# Patient Record
Sex: FEMALE | Race: WHITE | NOT HISPANIC OR LATINO | URBAN - METROPOLITAN AREA
[De-identification: names, ages, dates, MRNs, and addresses within clinical notes are randomized per-mention and may not be internally consistent; named-entity substitution may affect disease eponyms.]

---

## 2018-06-20 ENCOUNTER — ON CAMPUS - OUTPATIENT (OUTPATIENT)
Dept: URBAN - METROPOLITAN AREA HOSPITAL 85 | Facility: HOSPITAL | Age: 73
End: 2018-06-20
Payer: COMMERCIAL

## 2018-06-20 ENCOUNTER — HOSPITAL ENCOUNTER (OUTPATIENT)
Dept: PREOP | Facility: HOSPITAL | Age: 73
Setting detail: HOSPITAL OUTPATIENT SURGERY
Discharge: HOME | End: 2018-06-20
Attending: INTERNAL MEDICINE | Admitting: INTERNAL MEDICINE

## 2018-06-20 DIAGNOSIS — K64.8 OTHER HEMORRHOIDS: ICD-10-CM

## 2018-06-20 DIAGNOSIS — K22.2 ESOPHAGEAL OBSTRUCTION: ICD-10-CM

## 2018-06-20 DIAGNOSIS — R13.10 DYSPHAGIA, UNSPECIFIED: ICD-10-CM

## 2018-06-20 DIAGNOSIS — K21.9 GASTRO-ESOPHAGEAL REFLUX DISEASE WITHOUT ESOPHAGITIS: ICD-10-CM

## 2018-06-20 DIAGNOSIS — K57.30 DIVERTICULOSIS OF LARGE INTESTINE WITHOUT PERFORATION OR ABS: ICD-10-CM

## 2018-06-20 DIAGNOSIS — D50.9 IRON DEFICIENCY ANEMIA, UNSPECIFIED: ICD-10-CM

## 2018-06-20 LAB
GLUCOSE BLD-MCNC: 143 MG/DL (ref 70–105)
GLUCOSE BLD-MCNC: 165 MG/DL (ref 70–105)

## 2018-06-20 PROCEDURE — 45378 DIAGNOSTIC COLONOSCOPY: CPT | Performed by: INTERNAL MEDICINE

## 2018-06-20 PROCEDURE — 43235 EGD DIAGNOSTIC BRUSH WASH: CPT | Performed by: INTERNAL MEDICINE

## 2018-06-20 PROCEDURE — 43450 DILATE ESOPHAGUS 1/MULT PASS: CPT | Performed by: INTERNAL MEDICINE

## 2021-05-27 ENCOUNTER — APPOINTMENT (OUTPATIENT)
Dept: GENERAL RADIOLOGY | Facility: HOSPITAL | Age: 76
End: 2021-05-27

## 2021-05-27 ENCOUNTER — HOSPITAL ENCOUNTER (OUTPATIENT)
Facility: HOSPITAL | Age: 76
Setting detail: OBSERVATION
Discharge: HOME | End: 2021-05-28
Attending: EMERGENCY MEDICINE | Admitting: EMERGENCY MEDICINE

## 2021-05-27 DIAGNOSIS — R00.8 TRIGEMINY: ICD-10-CM
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
## 2021-05-27 DIAGNOSIS — N18.9 CHRONIC RENAL FAILURE, UNSPECIFIED CKD STAGE: ICD-10-CM

## 2021-05-27 DIAGNOSIS — S16.1XXA STRAIN OF NECK MUSCLE, INITIAL ENCOUNTER: ICD-10-CM

## 2021-05-27 DIAGNOSIS — R00.2 PALPITATIONS: ICD-10-CM

## 2021-05-27 LAB
ANION GAP SERPL CALCULATED.3IONS-SCNC: 14 MMOL/L (ref 5–15)
BUN SERPL-MCNC: 48 MG/DL (ref 8–23)
BUN/CREAT SERPL: 21.8 (ref 7–25)
CALCIUM SPEC-SCNC: 9.1 MG/DL (ref 8.6–10.5)
CHLORIDE SERPL-SCNC: 97 MMOL/L (ref 98–107)
CO2 SERPL-SCNC: 25 MMOL/L (ref 22–29)
CREAT SERPL-MCNC: 2.2 MG/DL (ref 0.57–1)
GFR SERPL CREATININE-BSD FRML MDRD: 22 ML/MIN/1.73
GLUCOSE SERPL-MCNC: 327 MG/DL (ref 65–99)
HOLD SPECIMEN: NORMAL
MAGNESIUM SERPL-MCNC: 1.8 MG/DL (ref 1.6–2.4)
POTASSIUM SERPL-SCNC: 4.1 MMOL/L (ref 3.5–5.2)
SODIUM SERPL-SCNC: 136 MMOL/L (ref 136–145)

## 2021-05-27 PROCEDURE — 80048 BASIC METABOLIC PNL TOTAL CA: CPT | Performed by: NURSE PRACTITIONER

## 2021-05-27 PROCEDURE — 93005 ELECTROCARDIOGRAM TRACING: CPT | Performed by: NURSE PRACTITIONER

## 2021-05-27 PROCEDURE — 83735 ASSAY OF MAGNESIUM: CPT | Performed by: NURSE PRACTITIONER

## 2021-05-27 PROCEDURE — 99283 EMERGENCY DEPT VISIT LOW MDM: CPT

## 2021-05-27 PROCEDURE — G0378 HOSPITAL OBSERVATION PER HR: HCPCS

## 2021-05-27 PROCEDURE — 72050 X-RAY EXAM NECK SPINE 4/5VWS: CPT

## 2021-05-27 PROCEDURE — 72072 X-RAY EXAM THORAC SPINE 3VWS: CPT

## 2021-05-27 RX ORDER — INSULIN LISPRO 100 [IU]/ML
10 INJECTION, SOLUTION INTRAVENOUS; SUBCUTANEOUS
COMMUNITY
End: 2021-05-27

## 2021-05-27 RX ORDER — FLUOXETINE HYDROCHLORIDE 20 MG/1
CAPSULE ORAL
COMMUNITY
End: 2021-05-28

## 2021-05-27 RX ORDER — MULTIVIT WITH MINERALS/LUTEIN
500 TABLET ORAL DAILY
COMMUNITY
End: 2022-07-13

## 2021-05-27 RX ORDER — MULTIVIT WITH MINERALS/LUTEIN
1000 TABLET ORAL DAILY
Status: ON HOLD | COMMUNITY
End: 2022-09-01

## 2021-05-27 RX ORDER — INSULIN DETEMIR 100 [IU]/ML
26-28 INJECTION, SOLUTION SUBCUTANEOUS NIGHTLY
COMMUNITY

## 2021-05-27 RX ORDER — GLIMEPIRIDE 4 MG/1
4 TABLET ORAL 2 TIMES DAILY
COMMUNITY
End: 2022-07-13

## 2021-05-27 RX ORDER — FLUOXETINE 10 MG/1
CAPSULE ORAL
COMMUNITY
End: 2021-05-28

## 2021-05-27 RX ORDER — TRAMADOL HYDROCHLORIDE 50 MG/1
TABLET ORAL
COMMUNITY
End: 2021-05-27

## 2021-05-27 RX ORDER — CIPROFLOXACIN 500 MG/1
TABLET, FILM COATED ORAL EVERY 12 HOURS
COMMUNITY
End: 2021-05-27

## 2021-05-27 RX ORDER — CARVEDILOL 6.25 MG/1
6.25 TABLET ORAL 2 TIMES DAILY WITH MEALS
COMMUNITY
End: 2021-06-21 | Stop reason: ALTCHOICE

## 2021-05-27 RX ORDER — OXYCODONE AND ACETAMINOPHEN 7.5; 325 MG/1; MG/1
TABLET ORAL
COMMUNITY
End: 2021-05-27

## 2021-05-27 RX ORDER — FLUOXETINE HYDROCHLORIDE 40 MG/1
CAPSULE ORAL
COMMUNITY
End: 2021-05-28

## 2021-05-27 RX ORDER — SITAGLIPTIN AND METFORMIN HYDROCHLORIDE 1000; 50 MG/1; MG/1
TABLET, FILM COATED ORAL EVERY 12 HOURS SCHEDULED
COMMUNITY
End: 2021-05-27

## 2021-05-27 RX ORDER — ATORVASTATIN CALCIUM 40 MG/1
TABLET, FILM COATED ORAL
COMMUNITY
End: 2021-05-27

## 2021-05-27 RX ORDER — OXYCODONE HYDROCHLORIDE 5 MG/1
5 TABLET ORAL ONCE
Status: COMPLETED | OUTPATIENT
Start: 2021-05-27 | End: 2021-05-27

## 2021-05-27 RX ORDER — SODIUM BICARBONATE 650 MG/1
650 TABLET ORAL 3 TIMES DAILY
COMMUNITY

## 2021-05-27 RX ORDER — DOXYCYCLINE HYCLATE 50 MG/1
CAPSULE, GELATIN COATED ORAL
COMMUNITY
End: 2021-05-27

## 2021-05-27 RX ORDER — INSULIN ASPART 100 [IU]/ML
10 INJECTION, SOLUTION INTRAVENOUS; SUBCUTANEOUS
Status: ON HOLD | COMMUNITY
End: 2022-09-02

## 2021-05-27 RX ORDER — ESOMEPRAZOLE MAGNESIUM 40 MG/1
CAPSULE, DELAYED RELEASE ORAL
COMMUNITY
End: 2021-05-27

## 2021-05-27 RX ORDER — CYCLOBENZAPRINE HCL 10 MG
TABLET ORAL EVERY 12 HOURS SCHEDULED
COMMUNITY
End: 2021-05-27

## 2021-05-27 RX ORDER — TRIAMTERENE AND HYDROCHLOROTHIAZIDE 37.5; 25 MG/1; MG/1
1 TABLET ORAL DAILY
COMMUNITY
End: 2022-07-13

## 2021-05-27 RX ORDER — CHLORAL HYDRATE 500 MG
1000 CAPSULE ORAL 2 TIMES DAILY WITH MEALS
COMMUNITY
Start: 2020-12-02

## 2021-05-27 RX ORDER — AMLODIPINE BESYLATE 10 MG/1
10 TABLET ORAL DAILY
COMMUNITY
End: 2022-09-09

## 2021-05-27 RX ORDER — LANCETS
1 EACH MISCELLANEOUS 3 TIMES DAILY
Status: ON HOLD | COMMUNITY
End: 2022-09-02

## 2021-05-27 RX ORDER — ASPIRIN 81 MG/1
81 TABLET ORAL EVERY OTHER DAY
COMMUNITY
End: 2022-07-14

## 2021-05-27 RX ORDER — CLOBETASOL PROPIONATE 0.5 MG/G
CREAM TOPICAL
COMMUNITY
End: 2021-05-27

## 2021-05-27 RX ORDER — CHLORHEXIDINE GLUCONATE 0.12 MG/ML
RINSE ORAL
COMMUNITY
End: 2021-05-27

## 2021-05-27 RX ORDER — PANTOPRAZOLE SODIUM 40 MG/1
40 TABLET, DELAYED RELEASE ORAL 2 TIMES DAILY
COMMUNITY

## 2021-05-27 RX ORDER — ATORVASTATIN CALCIUM 40 MG/1
40 TABLET, FILM COATED ORAL NIGHTLY
COMMUNITY
End: 2022-07-14

## 2021-05-27 RX ORDER — FLUOXETINE 20 MG/1
TABLET ORAL
COMMUNITY
End: 2021-05-28

## 2021-05-27 RX ORDER — HYDROCHLOROTHIAZIDE 12.5 MG/1
TABLET ORAL
COMMUNITY
End: 2021-05-27

## 2021-05-27 RX ADMIN — OXYCODONE 5 MG: 5 TABLET ORAL at 21:05

## 2021-05-28 ENCOUNTER — APPOINTMENT (OUTPATIENT)
Dept: CARDIOLOGY | Facility: HOSPITAL | Age: 76
End: 2021-05-28

## 2021-05-28 ENCOUNTER — APPOINTMENT (OUTPATIENT)
Dept: RESPIRATORY THERAPY | Facility: HOSPITAL | Age: 76
End: 2021-05-28

## 2021-05-28 VITALS
DIASTOLIC BLOOD PRESSURE: 75 MMHG | HEIGHT: 65 IN | SYSTOLIC BLOOD PRESSURE: 141 MMHG | WEIGHT: 202 LBS | OXYGEN SATURATION: 99 % | HEART RATE: 70 BPM | TEMPERATURE: 97.7 F | RESPIRATION RATE: 18 BRPM | BODY MASS INDEX: 33.66 KG/M2

## 2021-05-28 LAB
ANION GAP SERPL CALCULATED.3IONS-SCNC: 12 MMOL/L (ref 5–15)
BASOPHILS # BLD AUTO: 0 10*3/MM3 (ref 0–0.2)
BASOPHILS NFR BLD AUTO: 0.6 % (ref 0–1.5)
BH CV ECHO MEAS - ACS: 1.9 CM
BH CV ECHO MEAS - AO MAX PG (FULL): 3.9 MMHG
BH CV ECHO MEAS - AO MAX PG: 7.7 MMHG
BH CV ECHO MEAS - AO MEAN PG (FULL): 3.2 MMHG
BH CV ECHO MEAS - AO MEAN PG: 5.2 MMHG
BH CV ECHO MEAS - AO ROOT AREA (BSA CORRECTED): 1.3
BH CV ECHO MEAS - AO ROOT AREA: 4.9 CM^2
BH CV ECHO MEAS - AO ROOT DIAM: 2.5 CM
BH CV ECHO MEAS - AO V2 MAX: 139 CM/SEC
BH CV ECHO MEAS - AO V2 MEAN: 110.4 CM/SEC
BH CV ECHO MEAS - AO V2 VTI: 36.1 CM
BH CV ECHO MEAS - AORTIC HR: 64.6 BPM
BH CV ECHO MEAS - AORTIC R-R: 0.93 SEC
BH CV ECHO MEAS - ASC AORTA: 2.6 CM
BH CV ECHO MEAS - AVA(I,A): 2.1 CM^2
BH CV ECHO MEAS - AVA(I,D): 2.1 CM^2
BH CV ECHO MEAS - AVA(V,A): 2.3 CM^2
BH CV ECHO MEAS - AVA(V,D): 2.3 CM^2
BH CV ECHO MEAS - BSA(HAYCOCK): 2.1 M^2
BH CV ECHO MEAS - BSA: 2 M^2
BH CV ECHO MEAS - BZI_BMI: 33.6 KILOGRAMS/M^2
BH CV ECHO MEAS - BZI_METRIC_HEIGHT: 165.1 CM
BH CV ECHO MEAS - BZI_METRIC_WEIGHT: 91.6 KG
BH CV ECHO MEAS - CI(AO): 5.7 L/MIN/M^2
BH CV ECHO MEAS - CI(LVOT): 2.5 L/MIN/M^2
BH CV ECHO MEAS - CO(AO): 11.3 L/MIN
BH CV ECHO MEAS - CO(LVOT): 5 L/MIN
BH CV ECHO MEAS - EDV(CUBED): 68.1 ML
BH CV ECHO MEAS - EDV(MOD-SP2): 53.2 ML
BH CV ECHO MEAS - EDV(MOD-SP4): 81.5 ML
BH CV ECHO MEAS - EDV(TEICH): 73.5 ML
BH CV ECHO MEAS - EF(CUBED): 56.4 %
BH CV ECHO MEAS - EF(MOD-BP): 63 %
BH CV ECHO MEAS - EF(MOD-SP2): 66 %
BH CV ECHO MEAS - EF(MOD-SP4): 63.4 %
BH CV ECHO MEAS - EF(TEICH): 48.6 %
BH CV ECHO MEAS - ESV(CUBED): 29.7 ML
BH CV ECHO MEAS - ESV(MOD-SP2): 18.1 ML
BH CV ECHO MEAS - ESV(MOD-SP4): 29.9 ML
BH CV ECHO MEAS - ESV(TEICH): 37.8 ML
BH CV ECHO MEAS - FS: 24.2 %
BH CV ECHO MEAS - IVS/LVPW: 0.7
BH CV ECHO MEAS - IVSD: 1.2 CM
BH CV ECHO MEAS - LA DIMENSION(2D): 3.3 CM
BH CV ECHO MEAS - LV DIASTOLIC VOL/BSA (35-75): 41 ML/M^2
BH CV ECHO MEAS - LV MASS(C)D: 232.4 GRAMS
BH CV ECHO MEAS - LV MASS(C)DI: 117 GRAMS/M^2
BH CV ECHO MEAS - LV MAX PG: 3.8 MMHG
BH CV ECHO MEAS - LV MEAN PG: 2.1 MMHG
BH CV ECHO MEAS - LV SYSTOLIC VOL/BSA (12-30): 15 ML/M^2
BH CV ECHO MEAS - LV V1 MAX: 97.7 CM/SEC
BH CV ECHO MEAS - LV V1 MEAN: 67.4 CM/SEC
BH CV ECHO MEAS - LV V1 VTI: 23.9 CM
BH CV ECHO MEAS - LVIDD: 4.1 CM
BH CV ECHO MEAS - LVIDS: 3.1 CM
BH CV ECHO MEAS - LVOT AREA: 3.2 CM^2
BH CV ECHO MEAS - LVOT DIAM: 2 CM
BH CV ECHO MEAS - LVPWD: 1.7 CM
BH CV ECHO MEAS - MV A MAX VEL: 94.4 CM/SEC
BH CV ECHO MEAS - MV DEC SLOPE: 177.7 CM/SEC^2
BH CV ECHO MEAS - MV DEC TIME: 0.29 SEC
BH CV ECHO MEAS - MV E MAX VEL: 52.2 CM/SEC
BH CV ECHO MEAS - MV E/A: 0.55
BH CV ECHO MEAS - MV MAX PG: 3.7 MMHG
BH CV ECHO MEAS - MV MEAN PG: 1.4 MMHG
BH CV ECHO MEAS - MV V2 MAX: 96.4 CM/SEC
BH CV ECHO MEAS - MV V2 MEAN: 53.6 CM/SEC
BH CV ECHO MEAS - MV V2 VTI: 25.9 CM
BH CV ECHO MEAS - MVA(VTI): 3 CM^2
BH CV ECHO MEAS - PA ACC TIME: 0.1 SEC
BH CV ECHO MEAS - PA MAX PG (FULL): 1.7 MMHG
BH CV ECHO MEAS - PA MAX PG: 4.9 MMHG
BH CV ECHO MEAS - PA MEAN PG (FULL): 1.2 MMHG
BH CV ECHO MEAS - PA MEAN PG: 3.2 MMHG
BH CV ECHO MEAS - PA PR(ACCEL): 32.8 MMHG
BH CV ECHO MEAS - PA V2 MAX: 110.9 CM/SEC
BH CV ECHO MEAS - PA V2 MEAN: 84.5 CM/SEC
BH CV ECHO MEAS - PA V2 VTI: 25.1 CM
BH CV ECHO MEAS - PULM A REVS DUR: 0.25 SEC
BH CV ECHO MEAS - PULM A REVS VEL: 37.5 CM/SEC
BH CV ECHO MEAS - PULM DIAS VEL: 44.2 CM/SEC
BH CV ECHO MEAS - PULM S/D: 1.3
BH CV ECHO MEAS - PULM SYS VEL: 56 CM/SEC
BH CV ECHO MEAS - PVA(I,A): 3.8 CM^2
BH CV ECHO MEAS - PVA(I,D): 3.8 CM^2
BH CV ECHO MEAS - PVA(V,A): 3.8 CM^2
BH CV ECHO MEAS - PVA(V,D): 3.8 CM^2
BH CV ECHO MEAS - QP/QS: 1.2
BH CV ECHO MEAS - RAP SYSTOLE: 3 MMHG
BH CV ECHO MEAS - RV MAX PG: 3.3 MMHG
BH CV ECHO MEAS - RV MEAN PG: 2 MMHG
BH CV ECHO MEAS - RV V1 MAX: 90.3 CM/SEC
BH CV ECHO MEAS - RV V1 MEAN: 66.6 CM/SEC
BH CV ECHO MEAS - RV V1 VTI: 20.3 CM
BH CV ECHO MEAS - RVDD: 2.4 CM
BH CV ECHO MEAS - RVOT AREA: 4.6 CM^2
BH CV ECHO MEAS - RVOT DIAM: 2.4 CM
BH CV ECHO MEAS - RVSP: 14.8 MMHG
BH CV ECHO MEAS - SI(AO): 88.3 ML/M^2
BH CV ECHO MEAS - SI(CUBED): 19.4 ML/M^2
BH CV ECHO MEAS - SI(LVOT): 39 ML/M^2
BH CV ECHO MEAS - SI(MOD-SP2): 17.7 ML/M^2
BH CV ECHO MEAS - SI(MOD-SP4): 26 ML/M^2
BH CV ECHO MEAS - SI(TEICH): 18 ML/M^2
BH CV ECHO MEAS - SV(AO): 175.3 ML
BH CV ECHO MEAS - SV(CUBED): 38.4 ML
BH CV ECHO MEAS - SV(LVOT): 77.4 ML
BH CV ECHO MEAS - SV(MOD-SP2): 35.1 ML
BH CV ECHO MEAS - SV(MOD-SP4): 51.7 ML
BH CV ECHO MEAS - SV(RVOT): 94.1 ML
BH CV ECHO MEAS - SV(TEICH): 35.7 ML
BH CV ECHO MEAS - TR MAX VEL: 171.9 CM/SEC
BUN SERPL-MCNC: 44 MG/DL (ref 8–23)
BUN/CREAT SERPL: 24.3 (ref 7–25)
CALCIUM SPEC-SCNC: 9.2 MG/DL (ref 8.6–10.5)
CHLORIDE SERPL-SCNC: 101 MMOL/L (ref 98–107)
CO2 SERPL-SCNC: 25 MMOL/L (ref 22–29)
CREAT SERPL-MCNC: 1.81 MG/DL (ref 0.57–1)
DEPRECATED RDW RBC AUTO: 40.7 FL (ref 37–54)
EOSINOPHIL # BLD AUTO: 0.2 10*3/MM3 (ref 0–0.4)
EOSINOPHIL NFR BLD AUTO: 2.4 % (ref 0.3–6.2)
ERYTHROCYTE [DISTWIDTH] IN BLOOD BY AUTOMATED COUNT: 12.8 % (ref 12.3–15.4)
GFR SERPL CREATININE-BSD FRML MDRD: 27 ML/MIN/1.73
GLUCOSE BLDC GLUCOMTR-MCNC: 122 MG/DL (ref 70–105)
GLUCOSE BLDC GLUCOMTR-MCNC: 129 MG/DL (ref 70–105)
GLUCOSE BLDC GLUCOMTR-MCNC: 174 MG/DL (ref 70–105)
GLUCOSE BLDC GLUCOMTR-MCNC: 194 MG/DL (ref 70–105)
GLUCOSE BLDC GLUCOMTR-MCNC: 201 MG/DL (ref 70–105)
GLUCOSE BLDC GLUCOMTR-MCNC: 83 MG/DL (ref 70–105)
GLUCOSE SERPL-MCNC: 186 MG/DL (ref 65–99)
HCT VFR BLD AUTO: 35.4 % (ref 34–46.6)
HGB BLD-MCNC: 12 G/DL (ref 12–15.9)
LYMPHOCYTES # BLD AUTO: 1.9 10*3/MM3 (ref 0.7–3.1)
LYMPHOCYTES NFR BLD AUTO: 27.3 % (ref 19.6–45.3)
MCH RBC QN AUTO: 30.8 PG (ref 26.6–33)
MCHC RBC AUTO-ENTMCNC: 33.8 G/DL (ref 31.5–35.7)
MCV RBC AUTO: 91 FL (ref 79–97)
MONOCYTES # BLD AUTO: 0.8 10*3/MM3 (ref 0.1–0.9)
MONOCYTES NFR BLD AUTO: 10.8 % (ref 5–12)
NEUTROPHILS NFR BLD AUTO: 4.2 10*3/MM3 (ref 1.7–7)
NEUTROPHILS NFR BLD AUTO: 58.9 % (ref 42.7–76)
NRBC BLD AUTO-RTO: 0.2 /100 WBC (ref 0–0.2)
PLATELET # BLD AUTO: 248 10*3/MM3 (ref 140–450)
PMV BLD AUTO: 8.6 FL (ref 6–12)
POTASSIUM SERPL-SCNC: 4 MMOL/L (ref 3.5–5.2)
RBC # BLD AUTO: 3.89 10*6/MM3 (ref 3.77–5.28)
SARS-COV-2 RNA PNL SPEC NAA+PROBE: NOT DETECTED
SODIUM SERPL-SCNC: 138 MMOL/L (ref 136–145)
WBC # BLD AUTO: 7.1 10*3/MM3 (ref 3.4–10.8)

## 2021-05-28 PROCEDURE — 85025 COMPLETE CBC W/AUTO DIFF WBC: CPT | Performed by: NURSE PRACTITIONER

## 2021-05-28 PROCEDURE — U0003 INFECTIOUS AGENT DETECTION BY NUCLEIC ACID (DNA OR RNA); SEVERE ACUTE RESPIRATORY SYNDROME CORONAVIRUS 2 (SARS-COV-2) (CORONAVIRUS DISEASE [COVID-19]), AMPLIFIED PROBE TECHNIQUE, MAKING USE OF HIGH THROUGHPUT TECHNOLOGIES AS DESCRIBED BY CMS-2020-01-R: HCPCS | Performed by: EMERGENCY MEDICINE

## 2021-05-28 PROCEDURE — G0378 HOSPITAL OBSERVATION PER HR: HCPCS

## 2021-05-28 PROCEDURE — 63710000001 INSULIN LISPRO (HUMAN) PER 5 UNITS: Performed by: NURSE PRACTITIONER

## 2021-05-28 PROCEDURE — 93306 TTE W/DOPPLER COMPLETE: CPT | Performed by: INTERNAL MEDICINE

## 2021-05-28 PROCEDURE — 93306 TTE W/DOPPLER COMPLETE: CPT

## 2021-05-28 PROCEDURE — 93242 EXT ECG>48HR<7D RECORDING: CPT | Performed by: INTERNAL MEDICINE

## 2021-05-28 PROCEDURE — C9803 HOPD COVID-19 SPEC COLLECT: HCPCS | Performed by: EMERGENCY MEDICINE

## 2021-05-28 PROCEDURE — 63710000001 INSULIN LISPRO (HUMAN) PER 5 UNITS: Performed by: PHYSICIAN ASSISTANT

## 2021-05-28 PROCEDURE — 63710000001 INSULIN GLARGINE PER 5 UNITS: Performed by: NURSE PRACTITIONER

## 2021-05-28 PROCEDURE — 99244 OFF/OP CNSLTJ NEW/EST MOD 40: CPT | Performed by: INTERNAL MEDICINE

## 2021-05-28 PROCEDURE — 82962 GLUCOSE BLOOD TEST: CPT

## 2021-05-28 PROCEDURE — 93246 EXT ECG>7D<15D RECORDING: CPT

## 2021-05-28 PROCEDURE — 80048 BASIC METABOLIC PNL TOTAL CA: CPT | Performed by: NURSE PRACTITIONER

## 2021-05-28 RX ORDER — ATORVASTATIN CALCIUM 20 MG/1
20 TABLET, FILM COATED ORAL NIGHTLY
Status: DISCONTINUED | OUTPATIENT
Start: 2021-05-28 | End: 2021-05-28 | Stop reason: HOSPADM

## 2021-05-28 RX ORDER — SODIUM CHLORIDE 0.9 % (FLUSH) 0.9 %
10 SYRINGE (ML) INJECTION EVERY 12 HOURS SCHEDULED
Status: DISCONTINUED | OUTPATIENT
Start: 2021-05-28 | End: 2021-05-28

## 2021-05-28 RX ORDER — DEXTROSE MONOHYDRATE 25 G/50ML
25 INJECTION, SOLUTION INTRAVENOUS
Status: DISCONTINUED | OUTPATIENT
Start: 2021-05-28 | End: 2021-05-28 | Stop reason: HOSPADM

## 2021-05-28 RX ORDER — MELATONIN
2000 DAILY
Status: DISCONTINUED | OUTPATIENT
Start: 2021-05-28 | End: 2021-05-28 | Stop reason: HOSPADM

## 2021-05-28 RX ORDER — PANTOPRAZOLE SODIUM 40 MG/1
40 TABLET, DELAYED RELEASE ORAL
Status: DISCONTINUED | OUTPATIENT
Start: 2021-05-28 | End: 2021-05-28 | Stop reason: HOSPADM

## 2021-05-28 RX ORDER — SODIUM CHLORIDE 0.9 % (FLUSH) 0.9 %
10 SYRINGE (ML) INJECTION AS NEEDED
Status: DISCONTINUED | OUTPATIENT
Start: 2021-05-28 | End: 2021-05-28

## 2021-05-28 RX ORDER — INSULIN LISPRO 100 [IU]/ML
0-14 INJECTION, SOLUTION INTRAVENOUS; SUBCUTANEOUS
Status: DISCONTINUED | OUTPATIENT
Start: 2021-05-28 | End: 2021-05-28 | Stop reason: HOSPADM

## 2021-05-28 RX ORDER — NICOTINE POLACRILEX 4 MG
15 LOZENGE BUCCAL
Status: DISCONTINUED | OUTPATIENT
Start: 2021-05-28 | End: 2021-05-28 | Stop reason: HOSPADM

## 2021-05-28 RX ORDER — ONDANSETRON 2 MG/ML
4 INJECTION INTRAMUSCULAR; INTRAVENOUS EVERY 6 HOURS PRN
Status: DISCONTINUED | OUTPATIENT
Start: 2021-05-28 | End: 2021-05-28 | Stop reason: HOSPADM

## 2021-05-28 RX ORDER — INSULIN LISPRO 100 [IU]/ML
0-14 INJECTION, SOLUTION INTRAVENOUS; SUBCUTANEOUS AS NEEDED
Status: DISCONTINUED | OUTPATIENT
Start: 2021-05-28 | End: 2021-05-28 | Stop reason: HOSPADM

## 2021-05-28 RX ORDER — GLIPIZIDE 5 MG/1
5 TABLET ORAL
Status: DISCONTINUED | OUTPATIENT
Start: 2021-05-28 | End: 2021-05-28 | Stop reason: HOSPADM

## 2021-05-28 RX ORDER — ACETAMINOPHEN 325 MG/1
650 TABLET ORAL EVERY 4 HOURS PRN
Status: DISCONTINUED | OUTPATIENT
Start: 2021-05-28 | End: 2021-05-28 | Stop reason: HOSPADM

## 2021-05-28 RX ORDER — GLIPIZIDE 5 MG/1
5 TABLET ORAL
Status: DISCONTINUED | OUTPATIENT
Start: 2021-05-28 | End: 2021-05-28

## 2021-05-28 RX ORDER — DEXTROSE MONOHYDRATE 25 G/50ML
25 INJECTION, SOLUTION INTRAVENOUS
Status: DISCONTINUED | OUTPATIENT
Start: 2021-05-28 | End: 2021-05-28 | Stop reason: SDUPTHER

## 2021-05-28 RX ORDER — MELATONIN
2000 2 TIMES DAILY
COMMUNITY

## 2021-05-28 RX ORDER — INSULIN LISPRO 100 [IU]/ML
10 INJECTION, SOLUTION INTRAVENOUS; SUBCUTANEOUS
Status: DISCONTINUED | OUTPATIENT
Start: 2021-05-28 | End: 2021-05-28 | Stop reason: HOSPADM

## 2021-05-28 RX ORDER — SODIUM BICARBONATE 650 MG/1
650 TABLET ORAL 3 TIMES DAILY
Status: DISCONTINUED | OUTPATIENT
Start: 2021-05-28 | End: 2021-05-28 | Stop reason: HOSPADM

## 2021-05-28 RX ORDER — NICOTINE POLACRILEX 4 MG
15 LOZENGE BUCCAL
Status: DISCONTINUED | OUTPATIENT
Start: 2021-05-28 | End: 2021-05-28 | Stop reason: SDUPTHER

## 2021-05-28 RX ORDER — TRIAMTERENE AND HYDROCHLOROTHIAZIDE 37.5; 25 MG/1; MG/1
1 TABLET ORAL DAILY
Status: DISCONTINUED | OUTPATIENT
Start: 2021-05-28 | End: 2021-05-28 | Stop reason: HOSPADM

## 2021-05-28 RX ORDER — CHOLECALCIFEROL (VITAMIN D3) 125 MCG
5 CAPSULE ORAL NIGHTLY PRN
Status: DISCONTINUED | OUTPATIENT
Start: 2021-05-28 | End: 2021-05-28 | Stop reason: HOSPADM

## 2021-05-28 RX ORDER — AMLODIPINE BESYLATE 5 MG/1
10 TABLET ORAL DAILY
Status: DISCONTINUED | OUTPATIENT
Start: 2021-05-28 | End: 2021-05-28 | Stop reason: HOSPADM

## 2021-05-28 RX ORDER — CARVEDILOL 6.25 MG/1
6.25 TABLET ORAL 2 TIMES DAILY WITH MEALS
Status: DISCONTINUED | OUTPATIENT
Start: 2021-05-28 | End: 2021-05-28 | Stop reason: HOSPADM

## 2021-05-28 RX ORDER — ASPIRIN 81 MG/1
81 TABLET ORAL EVERY OTHER DAY
Status: DISCONTINUED | OUTPATIENT
Start: 2021-05-28 | End: 2021-05-28 | Stop reason: HOSPADM

## 2021-05-28 RX ORDER — INSULIN GLARGINE 100 [IU]/ML
18 INJECTION, SOLUTION SUBCUTANEOUS NIGHTLY
Status: DISCONTINUED | OUTPATIENT
Start: 2021-05-28 | End: 2021-05-28 | Stop reason: HOSPADM

## 2021-05-28 RX ORDER — FERROUS SULFATE TAB EC 324 MG (65 MG FE EQUIVALENT) 324 (65 FE) MG
324 TABLET DELAYED RESPONSE ORAL
Status: DISCONTINUED | OUTPATIENT
Start: 2021-05-28 | End: 2021-05-28 | Stop reason: HOSPADM

## 2021-05-28 RX ORDER — FERROUS SULFATE TAB EC 324 MG (65 MG FE EQUIVALENT) 324 (65 FE) MG
324 TABLET DELAYED RESPONSE ORAL
COMMUNITY

## 2021-05-28 RX ADMIN — ATORVASTATIN CALCIUM 20 MG: 20 TABLET, FILM COATED ORAL at 01:00

## 2021-05-28 RX ADMIN — PANTOPRAZOLE SODIUM 40 MG: 40 TABLET, DELAYED RELEASE ORAL at 06:01

## 2021-05-28 RX ADMIN — FERROUS SULFATE TAB EC 324 MG (65 MG FE EQUIVALENT) 324 MG: 324 (65 FE) TABLET DELAYED RESPONSE at 08:09

## 2021-05-28 RX ADMIN — TRIAMTERENE AND HYDROCHLOROTHIAZIDE 1 TABLET: 37.5; 25 TABLET ORAL at 10:47

## 2021-05-28 RX ADMIN — CARVEDILOL 6.25 MG: 6.25 TABLET, FILM COATED ORAL at 17:15

## 2021-05-28 RX ADMIN — ASPIRIN 81 MG: 81 TABLET, COATED ORAL at 08:09

## 2021-05-28 RX ADMIN — INSULIN LISPRO 10 UNITS: 100 INJECTION, SOLUTION INTRAVENOUS; SUBCUTANEOUS at 08:09

## 2021-05-28 RX ADMIN — INSULIN LISPRO 10 UNITS: 100 INJECTION, SOLUTION INTRAVENOUS; SUBCUTANEOUS at 17:16

## 2021-05-28 RX ADMIN — SODIUM BICARBONATE 650 MG TABLET 650 MG: at 17:15

## 2021-05-28 RX ADMIN — INSULIN LISPRO 3 UNITS: 100 INJECTION, SOLUTION INTRAVENOUS; SUBCUTANEOUS at 08:10

## 2021-05-28 RX ADMIN — AMLODIPINE BESYLATE 10 MG: 5 TABLET ORAL at 08:09

## 2021-05-28 RX ADMIN — CARVEDILOL 6.25 MG: 6.25 TABLET, FILM COATED ORAL at 08:37

## 2021-05-28 RX ADMIN — Medication 2000 UNITS: at 08:09

## 2021-05-28 RX ADMIN — LINAGLIPTIN 5 MG: 5 TABLET, FILM COATED ORAL at 08:09

## 2021-05-28 RX ADMIN — INSULIN GLARGINE 18 UNITS: 100 INJECTION, SOLUTION SUBCUTANEOUS at 01:00

## 2021-05-28 RX ADMIN — SODIUM BICARBONATE 650 MG TABLET 650 MG: at 08:09

## 2021-05-28 RX ADMIN — GLIPIZIDE 5 MG: 5 TABLET ORAL at 01:00

## 2021-05-30 LAB — QT INTERVAL: 410 MS

## 2021-06-01 NOTE — CASE MANAGEMENT/SOCIAL WORK
Case Management Discharge Note                Selected Continued Care - Discharged on 5/28/2021 Admission date: 5/27/2021 - Discharge disposition: Home or Self Care     Final Discharge Disposition Code: 01 - home or self-care

## 2021-06-16 ENCOUNTER — OFFICE VISIT (OUTPATIENT)
Dept: CARDIOLOGY | Facility: CLINIC | Age: 76
End: 2021-06-16

## 2021-06-16 VITALS
OXYGEN SATURATION: 99 % | HEIGHT: 65 IN | HEART RATE: 62 BPM | DIASTOLIC BLOOD PRESSURE: 73 MMHG | BODY MASS INDEX: 32.99 KG/M2 | WEIGHT: 198 LBS | SYSTOLIC BLOOD PRESSURE: 166 MMHG

## 2021-06-16 DIAGNOSIS — I10 ESSENTIAL HYPERTENSION: ICD-10-CM

## 2021-06-16 DIAGNOSIS — E78.00 PURE HYPERCHOLESTEROLEMIA: ICD-10-CM

## 2021-06-16 DIAGNOSIS — E11.9 TYPE 2 DIABETES MELLITUS WITHOUT COMPLICATION, WITHOUT LONG-TERM CURRENT USE OF INSULIN (HCC): ICD-10-CM

## 2021-06-16 DIAGNOSIS — R00.2 PALPITATIONS: Primary | ICD-10-CM

## 2021-06-16 PROCEDURE — 99213 OFFICE O/P EST LOW 20 MIN: CPT | Performed by: INTERNAL MEDICINE
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
## 2021-06-18 PROCEDURE — 93244 EXT ECG>48HR<7D REV&INTERPJ: CPT | Performed by: INTERNAL MEDICINE

## 2021-06-20 NOTE — TELEPHONE ENCOUNTER
Called pt. crease the dose of carvedilol to 12.5 mg twice daily.  If she tolerates, please call the prescription to her pharmacy.

## 2021-06-21 RX ORDER — CARVEDILOL 12.5 MG/1
12.5 TABLET ORAL 2 TIMES DAILY WITH MEALS
COMMUNITY
End: 2021-06-21 | Stop reason: SDUPTHER

## 2021-06-21 RX ORDER — CARVEDILOL 12.5 MG/1
12.5 TABLET ORAL 2 TIMES DAILY WITH MEALS
Qty: 180 TABLET | Refills: 2 | Status: SHIPPED | OUTPATIENT
Start: 2021-06-21 | End: 2021-12-22

## 2021-07-14 ENCOUNTER — OFFICE VISIT (OUTPATIENT)
Dept: CARDIOLOGY | Facility: CLINIC | Age: 76
End: 2021-07-14

## 2021-07-14 VITALS
HEIGHT: 65 IN | OXYGEN SATURATION: 98 % | DIASTOLIC BLOOD PRESSURE: 77 MMHG | BODY MASS INDEX: 33.15 KG/M2 | WEIGHT: 199 LBS | HEART RATE: 61 BPM | SYSTOLIC BLOOD PRESSURE: 153 MMHG

## 2021-07-14 DIAGNOSIS — I10 ESSENTIAL HYPERTENSION: ICD-10-CM

## 2021-07-14 DIAGNOSIS — R00.2 PALPITATIONS: Primary | ICD-10-CM

## 2021-07-14 DIAGNOSIS — E11.9 TYPE 2 DIABETES MELLITUS WITHOUT COMPLICATION, WITHOUT LONG-TERM CURRENT USE OF INSULIN (HCC): ICD-10-CM

## 2021-07-14 DIAGNOSIS — E78.00 PURE HYPERCHOLESTEROLEMIA: ICD-10-CM

## 2021-07-14 PROCEDURE — 99214 OFFICE O/P EST MOD 30 MIN: CPT | Performed by: INTERNAL MEDICINE

## 2021-07-14 NOTE — PROGRESS NOTES
"    Subjective:     Encounter Date:2021      Patient ID: Neeta Harrell is a 75 y.o. female.    Chief Complaint:  History of Present Illness 75-year-old white female with history of diabetes hypertension hyperlipidemia chronic insulin supposed to my office for follow-up.  Patient is currently stable without any signs of chest pain or shortness of breath at rest on exertion.  No complains of any PND orthopnea.  No palpitation dizziness syncope or swelling of the feet.  She is taking her medicines regularly.    The following portions of the patient's history were reviewed and updated as appropriate: allergies, current medications, past family history, past medical history, past social history, past surgical history and problem list.  Past Medical History:   Diagnosis Date   • Arthritis    • CKD (chronic kidney disease)    • Diabetes mellitus (CMS/HCC)    • Hyperlipidemia    • Hypertension      Past Surgical History:   Procedure Laterality Date   •  SECTION      x2   • CHOLECYSTECTOMY     • HYSTERECTOMY     • INCONTINENCE SURGERY     • TONSILLECTOMY       /77 (BP Location: Left arm, Patient Position: Sitting)   Pulse 61   Ht 165.1 cm (65\")   Wt 90.3 kg (199 lb)   SpO2 98%   BMI 33.12 kg/m²   Family History   Problem Relation Age of Onset   • Arrhythmia Sister         has pacemaker   • Heart disease Brother         bypass sx       Current Outpatient Medications:   •  Accu-Chek Softclix Lancets lancets, 1 each 3 (Three) Times a Day. Test BG TID, Disp: , Rfl:   •  amLODIPine (NORVASC) 10 MG tablet, Take 10 mg by mouth Daily., Disp: , Rfl:   •  aspirin (aspirin) 81 MG EC tablet, 81 mg Every Other Day., Disp: , Rfl:   •  atorvastatin (Lipitor) 40 MG tablet, Take 40 mg by mouth Every Night., Disp: , Rfl:   •  carvedilol (COREG) 12.5 MG tablet, Take 1 tablet by mouth 2 (Two) Times a Day With Meals., Disp: 180 tablet, Rfl: 2  •  cholecalciferol (VITAMIN D3) 25 MCG (1000 UT) tablet, Take 2,000 " Units by mouth Daily., Disp: , Rfl:   •  ferrous sulfate 324 (65 Fe) MG tablet delayed-release EC tablet, Take 324 mg by mouth Daily With Breakfast., Disp: , Rfl:   •  glimepiride (AMARYL) 4 MG tablet, Take 4 mg by mouth 2 (two) times a day., Disp: , Rfl:   •  glucagon (GLUCAGEN) 1 MG injection, Inject 1 mg into the appropriate muscle as directed by prescriber 1 (One) Time As Needed., Disp: , Rfl:   •  glucose blood test strip, 1 each by Other route 3 (Three) Times a Day As Needed., Disp: , Rfl:   •  insulin aspart (NovoLOG FlexPen) 100 UNIT/ML solution pen-injector sc pen, Inject 10 Units under the skin into the appropriate area as directed 3 (Three) Times a Day With Meals., Disp: , Rfl:   •  insulin detemir (Levemir FlexTouch) 100 UNIT/ML injection, Inject 18 Units under the skin into the appropriate area as directed Every Night., Disp: , Rfl:   •  Omega-3 Fatty Acids (fish oil) 1000 MG capsule capsule, Take 1,000 mg by mouth 2 (Two) Times a Day With Meals., Disp: , Rfl:   •  pantoprazole (PROTONIX) 40 MG EC tablet, Take 40 mg by mouth 2 (two) times a day., Disp: , Rfl:   •  SITagliptin (Januvia) 25 MG tablet, Take 25 mg by mouth Daily., Disp: , Rfl:   •  sodium bicarbonate 650 MG tablet, Take 650 mg by mouth 3 (Three) Times a Day., Disp: , Rfl:   •  triamterene-hydrochlorothiazide (MAXZIDE-25) 37.5-25 MG per tablet, Take 1 tablet by mouth Daily., Disp: , Rfl:   •  vitamin C (ASCORBIC ACID) 250 MG tablet, Take 500 mg by mouth Daily., Disp: , Rfl:   •  vitamin E 1000 UNIT capsule, Take 1,000 Units by mouth Daily., Disp: , Rfl:   •  Zinc 50 MG capsule, Take  by mouth., Disp: , Rfl:   Allergies   Allergen Reactions   • Ibuprofen Other (See Comments) and Nausea And Vomiting     Kidney and liver problems     Social History     Socioeconomic History   • Marital status:      Spouse name: Not on file   • Number of children: Not on file   • Years of education: Not on file   • Highest education level: Not on file    Tobacco Use   • Smoking status: Never Smoker   • Smokeless tobacco: Never Used   Vaping Use   • Vaping Use: Never used   Substance and Sexual Activity   • Alcohol use: Never   • Drug use: Never   • Sexual activity: Defer     Review of Systems   Constitutional: Negative for fever and malaise/fatigue.   Cardiovascular: Negative for chest pain, dyspnea on exertion, leg swelling and palpitations.   Respiratory: Negative for cough and shortness of breath.    Skin: Negative for rash.   Gastrointestinal: Negative for abdominal pain, nausea and vomiting.   Neurological: Negative for focal weakness, headaches, light-headedness and numbness.   All other systems reviewed and are negative.             Objective:     Constitutional:       Appearance: Well-developed.   Eyes:      General: No scleral icterus.     Conjunctiva/sclera: Conjunctivae normal.   HENT:      Head: Normocephalic and atraumatic.   Neck:      Vascular: No carotid bruit or JVD.   Pulmonary:      Effort: Pulmonary effort is normal.      Breath sounds: Normal breath sounds. No wheezing. No rales.   Cardiovascular:      Normal rate. Regular rhythm.   Pulses:     Intact distal pulses.   Abdominal:      General: Bowel sounds are normal.      Palpations: Abdomen is soft.   Musculoskeletal:      Cervical back: Normal range of motion and neck supple. Skin:     General: Skin is warm and dry.      Findings: No rash.   Neurological:      Mental Status: Alert.       Procedures    Lab Review:         MDM  1.  Palpitations  Patient has frequent PACs and occasional atrial arrhythmias but is currently stable on beta-blockers  Patient has normal LV systolic function by echocardiogram  2.  Diabetes  Patient is currently on insulin and other oral medications and followed by the primary care doctor next #3 hypertension  Patient blood pressure currently stable on carvedilol and diuretics  4.  Hyperlipidemia  Patient currently on a statin.

## 2021-12-22 RX ORDER — CARVEDILOL 12.5 MG/1
TABLET ORAL
Qty: 180 TABLET | Refills: 1 | Status: SHIPPED | OUTPATIENT
Start: 2021-12-22 | End: 2022-09-04 | Stop reason: HOSPADM

## 2021-12-22 NOTE — TELEPHONE ENCOUNTER
Rx Refill Note  Requested Prescriptions     Pending Prescriptions Disp Refills   • carvedilol (COREG) 12.5 MG tablet [Pharmacy Med Name: CARVEDILOL 12.5MG TABLETS] 180 tablet 2     Sig: TAKE 1 TABLET BY MOUTH TWICE DAILY WITH MEALS      Last office visit with prescribing clinician: 7/14/2021      Next office visit with prescribing clinician: 7/14/2022            Vale Stanley MA  12/22/21, 09:23 EST

## 2022-07-14 ENCOUNTER — OFFICE VISIT (OUTPATIENT)
Dept: CARDIOLOGY | Facility: CLINIC | Age: 77
End: 2022-07-14

## 2022-07-14 VITALS
BODY MASS INDEX: 32.65 KG/M2 | HEART RATE: 54 BPM | OXYGEN SATURATION: 98 % | DIASTOLIC BLOOD PRESSURE: 53 MMHG | HEIGHT: 65 IN | WEIGHT: 196 LBS | SYSTOLIC BLOOD PRESSURE: 152 MMHG

## 2022-07-14 DIAGNOSIS — R00.2 PALPITATIONS: Primary | ICD-10-CM

## 2022-07-14 DIAGNOSIS — E11.9 TYPE 2 DIABETES MELLITUS WITHOUT COMPLICATION, WITHOUT LONG-TERM CURRENT USE OF INSULIN: ICD-10-CM

## 2022-07-14 DIAGNOSIS — E78.00 PURE HYPERCHOLESTEROLEMIA: ICD-10-CM

## 2022-07-14 DIAGNOSIS — I10 ESSENTIAL HYPERTENSION: ICD-10-CM

## 2022-07-14 PROCEDURE — 99214 OFFICE O/P EST MOD 30 MIN: CPT | Performed by: INTERNAL MEDICINE

## 2022-07-14 RX ORDER — ATORVASTATIN CALCIUM 40 MG/1
1 TABLET, FILM COATED ORAL DAILY
COMMUNITY

## 2022-07-14 RX ORDER — AMLODIPINE BESYLATE 10 MG/1
1 TABLET ORAL DAILY
COMMUNITY
End: 2022-07-14 | Stop reason: SDUPTHER

## 2022-07-14 RX ORDER — ASPIRIN 81 MG/1
1 TABLET ORAL EVERY OTHER DAY
COMMUNITY

## 2022-07-14 RX ORDER — SODIUM BICARBONATE 650 MG/1
1 TABLET ORAL 3 TIMES DAILY
Status: ON HOLD | COMMUNITY
End: 2022-09-02 | Stop reason: SDUPTHER

## 2022-07-14 RX ORDER — ALENDRONATE SODIUM 70 MG/1
1 TABLET ORAL
COMMUNITY

## 2022-07-14 RX ORDER — LOSARTAN POTASSIUM 100 MG/1
1 TABLET ORAL DAILY
COMMUNITY
Start: 2021-11-18

## 2022-07-14 NOTE — PROGRESS NOTES
"    Subjective:     Encounter Date:2022      Patient ID: Neeta Harrell is a 76 y.o. female.    Chief Complaint:  History of Present Illness 76-year-old white female with history of palpitations hypertension diabetes hyperlipidemia presents to my office for follow-up.  Patient is currently stable without any symptoms of chest pain or shortness of breath at rest or exertion.  No complains any PND orthopnea.  No palpitation dizziness syncope or swelling of the feet.  Patient has been taking all medicines regularly.  Patient does not smoke.  She is trying  Regularly she follows a good diet    The following portions of the patient's history were reviewed and updated as appropriate: allergies, current medications, past family history, past medical history, past social history, past surgical history and problem list.  Past Medical History:   Diagnosis Date   • Arthritis    • CKD (chronic kidney disease)    • Diabetes mellitus (HCC)    • Hyperlipidemia    • Hypertension      Past Surgical History:   Procedure Laterality Date   •  SECTION      x2   • CHOLECYSTECTOMY     • HYSTERECTOMY     • INCONTINENCE SURGERY     • TONSILLECTOMY       /53 (BP Location: Right arm, Patient Position: Sitting, Cuff Size: Adult)   Pulse 54   Ht 165.1 cm (65\")   Wt 88.9 kg (196 lb)   SpO2 98%   BMI 32.62 kg/m²   Family History   Problem Relation Age of Onset   • Arrhythmia Sister         has pacemaker   • Heart disease Brother         bypass sx       Current Outpatient Medications:   •  Accu-Chek Softclix Lancets lancets, 1 each 3 (Three) Times a Day. Test BG TID, Disp: , Rfl:   •  alendronate (FOSAMAX) 70 MG tablet, Take 1 tablet by mouth Daily., Disp: , Rfl:   •  amLODIPine (NORVASC) 10 MG tablet, Take 10 mg by mouth Daily., Disp: , Rfl:   •  amLODIPine (NORVASC) 10 MG tablet, Take 1 tablet by mouth Daily., Disp: , Rfl:   •  aspirin 81 MG EC tablet, Take 1 tablet by mouth Every Other Day., Disp: , Rfl:   •  " atorvastatin (LIPITOR) 40 MG tablet, Take 1 tablet by mouth Daily., Disp: , Rfl:   •  carvedilol (COREG) 12.5 MG tablet, TAKE 1 TABLET BY MOUTH TWICE DAILY WITH MEALS, Disp: 180 tablet, Rfl: 1  •  cholecalciferol (VITAMIN D3) 25 MCG (1000 UT) tablet, Take 2,000 Units by mouth Daily., Disp: , Rfl:   •  Cholecalciferol 50 MCG (2000 UT) tablet, Take 1 tablet by mouth Daily., Disp: , Rfl:   •  ferrous sulfate 324 (65 Fe) MG tablet delayed-release EC tablet, Take 324 mg by mouth Daily With Breakfast., Disp: , Rfl:   •  glucagon (GLUCAGEN) 1 MG injection, Inject 1 mg into the appropriate muscle as directed by prescriber 1 (One) Time As Needed., Disp: , Rfl:   •  glucose blood test strip, 1 each by Other route 3 (Three) Times a Day As Needed., Disp: , Rfl:   •  insulin aspart (NovoLOG FlexPen) 100 UNIT/ML solution pen-injector sc pen, Inject 10 Units under the skin into the appropriate area as directed 3 (Three) Times a Day With Meals., Disp: , Rfl:   •  insulin detemir (Levemir FlexTouch) 100 UNIT/ML injection, Inject 18 Units under the skin into the appropriate area as directed Every Night., Disp: , Rfl:   •  losartan (COZAAR) 100 MG tablet, Take 1 tablet by mouth Daily., Disp: , Rfl:   •  Omega-3 Fatty Acids (fish oil) 1000 MG capsule capsule, Take 1,000 mg by mouth 2 (Two) Times a Day With Meals., Disp: , Rfl:   •  pantoprazole (PROTONIX) 40 MG EC tablet, Take 40 mg by mouth 2 (two) times a day., Disp: , Rfl:   •  SITagliptin (JANUVIA) 25 MG tablet, Take 25 mg by mouth Daily., Disp: , Rfl:   •  sodium bicarbonate 650 MG tablet, Take 650 mg by mouth 3 (Three) Times a Day., Disp: , Rfl:   •  sodium bicarbonate 650 MG tablet, Take 1 tablet by mouth 3 (Three) Times a Day., Disp: , Rfl:   •  vitamin E 1000 UNIT capsule, Take 1,000 Units by mouth Daily., Disp: , Rfl:   •  Zinc 50 MG capsule, Take 1 tablet by mouth Daily., Disp: , Rfl:   Allergies   Allergen Reactions   • Ibuprofen Other (See Comments) and Nausea And Vomiting      Kidney and liver problems     Social History     Socioeconomic History   • Marital status:    Tobacco Use   • Smoking status: Never Smoker   • Smokeless tobacco: Never Used   Vaping Use   • Vaping Use: Never used   Substance and Sexual Activity   • Alcohol use: Never   • Drug use: Never   • Sexual activity: Defer     Review of Systems   Constitutional: Negative for fever and malaise/fatigue.   Cardiovascular: Negative for chest pain, dyspnea on exertion and palpitations.   Respiratory: Negative for cough and shortness of breath.    Skin: Negative for rash.   Gastrointestinal: Negative for abdominal pain, nausea and vomiting.   Neurological: Negative for focal weakness and headaches.   All other systems reviewed and are negative.             Objective:     Constitutional:       Appearance: Well-developed.   Eyes:      General: No scleral icterus.     Conjunctiva/sclera: Conjunctivae normal.   HENT:      Head: Normocephalic and atraumatic.   Neck:      Vascular: No carotid bruit or JVD.   Pulmonary:      Effort: Pulmonary effort is normal.      Breath sounds: Normal breath sounds. No wheezing. No rales.   Cardiovascular:      Normal rate. Regular rhythm.   Pulses:     Intact distal pulses.   Abdominal:      General: Bowel sounds are normal.      Palpations: Abdomen is soft.   Musculoskeletal:      Cervical back: Normal range of motion and neck supple. Skin:     General: Skin is warm and dry.      Findings: No rash.   Neurological:      Mental Status: Alert.       Procedures    Lab Review:         MDM  1.  Palpitations  Patient has occasional episodes of tachycardia but is currently stable on medical therapy including carvedilol  2.  Hypertension  Patient blood pressure currently stable on medications  3.  Diabetes  Patient is on insulin and oral medicines and followed by the primary care doctor  #4 hyperlipidemia  Patient is on statins and the lipid levels are well within normal limit    Patient's previous  medical records, labs, and EKG were reviewed and discussed with the patient at today's visit.

## 2022-08-26 ENCOUNTER — TRANSCRIBE ORDERS (OUTPATIENT)
Dept: ADMINISTRATIVE | Facility: HOSPITAL | Age: 77
End: 2022-08-26

## 2022-08-26 DIAGNOSIS — N18.4 CHRONIC KIDNEY DISEASE, STAGE IV (SEVERE): Primary | ICD-10-CM

## 2022-08-31 ENCOUNTER — APPOINTMENT (OUTPATIENT)
Dept: CARDIOLOGY | Facility: HOSPITAL | Age: 77
End: 2022-08-31

## 2022-09-01 ENCOUNTER — HOSPITAL ENCOUNTER (INPATIENT)
Facility: HOSPITAL | Age: 77
LOS: 3 days | Discharge: HOME HEALTH | End: 2022-09-04
Attending: EMERGENCY MEDICINE | Admitting: HOSPITALIST

## 2022-09-01 ENCOUNTER — APPOINTMENT (OUTPATIENT)
Dept: GENERAL RADIOLOGY | Facility: HOSPITAL | Age: 77
End: 2022-09-01

## 2022-09-01 ENCOUNTER — APPOINTMENT (OUTPATIENT)
Dept: CT IMAGING | Facility: HOSPITAL | Age: 77
End: 2022-09-01

## 2022-09-01 DIAGNOSIS — E16.2 HYPOGLYCEMIA: ICD-10-CM

## 2022-09-01 DIAGNOSIS — S82.852A CLOSED TRIMALLEOLAR FRACTURE OF LEFT ANKLE, INITIAL ENCOUNTER: Primary | ICD-10-CM

## 2022-09-01 DIAGNOSIS — R00.1 BRADYCARDIA: ICD-10-CM

## 2022-09-01 PROBLEM — M81.0 OSTEOPOROSIS: Status: ACTIVE | Noted: 2022-03-02

## 2022-09-01 PROBLEM — E11.21 TYPE 2 DIABETES MELLITUS WITH DIABETIC NEPHROPATHY: Status: ACTIVE | Noted: 2021-12-15

## 2022-09-01 PROBLEM — R55 SYNCOPE: Status: ACTIVE | Noted: 2022-09-01

## 2022-09-01 LAB
ALBUMIN SERPL-MCNC: 4.2 G/DL (ref 3.5–5.2)
ALBUMIN/GLOB SERPL: 1.4 G/DL
ALP SERPL-CCNC: 30 U/L (ref 39–117)
ALT SERPL W P-5'-P-CCNC: 15 U/L (ref 1–33)
ANION GAP SERPL CALCULATED.3IONS-SCNC: 11 MMOL/L (ref 5–15)
APTT PPP: 23.4 SECONDS (ref 61–76.5)
AST SERPL-CCNC: 17 U/L (ref 1–32)
BACTERIA UR QL AUTO: ABNORMAL /HPF
BILIRUB SERPL-MCNC: 0.6 MG/DL (ref 0–1.2)
BILIRUB UR QL STRIP: NEGATIVE
BUN SERPL-MCNC: 39 MG/DL (ref 8–23)
BUN/CREAT SERPL: 19.4 (ref 7–25)
CALCIUM SPEC-SCNC: 9.5 MG/DL (ref 8.6–10.5)
CHLORIDE SERPL-SCNC: 101 MMOL/L (ref 98–107)
CLARITY UR: ABNORMAL
CO2 SERPL-SCNC: 26 MMOL/L (ref 22–29)
COLOR UR: YELLOW
CREAT SERPL-MCNC: 2.01 MG/DL (ref 0.57–1)
DEPRECATED RDW RBC AUTO: 42.9 FL (ref 37–54)
EGFRCR SERPLBLD CKD-EPI 2021: 25.6 ML/MIN/1.73
EOSINOPHIL # BLD MANUAL: 0.14 10*3/MM3 (ref 0–0.4)
EOSINOPHIL NFR BLD MANUAL: 1 % (ref 0.3–6.2)
ERYTHROCYTE [DISTWIDTH] IN BLOOD BY AUTOMATED COUNT: 13.5 % (ref 12.3–15.4)
GLOBULIN UR ELPH-MCNC: 3.1 GM/DL
GLUCOSE BLDC GLUCOMTR-MCNC: 127 MG/DL (ref 70–105)
GLUCOSE BLDC GLUCOMTR-MCNC: 197 MG/DL (ref 70–105)
GLUCOSE SERPL-MCNC: 128 MG/DL (ref 65–99)
GLUCOSE UR STRIP-MCNC: NEGATIVE MG/DL
HCT VFR BLD AUTO: 36.7 % (ref 34–46.6)
HGB BLD-MCNC: 11.9 G/DL (ref 12–15.9)
HGB UR QL STRIP.AUTO: ABNORMAL
HYALINE CASTS UR QL AUTO: ABNORMAL /LPF
INR PPP: 1.03 (ref 0.93–1.1)
KETONES UR QL STRIP: ABNORMAL
LEUKOCYTE ESTERASE UR QL STRIP.AUTO: ABNORMAL
LYMPHOCYTES # BLD MANUAL: 1.76 10*3/MM3 (ref 0.7–3.1)
LYMPHOCYTES NFR BLD MANUAL: 5 % (ref 5–12)
MAGNESIUM SERPL-MCNC: 1.7 MG/DL (ref 1.6–2.4)
MCH RBC QN AUTO: 29.7 PG (ref 26.6–33)
MCHC RBC AUTO-ENTMCNC: 32.4 G/DL (ref 31.5–35.7)
MCV RBC AUTO: 91.8 FL (ref 79–97)
METAMYELOCYTES NFR BLD MANUAL: 1 % (ref 0–0)
MONOCYTES # BLD: 0.68 10*3/MM3 (ref 0.1–0.9)
NEUTROPHILS # BLD AUTO: 10.8 10*3/MM3 (ref 1.7–7)
NEUTROPHILS NFR BLD MANUAL: 77 % (ref 42.7–76)
NEUTS BAND NFR BLD MANUAL: 3 % (ref 0–5)
NITRITE UR QL STRIP: NEGATIVE
PH UR STRIP.AUTO: 6 [PH] (ref 5–8)
PLAT MORPH BLD: NORMAL
PLATELET # BLD AUTO: 294 10*3/MM3 (ref 140–450)
PMV BLD AUTO: 7.1 FL (ref 6–12)
POTASSIUM SERPL-SCNC: 4.6 MMOL/L (ref 3.5–5.2)
PROT SERPL-MCNC: 7.3 G/DL (ref 6–8.5)
PROT UR QL STRIP: ABNORMAL
PROTHROMBIN TIME: 10.6 SECONDS (ref 9.6–11.7)
RBC # BLD AUTO: 3.99 10*6/MM3 (ref 3.77–5.28)
RBC # UR STRIP: ABNORMAL /HPF
RBC MORPH BLD: NORMAL
REF LAB TEST METHOD: ABNORMAL
SCAN SLIDE: NORMAL
SODIUM SERPL-SCNC: 138 MMOL/L (ref 136–145)
SP GR UR STRIP: 1.01 (ref 1–1.03)
SQUAMOUS #/AREA URNS HPF: ABNORMAL /HPF
TOXIC GRANULATION: ABNORMAL
TROPONIN T SERPL-MCNC: <0.01 NG/ML (ref 0–0.03)
UROBILINOGEN UR QL STRIP: ABNORMAL
VARIANT LYMPHS NFR BLD MANUAL: 13 % (ref 19.6–45.3)
WBC # UR STRIP: ABNORMAL /HPF
WBC NRBC COR # BLD: 13.5 10*3/MM3 (ref 3.4–10.8)

## 2022-09-01 PROCEDURE — 82962 GLUCOSE BLOOD TEST: CPT

## 2022-09-01 PROCEDURE — 81001 URINALYSIS AUTO W/SCOPE: CPT | Performed by: EMERGENCY MEDICINE

## 2022-09-01 PROCEDURE — 83735 ASSAY OF MAGNESIUM: CPT | Performed by: EMERGENCY MEDICINE

## 2022-09-01 PROCEDURE — 85007 BL SMEAR W/DIFF WBC COUNT: CPT | Performed by: EMERGENCY MEDICINE

## 2022-09-01 PROCEDURE — 84484 ASSAY OF TROPONIN QUANT: CPT | Performed by: EMERGENCY MEDICINE

## 2022-09-01 PROCEDURE — 85610 PROTHROMBIN TIME: CPT | Performed by: EMERGENCY MEDICINE

## 2022-09-01 PROCEDURE — 70450 CT HEAD/BRAIN W/O DYE: CPT

## 2022-09-01 PROCEDURE — 25010000002 HYDROMORPHONE 1 MG/ML SOLUTION: Performed by: EMERGENCY MEDICINE

## 2022-09-01 PROCEDURE — 25010000002 CEFTRIAXONE PER 250 MG: Performed by: EMERGENCY MEDICINE

## 2022-09-01 PROCEDURE — 80053 COMPREHEN METABOLIC PANEL: CPT | Performed by: EMERGENCY MEDICINE

## 2022-09-01 PROCEDURE — 93005 ELECTROCARDIOGRAM TRACING: CPT

## 2022-09-01 PROCEDURE — 25010000002 ONDANSETRON PER 1 MG: Performed by: EMERGENCY MEDICINE

## 2022-09-01 PROCEDURE — G0378 HOSPITAL OBSERVATION PER HR: HCPCS

## 2022-09-01 PROCEDURE — 85730 THROMBOPLASTIN TIME PARTIAL: CPT | Performed by: EMERGENCY MEDICINE

## 2022-09-01 PROCEDURE — 87040 BLOOD CULTURE FOR BACTERIA: CPT | Performed by: EMERGENCY MEDICINE

## 2022-09-01 PROCEDURE — 63710000001 INSULIN GLARGINE PER 5 UNITS: Performed by: NURSE PRACTITIONER

## 2022-09-01 PROCEDURE — 99222 1ST HOSP IP/OBS MODERATE 55: CPT | Performed by: NURSE PRACTITIONER

## 2022-09-01 PROCEDURE — 72125 CT NECK SPINE W/O DYE: CPT

## 2022-09-01 PROCEDURE — 85025 COMPLETE CBC W/AUTO DIFF WBC: CPT | Performed by: EMERGENCY MEDICINE

## 2022-09-01 PROCEDURE — 36415 COLL VENOUS BLD VENIPUNCTURE: CPT

## 2022-09-01 PROCEDURE — 71045 X-RAY EXAM CHEST 1 VIEW: CPT

## 2022-09-01 PROCEDURE — 73610 X-RAY EXAM OF ANKLE: CPT

## 2022-09-01 PROCEDURE — 99285 EMERGENCY DEPT VISIT HI MDM: CPT

## 2022-09-01 RX ORDER — SODIUM CHLORIDE 0.9 % (FLUSH) 0.9 %
10 SYRINGE (ML) INJECTION AS NEEDED
Status: DISCONTINUED | OUTPATIENT
Start: 2022-09-01 | End: 2022-09-04 | Stop reason: HOSPADM

## 2022-09-01 RX ORDER — ACETAMINOPHEN 325 MG/1
650 TABLET ORAL EVERY 4 HOURS PRN
Status: DISCONTINUED | OUTPATIENT
Start: 2022-09-01 | End: 2022-09-04 | Stop reason: HOSPADM

## 2022-09-01 RX ORDER — ONDANSETRON 2 MG/ML
4 INJECTION INTRAMUSCULAR; INTRAVENOUS EVERY 6 HOURS PRN
Status: DISCONTINUED | OUTPATIENT
Start: 2022-09-01 | End: 2022-09-03

## 2022-09-01 RX ORDER — SODIUM CHLORIDE 9 MG/ML
100 INJECTION, SOLUTION INTRAVENOUS CONTINUOUS
Status: DISCONTINUED | OUTPATIENT
Start: 2022-09-01 | End: 2022-09-04 | Stop reason: HOSPADM

## 2022-09-01 RX ORDER — ENOXAPARIN SODIUM 100 MG/ML
30 INJECTION SUBCUTANEOUS DAILY
Status: DISCONTINUED | OUTPATIENT
Start: 2022-09-02 | End: 2022-09-03

## 2022-09-01 RX ORDER — ATORVASTATIN CALCIUM 40 MG/1
40 TABLET, FILM COATED ORAL DAILY
Status: DISCONTINUED | OUTPATIENT
Start: 2022-09-02 | End: 2022-09-04 | Stop reason: HOSPADM

## 2022-09-01 RX ORDER — SODIUM CHLORIDE 0.9 % (FLUSH) 0.9 %
10 SYRINGE (ML) INJECTION EVERY 12 HOURS SCHEDULED
Status: DISCONTINUED | OUTPATIENT
Start: 2022-09-01 | End: 2022-09-04 | Stop reason: HOSPADM

## 2022-09-01 RX ORDER — HYDRALAZINE HYDROCHLORIDE 20 MG/ML
20 INJECTION INTRAMUSCULAR; INTRAVENOUS EVERY 6 HOURS PRN
Status: DISCONTINUED | OUTPATIENT
Start: 2022-09-01 | End: 2022-09-04 | Stop reason: HOSPADM

## 2022-09-01 RX ORDER — ONDANSETRON 2 MG/ML
4 INJECTION INTRAMUSCULAR; INTRAVENOUS ONCE
Status: COMPLETED | OUTPATIENT
Start: 2022-09-01 | End: 2022-09-01

## 2022-09-01 RX ORDER — HYDROCODONE BITARTRATE AND ACETAMINOPHEN 7.5; 325 MG/1; MG/1
1 TABLET ORAL EVERY 6 HOURS PRN
Status: DISCONTINUED | OUTPATIENT
Start: 2022-09-01 | End: 2022-09-02

## 2022-09-01 RX ORDER — ACETAMINOPHEN 160 MG/5ML
650 SOLUTION ORAL EVERY 4 HOURS PRN
Status: DISCONTINUED | OUTPATIENT
Start: 2022-09-01 | End: 2022-09-04 | Stop reason: HOSPADM

## 2022-09-01 RX ORDER — PANTOPRAZOLE SODIUM 40 MG/1
40 TABLET, DELAYED RELEASE ORAL
Status: DISCONTINUED | OUTPATIENT
Start: 2022-09-02 | End: 2022-09-04 | Stop reason: HOSPADM

## 2022-09-01 RX ORDER — OLANZAPINE 10 MG/2ML
1 INJECTION, POWDER, LYOPHILIZED, FOR SOLUTION INTRAMUSCULAR
Status: DISCONTINUED | OUTPATIENT
Start: 2022-09-01 | End: 2022-09-04 | Stop reason: HOSPADM

## 2022-09-01 RX ORDER — ONDANSETRON 4 MG/1
4 TABLET, FILM COATED ORAL EVERY 6 HOURS PRN
Status: DISCONTINUED | OUTPATIENT
Start: 2022-09-01 | End: 2022-09-03

## 2022-09-01 RX ORDER — FERROUS SULFATE TAB EC 324 MG (65 MG FE EQUIVALENT) 324 (65 FE) MG
324 TABLET DELAYED RESPONSE ORAL
Status: DISCONTINUED | OUTPATIENT
Start: 2022-09-02 | End: 2022-09-04 | Stop reason: HOSPADM

## 2022-09-01 RX ORDER — DEXTROSE MONOHYDRATE 25 G/50ML
25 INJECTION, SOLUTION INTRAVENOUS
Status: DISCONTINUED | OUTPATIENT
Start: 2022-09-01 | End: 2022-09-04 | Stop reason: HOSPADM

## 2022-09-01 RX ORDER — SODIUM BICARBONATE 650 MG/1
650 TABLET ORAL 3 TIMES DAILY
Status: DISCONTINUED | OUTPATIENT
Start: 2022-09-01 | End: 2022-09-04 | Stop reason: HOSPADM

## 2022-09-01 RX ORDER — ALPRAZOLAM 0.5 MG/1
0.5 TABLET ORAL ONCE
Status: COMPLETED | OUTPATIENT
Start: 2022-09-01 | End: 2022-09-01

## 2022-09-01 RX ORDER — NICOTINE POLACRILEX 4 MG
15 LOZENGE BUCCAL
Status: DISCONTINUED | OUTPATIENT
Start: 2022-09-01 | End: 2022-09-04 | Stop reason: HOSPADM

## 2022-09-01 RX ORDER — LOSARTAN POTASSIUM 50 MG/1
100 TABLET ORAL DAILY
Status: DISCONTINUED | OUTPATIENT
Start: 2022-09-02 | End: 2022-09-04 | Stop reason: HOSPADM

## 2022-09-01 RX ORDER — AMLODIPINE BESYLATE 5 MG/1
10 TABLET ORAL DAILY
Status: DISCONTINUED | OUTPATIENT
Start: 2022-09-02 | End: 2022-09-04 | Stop reason: HOSPADM

## 2022-09-01 RX ORDER — ACETAMINOPHEN 650 MG/1
650 SUPPOSITORY RECTAL EVERY 4 HOURS PRN
Status: DISCONTINUED | OUTPATIENT
Start: 2022-09-01 | End: 2022-09-04 | Stop reason: HOSPADM

## 2022-09-01 RX ORDER — INSULIN LISPRO 100 [IU]/ML
0-7 INJECTION, SOLUTION INTRAVENOUS; SUBCUTANEOUS
Status: DISCONTINUED | OUTPATIENT
Start: 2022-09-02 | End: 2022-09-04 | Stop reason: HOSPADM

## 2022-09-01 RX ADMIN — ALPRAZOLAM 0.5 MG: 0.5 TABLET ORAL at 22:31

## 2022-09-01 RX ADMIN — SODIUM BICARBONATE 650 MG TABLET 650 MG: at 22:31

## 2022-09-01 RX ADMIN — Medication 10 ML: at 21:30

## 2022-09-01 RX ADMIN — INSULIN GLARGINE 18 UNITS: 100 INJECTION, SOLUTION SUBCUTANEOUS at 22:31

## 2022-09-01 RX ADMIN — SODIUM CHLORIDE 100 ML/HR: 9 INJECTION, SOLUTION INTRAVENOUS at 21:30

## 2022-09-01 RX ADMIN — HYDROMORPHONE HYDROCHLORIDE 0.5 MG: 1 INJECTION, SOLUTION INTRAMUSCULAR; INTRAVENOUS; SUBCUTANEOUS at 17:38

## 2022-09-01 RX ADMIN — HYDROCODONE BITARTRATE AND ACETAMINOPHEN 1 TABLET: 7.5; 325 TABLET ORAL at 21:07

## 2022-09-01 RX ADMIN — ONDANSETRON 4 MG: 2 INJECTION INTRAMUSCULAR; INTRAVENOUS at 17:38

## 2022-09-01 RX ADMIN — Medication 10 ML: at 21:09

## 2022-09-01 RX ADMIN — CEFTRIAXONE 2 G: 2 INJECTION, POWDER, FOR SOLUTION INTRAMUSCULAR; INTRAVENOUS at 18:52

## 2022-09-01 NOTE — ED PROVIDER NOTES
Subjective   Chief complaint: Patient is a pleasant 76-year-old.  She was alerted by her blood glucose monitoring device that her glucose was low at 57.  She was going to take some glucose tablets but then decided on a Snickers bar.  In between this she was sweaty and passed out.  She fell to the ground.  [place].  Has pain in her left ankle.  Hit the back of her head.  No severe headache.  Has some neck pain is present as well.  EMS arrived and gave her 2 glucose tablets and transferred her in route.  She does present bradycardic.  No history of bradycardia that she knows of.  She is not having any chest pain or shortness of breath or palpitations I did not have any prior to passing out.  She has some generalized fatigue and weakness.    Context: At home when this happened    Duration: Sudden onset.  She was unconscious for a minute.    Timing: Sudden onset    Severity: Pain in her ankle is moderately severe    Associated Symptoms:         PCP:  LMP:          Review of Systems   Constitutional: Positive for fatigue.   Respiratory: Negative for shortness of breath.    Cardiovascular: Negative for chest pain.   Gastrointestinal: Negative for abdominal pain.   Musculoskeletal: Positive for arthralgias.   Skin: Negative.    Neurological: Positive for syncope.   All other systems reviewed and are negative.      Past Medical History:   Diagnosis Date   • Arthritis    • CKD (chronic kidney disease)    • Diabetes mellitus (HCC)    • Hyperlipidemia    • Hypertension        Allergies   Allergen Reactions   • Ibuprofen Other (See Comments) and Nausea And Vomiting     Kidney and liver problems       Past Surgical History:   Procedure Laterality Date   •  SECTION      x2   • CHOLECYSTECTOMY     • HYSTERECTOMY     • INCONTINENCE SURGERY     • TONSILLECTOMY         Family History   Problem Relation Age of Onset   • Arrhythmia Sister         has pacemaker   • Heart disease Brother         bypass sx       Social History      Socioeconomic History   • Marital status:    Tobacco Use   • Smoking status: Never Smoker   • Smokeless tobacco: Never Used   Vaping Use   • Vaping Use: Never used   Substance and Sexual Activity   • Alcohol use: Never   • Drug use: Never   • Sexual activity: Defer           Objective   Physical Exam  Vitals and nursing note reviewed.   Constitutional:       Appearance: Normal appearance.   HENT:      Head: Normocephalic and atraumatic.   Eyes:      Extraocular Movements: Extraocular movements intact.   Cardiovascular:      Rate and Rhythm: Regular rhythm. Bradycardia present.   Pulmonary:      Effort: Pulmonary effort is normal.      Breath sounds: Normal breath sounds.   Abdominal:      Tenderness: There is no abdominal tenderness.   Musculoskeletal:      Cervical back: Neck supple.      Left ankle: Swelling present. Tenderness present. Decreased range of motion. Normal pulse.        Legs:    Skin:     General: Skin is warm and dry.   Neurological:      Mental Status: She is alert.   Psychiatric:         Mood and Affect: Mood normal.         Thought Content: Thought content normal.         Splint - Cast - Strapping    Date/Time: 9/1/2022 6:17 PM  Performed by: Alejandro Vargas DO  Authorized by: Alejandro Vargas DO     Consent:     Consent obtained:  Verbal    Consent given by:  Patient    Risks discussed:  Discoloration, numbness, pain and swelling    Alternatives discussed:  No treatment  Universal protocol:     Procedure explained and questions answered to patient or proxy's satisfaction: yes      Patient identity confirmed:  Verbally with patient  Pre-procedure details:     Distal neurologic exam:  Normal    Distal perfusion: distal pulses strong and brisk capillary refill    Procedure details:     Location:  Ankle    Ankle location:  L ankle    Strapping: yes      Cast type:  Short leg    Splint type:  Short leg    Supplies:  Fiberglass    Attestation: Splint applied and adjusted  personally by me    Post-procedure details:     Distal neurologic exam:  Normal    Distal perfusion: distal pulses strong      Procedure completion:  Tolerated well, no immediate complications               ED Course      EKG sinus rhythm ventricular trigeminy.  Rate of 72           Results for orders placed or performed during the hospital encounter of 09/01/22   Comprehensive Metabolic Panel    Specimen: Blood   Result Value Ref Range    Glucose 128 (H) 65 - 99 mg/dL    BUN 39 (H) 8 - 23 mg/dL    Creatinine 2.01 (H) 0.57 - 1.00 mg/dL    Sodium 138 136 - 145 mmol/L    Potassium 4.6 3.5 - 5.2 mmol/L    Chloride 101 98 - 107 mmol/L    CO2 26.0 22.0 - 29.0 mmol/L    Calcium 9.5 8.6 - 10.5 mg/dL    Total Protein 7.3 6.0 - 8.5 g/dL    Albumin 4.20 3.50 - 5.20 g/dL    ALT (SGPT) 15 1 - 33 U/L    AST (SGOT) 17 1 - 32 U/L    Alkaline Phosphatase 30 (L) 39 - 117 U/L    Total Bilirubin 0.6 0.0 - 1.2 mg/dL    Globulin 3.1 gm/dL    A/G Ratio 1.4 g/dL    BUN/Creatinine Ratio 19.4 7.0 - 25.0    Anion Gap 11.0 5.0 - 15.0 mmol/L    eGFR 25.6 (L) >60.0 mL/min/1.73   Protime-INR    Specimen: Blood   Result Value Ref Range    Protime 10.6 9.6 - 11.7 Seconds    INR 1.03 0.93 - 1.10   aPTT    Specimen: Blood   Result Value Ref Range    PTT 23.4 (L) 61.0 - 76.5 seconds   Urinalysis With Microscopic If Indicated (No Culture) - Urine, Clean Catch    Specimen: Urine, Clean Catch   Result Value Ref Range    Color, UA Yellow Yellow, Straw    Appearance, UA Turbid (A) Clear    pH, UA 6.0 5.0 - 8.0    Specific Gravity, UA 1.015 1.005 - 1.030    Glucose, UA Negative Negative    Ketones, UA Trace (A) Negative    Bilirubin, UA Negative Negative    Blood, UA Trace (A) Negative    Protein, UA 30 mg/dL (1+) (A) Negative    Leuk Esterase, UA Large (3+) (A) Negative    Nitrite, UA Negative Negative    Urobilinogen, UA 0.2 E.U./dL 0.2 - 1.0 E.U./dL   Troponin    Specimen: Blood   Result Value Ref Range    Troponin T <0.010 0.000 - 0.030 ng/mL    Magnesium    Specimen: Blood   Result Value Ref Range    Magnesium 1.7 1.6 - 2.4 mg/dL   CBC Auto Differential    Specimen: Blood   Result Value Ref Range    WBC 13.50 (H) 3.40 - 10.80 10*3/mm3    RBC 3.99 3.77 - 5.28 10*6/mm3    Hemoglobin 11.9 (L) 12.0 - 15.9 g/dL    Hematocrit 36.7 34.0 - 46.6 %    MCV 91.8 79.0 - 97.0 fL    MCH 29.7 26.6 - 33.0 pg    MCHC 32.4 31.5 - 35.7 g/dL    RDW 13.5 12.3 - 15.4 %    RDW-SD 42.9 37.0 - 54.0 fl    MPV 7.1 6.0 - 12.0 fL    Platelets 294 140 - 450 10*3/mm3   Scan Slide    Specimen: Blood   Result Value Ref Range    Scan Slide     Manual Differential    Specimen: Blood   Result Value Ref Range    Neutrophil % 77.0 (H) 42.7 - 76.0 %    Lymphocyte % 13.0 (L) 19.6 - 45.3 %    Monocyte % 5.0 5.0 - 12.0 %    Eosinophil % 1.0 0.3 - 6.2 %    Bands %  3.0 0.0 - 5.0 %    Metamyelocyte % 1.0 (H) 0.0 - 0.0 %    Neutrophils Absolute 10.80 (H) 1.70 - 7.00 10*3/mm3    Lymphocytes Absolute 1.76 0.70 - 3.10 10*3/mm3    Monocytes Absolute 0.68 0.10 - 0.90 10*3/mm3    Eosinophils Absolute 0.14 0.00 - 0.40 10*3/mm3    RBC Morphology Normal Normal    Toxic Granulation Slight/1+ None Seen    Platelet Morphology Normal Normal   Urinalysis, Microscopic Only - Urine, Clean Catch    Specimen: Urine, Clean Catch   Result Value Ref Range    RBC, UA None Seen None Seen /HPF    WBC, UA Too Numerous to Count (A) None Seen /HPF    Bacteria, UA 4+ (A) None Seen /HPF    Squamous Epithelial Cells, UA None Seen None Seen, 0-2 /HPF    Hyaline Casts, UA None Seen None Seen /LPF    Methodology Manual Light Microscopy    POC Glucose Once    Specimen: Blood   Result Value Ref Range    Glucose 127 (H) 70 - 105 mg/dL   ECG 12 Lead   Result Value Ref Range    QT Interval 495 ms                            XR Ankle 3+ View Left    Result Date: 9/1/2022  Trimalleolar fracture. Mild displacement medial malleolus and distal fibula. Posterior malleoli or fracture fragment is not significantly displaced. Ankle mortise  appears disrupted.  Electronically Signed By-Randi Stanford MD On:9/1/2022 3:54 PM This report was finalized on [id number] by  Randi Stanford MD.    CT Head Without Contrast    Result Date: 9/1/2022  1. No intracranial hemorrhage or acute intracranial abnormality. 2. Mild white matter findings suggesting chronic microvascular disease.  Electronically Signed By-Tong Lozoya MD On:9/1/2022 4:11 PM This report was finalized on [id number] by  Tong Lozoya MD.    CT Cervical Spine Without Contrast    Result Date: 9/1/2022  1. Negative for cervical spine fracture. 2. Multilevel cervical degenerative findings above.  Electronically Signed By-Tong Lozoya MD On:9/1/2022 4:26 PM This report was finalized on [id number] by  Tong Lozoya MD.    XR Chest 1 View    Result Date: 9/1/2022  No acute cardiopulmonary abnormality.  Electronically Signed By-Randi Stanford MD On:9/1/2022 3:53 PM This report was finalized on [id number] by  Randi Stanford MD.           MDM  Number of Diagnoses or Management Options  Bradycardia  Closed trimalleolar fracture of left ankle, initial encounter  Hypoglycemia  Diagnosis management comments: Patient remained bradycardic for quite some time here in the emergency department.  It was sinus bradycardia and she was not hypotensive.  Potentially from her hypoglycemic spell but with a prolonged episode will bring into the hospital for further cardiac monitoring.  She did have UTI as well.  She was given antibiotics.  Her blood glucose continued to not drop.  Currently 200.  I did place a splint here in the emergency department for her trimalleolar fracture.  Dr. Caraballo has been consulted and is aware.  Discussed the case with Skye on-call for the hospitalist who agrees to admit.  Patient verbalized understanding of admission.       Amount and/or Complexity of Data Reviewed  Clinical lab tests: reviewed  Tests in the radiology section of CPT®: reviewed  Tests in the medicine section of CPT®:  reviewed  Discussion of test results with the performing providers: yes  Decide to obtain previous medical records or to obtain history from someone other than the patient: yes  Discuss the patient with other providers: yes  Independent visualization of images, tracings, or specimens: yes    Patient Progress  Patient progress: stable      Final diagnoses:   None   Bradycardia  Syncope  Hypoglycemia  UTI  Left trimalleolar fracture    ED Disposition  ED Disposition     None          No follow-up provider specified.       Medication List      No changes were made to your prescriptions during this visit.          Alejandro Vargas,   09/08/22 [id number]

## 2022-09-01 NOTE — ED NOTES
Pt here c/o having a syncopal episode while at the [facility].  Pt sts she took her humalog insulin 10u this morning and hadn't ate much.  Pt c/o left ankle pain.

## 2022-09-02 ENCOUNTER — APPOINTMENT (OUTPATIENT)
Dept: CARDIOLOGY | Facility: HOSPITAL | Age: 77
End: 2022-09-02

## 2022-09-02 PROBLEM — S82.852A CLOSED TRIMALLEOLAR FRACTURE OF LEFT ANKLE, INITIAL ENCOUNTER: Status: ACTIVE | Noted: 2022-09-02

## 2022-09-02 LAB
ANION GAP SERPL CALCULATED.3IONS-SCNC: 10 MMOL/L (ref 5–15)
BASOPHILS # BLD AUTO: 0 10*3/MM3 (ref 0–0.2)
BASOPHILS NFR BLD AUTO: 0.3 % (ref 0–1.5)
BH CV ECHO MEAS - ACS: 1.98 CM
BH CV ECHO MEAS - AO MAX PG: 6 MMHG
BH CV ECHO MEAS - AO MEAN PG: 3.1 MMHG
BH CV ECHO MEAS - AO ROOT DIAM: 3.1 CM
BH CV ECHO MEAS - AO V2 MAX: 122.4 CM/SEC
BH CV ECHO MEAS - AO V2 VTI: 31 CM
BH CV ECHO MEAS - AVA(I,D): 2.47 CM2
BH CV ECHO MEAS - EDV(CUBED): 86.1 ML
BH CV ECHO MEAS - EDV(MOD-SP4): 57.2 ML
BH CV ECHO MEAS - EF(MOD-BP): 58 %
BH CV ECHO MEAS - EF(MOD-SP4): 57.6 %
BH CV ECHO MEAS - ESV(CUBED): 29.2 ML
BH CV ECHO MEAS - ESV(MOD-SP4): 24.2 ML
BH CV ECHO MEAS - FS: 30.3 %
BH CV ECHO MEAS - IVS/LVPW: 0.84 CM
BH CV ECHO MEAS - IVSD: 1.03 CM
BH CV ECHO MEAS - LA A2CS (ATRIAL LENGTH): 3.9 CM
BH CV ECHO MEAS - LV DIASTOLIC VOL/BSA (35-75): 29 CM2
BH CV ECHO MEAS - LV MASS(C)D: 176.6 GRAMS
BH CV ECHO MEAS - LV MAX PG: 4.3 MMHG
BH CV ECHO MEAS - LV MEAN PG: 2.28 MMHG
BH CV ECHO MEAS - LV SYSTOLIC VOL/BSA (12-30): 12.3 CM2
BH CV ECHO MEAS - LV V1 MAX: 103.9 CM/SEC
BH CV ECHO MEAS - LV V1 VTI: 28.3 CM
BH CV ECHO MEAS - LVIDD: 4.4 CM
BH CV ECHO MEAS - LVIDS: 3.1 CM
BH CV ECHO MEAS - LVOT AREA: 2.7 CM2
BH CV ECHO MEAS - LVOT DIAM: 1.85 CM
BH CV ECHO MEAS - LVPWD: 1.23 CM
BH CV ECHO MEAS - MV A MAX VEL: 101.2 CM/SEC
BH CV ECHO MEAS - MV DEC SLOPE: 290 CM/SEC2
BH CV ECHO MEAS - MV DEC TIME: 0.27 MSEC
BH CV ECHO MEAS - MV E MAX VEL: 77.5 CM/SEC
BH CV ECHO MEAS - MV E/A: 0.77
BH CV ECHO MEAS - MV MAX PG: 5.1 MMHG
BH CV ECHO MEAS - MV MEAN PG: 1.98 MMHG
BH CV ECHO MEAS - MV V2 VTI: 34.2 CM
BH CV ECHO MEAS - MVA(VTI): 2.24 CM2
BH CV ECHO MEAS - PA ACC TIME: 0.06 SEC
BH CV ECHO MEAS - PA PR(ACCEL): 49.9 MMHG
BH CV ECHO MEAS - PA V2 MAX: 114.6 CM/SEC
BH CV ECHO MEAS - RV MAX PG: 2.8 MMHG
BH CV ECHO MEAS - RV V1 MAX: 84 CM/SEC
BH CV ECHO MEAS - RV V1 VTI: 23.8 CM
BH CV ECHO MEAS - RVDD: 2.8 CM
BH CV ECHO MEAS - SI(MOD-SP4): 16.7 ML/M2
BH CV ECHO MEAS - SV(LVOT): 76.4 ML
BH CV ECHO MEAS - SV(MOD-SP4): 32.9 ML
BH CV ECHO MEAS - TR MAX PG: 5.3 MMHG
BH CV ECHO MEAS - TR MAX VEL: 115.4 CM/SEC
BUN SERPL-MCNC: 36 MG/DL (ref 8–23)
BUN/CREAT SERPL: 20.7 (ref 7–25)
CALCIUM SPEC-SCNC: 8.9 MG/DL (ref 8.6–10.5)
CHLORIDE SERPL-SCNC: 102 MMOL/L (ref 98–107)
CO2 SERPL-SCNC: 25 MMOL/L (ref 22–29)
CREAT SERPL-MCNC: 1.74 MG/DL (ref 0.57–1)
D-LACTATE SERPL-SCNC: 1 MMOL/L (ref 0.5–2)
DEPRECATED RDW RBC AUTO: 42.4 FL (ref 37–54)
EGFRCR SERPLBLD CKD-EPI 2021: 30.5 ML/MIN/1.73
EOSINOPHIL # BLD AUTO: 0 10*3/MM3 (ref 0–0.4)
EOSINOPHIL NFR BLD AUTO: 0.5 % (ref 0.3–6.2)
ERYTHROCYTE [DISTWIDTH] IN BLOOD BY AUTOMATED COUNT: 13.3 % (ref 12.3–15.4)
GLUCOSE BLDC GLUCOMTR-MCNC: 119 MG/DL (ref 70–105)
GLUCOSE BLDC GLUCOMTR-MCNC: 173 MG/DL (ref 70–105)
GLUCOSE BLDC GLUCOMTR-MCNC: 183 MG/DL (ref 70–105)
GLUCOSE BLDC GLUCOMTR-MCNC: 200 MG/DL (ref 70–105)
GLUCOSE BLDC GLUCOMTR-MCNC: 273 MG/DL (ref 70–105)
GLUCOSE SERPL-MCNC: 157 MG/DL (ref 65–99)
HCT VFR BLD AUTO: 33.7 % (ref 34–46.6)
HGB BLD-MCNC: 11.2 G/DL (ref 12–15.9)
LYMPHOCYTES # BLD AUTO: 2.2 10*3/MM3 (ref 0.7–3.1)
LYMPHOCYTES NFR BLD AUTO: 22.9 % (ref 19.6–45.3)
MAXIMAL PREDICTED HEART RATE: 146 BPM
MCH RBC QN AUTO: 30.4 PG (ref 26.6–33)
MCHC RBC AUTO-ENTMCNC: 33.3 G/DL (ref 31.5–35.7)
MCV RBC AUTO: 91.3 FL (ref 79–97)
MONOCYTES # BLD AUTO: 1 10*3/MM3 (ref 0.1–0.9)
MONOCYTES NFR BLD AUTO: 10.5 % (ref 5–12)
NEUTROPHILS NFR BLD AUTO: 6.3 10*3/MM3 (ref 1.7–7)
NEUTROPHILS NFR BLD AUTO: 65.8 % (ref 42.7–76)
NRBC BLD AUTO-RTO: 0.1 /100 WBC (ref 0–0.2)
PLATELET # BLD AUTO: 261 10*3/MM3 (ref 140–450)
PMV BLD AUTO: 7.8 FL (ref 6–12)
POTASSIUM SERPL-SCNC: 4.7 MMOL/L (ref 3.5–5.2)
QT INTERVAL: 495 MS
RBC # BLD AUTO: 3.69 10*6/MM3 (ref 3.77–5.28)
SODIUM SERPL-SCNC: 137 MMOL/L (ref 136–145)
STRESS TARGET HR: 124 BPM
TROPONIN T SERPL-MCNC: <0.01 NG/ML (ref 0–0.03)
WBC NRBC COR # BLD: 9.5 10*3/MM3 (ref 3.4–10.8)

## 2022-09-02 PROCEDURE — 63710000001 INSULIN LISPRO (HUMAN) PER 5 UNITS: Performed by: NURSE PRACTITIONER

## 2022-09-02 PROCEDURE — 25010000002 ENOXAPARIN PER 10 MG: Performed by: NURSE PRACTITIONER

## 2022-09-02 PROCEDURE — 82962 GLUCOSE BLOOD TEST: CPT

## 2022-09-02 PROCEDURE — 80048 BASIC METABOLIC PNL TOTAL CA: CPT | Performed by: NURSE PRACTITIONER

## 2022-09-02 PROCEDURE — 93306 TTE W/DOPPLER COMPLETE: CPT

## 2022-09-02 PROCEDURE — 85025 COMPLETE CBC W/AUTO DIFF WBC: CPT | Performed by: NURSE PRACTITIONER

## 2022-09-02 PROCEDURE — 99222 1ST HOSP IP/OBS MODERATE 55: CPT | Performed by: INTERNAL MEDICINE

## 2022-09-02 PROCEDURE — 93306 TTE W/DOPPLER COMPLETE: CPT | Performed by: INTERNAL MEDICINE

## 2022-09-02 PROCEDURE — 84484 ASSAY OF TROPONIN QUANT: CPT | Performed by: NURSE PRACTITIONER

## 2022-09-02 PROCEDURE — 63710000001 INSULIN GLARGINE PER 5 UNITS: Performed by: NURSE PRACTITIONER

## 2022-09-02 PROCEDURE — 25010000002 CEFTRIAXONE PER 250 MG: Performed by: NURSE PRACTITIONER

## 2022-09-02 PROCEDURE — 83605 ASSAY OF LACTIC ACID: CPT | Performed by: NURSE PRACTITIONER

## 2022-09-02 PROCEDURE — 99232 SBSQ HOSP IP/OBS MODERATE 35: CPT | Performed by: HOSPITALIST

## 2022-09-02 PROCEDURE — 25010000002 HYDRALAZINE PER 20 MG: Performed by: NURSE PRACTITIONER

## 2022-09-02 RX ORDER — HYDROCODONE BITARTRATE AND ACETAMINOPHEN 7.5; 325 MG/1; MG/1
1 TABLET ORAL EVERY 4 HOURS PRN
Status: DISCONTINUED | OUTPATIENT
Start: 2022-09-02 | End: 2022-09-03

## 2022-09-02 RX ORDER — INSULIN LISPRO 100 [IU]/ML
10 INJECTION, SOLUTION INTRAVENOUS; SUBCUTANEOUS
COMMUNITY

## 2022-09-02 RX ORDER — GLUCAGON INJECTION, SOLUTION 1 MG/.2ML
1 INJECTION, SOLUTION SUBCUTANEOUS AS NEEDED
Qty: 0.2 ML | Refills: 1 | Status: SHIPPED | OUTPATIENT
Start: 2022-09-02

## 2022-09-02 RX ORDER — ALPRAZOLAM 0.5 MG/1
0.5 TABLET ORAL 2 TIMES DAILY PRN
Status: DISCONTINUED | OUTPATIENT
Start: 2022-09-02 | End: 2022-09-04 | Stop reason: HOSPADM

## 2022-09-02 RX ADMIN — ALPRAZOLAM 0.5 MG: 0.5 TABLET ORAL at 23:01

## 2022-09-02 RX ADMIN — PANTOPRAZOLE SODIUM 40 MG: 40 TABLET, DELAYED RELEASE ORAL at 08:13

## 2022-09-02 RX ADMIN — ENOXAPARIN SODIUM 30 MG: 30 INJECTION SUBCUTANEOUS at 17:49

## 2022-09-02 RX ADMIN — HYDRALAZINE HYDROCHLORIDE 20 MG: 20 INJECTION INTRAMUSCULAR; INTRAVENOUS at 12:36

## 2022-09-02 RX ADMIN — FERROUS SULFATE TAB EC 324 MG (65 MG FE EQUIVALENT) 324 MG: 324 (65 FE) TABLET DELAYED RESPONSE at 08:13

## 2022-09-02 RX ADMIN — ATORVASTATIN CALCIUM 40 MG: 40 TABLET, FILM COATED ORAL at 08:13

## 2022-09-02 RX ADMIN — AMLODIPINE BESYLATE 10 MG: 5 TABLET ORAL at 08:13

## 2022-09-02 RX ADMIN — ALPRAZOLAM 0.5 MG: 0.5 TABLET ORAL at 14:00

## 2022-09-02 RX ADMIN — HYDROCODONE BITARTRATE AND ACETAMINOPHEN 1 TABLET: 7.5; 325 TABLET ORAL at 23:01

## 2022-09-02 RX ADMIN — LOSARTAN POTASSIUM 100 MG: 50 TABLET, FILM COATED ORAL at 08:13

## 2022-09-02 RX ADMIN — SODIUM BICARBONATE 650 MG TABLET 650 MG: at 17:48

## 2022-09-02 RX ADMIN — SODIUM BICARBONATE 650 MG TABLET 650 MG: at 21:29

## 2022-09-02 RX ADMIN — CEFTRIAXONE 2 G: 2 INJECTION, POWDER, FOR SOLUTION INTRAMUSCULAR; INTRAVENOUS at 17:49

## 2022-09-02 RX ADMIN — INSULIN GLARGINE 18 UNITS: 100 INJECTION, SOLUTION SUBCUTANEOUS at 21:30

## 2022-09-02 RX ADMIN — INSULIN LISPRO 2 UNITS: 100 INJECTION, SOLUTION INTRAVENOUS; SUBCUTANEOUS at 12:36

## 2022-09-02 RX ADMIN — HYDROCODONE BITARTRATE AND ACETAMINOPHEN 1 TABLET: 7.5; 325 TABLET ORAL at 02:26

## 2022-09-02 RX ADMIN — HYDROCODONE BITARTRATE AND ACETAMINOPHEN 1 TABLET: 7.5; 325 TABLET ORAL at 18:27

## 2022-09-02 RX ADMIN — PANTOPRAZOLE SODIUM 40 MG: 40 TABLET, DELAYED RELEASE ORAL at 17:48

## 2022-09-02 RX ADMIN — HYDRALAZINE HYDROCHLORIDE 20 MG: 20 INJECTION INTRAMUSCULAR; INTRAVENOUS at 21:29

## 2022-09-02 RX ADMIN — Medication 10 ML: at 08:14

## 2022-09-02 RX ADMIN — INSULIN LISPRO 4 UNITS: 100 INJECTION, SOLUTION INTRAVENOUS; SUBCUTANEOUS at 17:49

## 2022-09-02 RX ADMIN — Medication 10 ML: at 21:29

## 2022-09-02 RX ADMIN — HYDROCODONE BITARTRATE AND ACETAMINOPHEN 1 TABLET: 7.5; 325 TABLET ORAL at 09:56

## 2022-09-02 RX ADMIN — SODIUM BICARBONATE 650 MG TABLET 650 MG: at 08:13

## 2022-09-02 RX ADMIN — HYDROCODONE BITARTRATE AND ACETAMINOPHEN 1 TABLET: 7.5; 325 TABLET ORAL at 05:48

## 2022-09-02 RX ADMIN — SODIUM CHLORIDE 100 ML/HR: 9 INJECTION, SOLUTION INTRAVENOUS at 18:27

## 2022-09-02 RX ADMIN — SODIUM CHLORIDE 100 ML/HR: 9 INJECTION, SOLUTION INTRAVENOUS at 08:10

## 2022-09-02 RX ADMIN — HYDROCODONE BITARTRATE AND ACETAMINOPHEN 1 TABLET: 7.5; 325 TABLET ORAL at 14:24

## 2022-09-02 NOTE — CONSULTS
Referring Provider: Hospitalist  Reason for Consultation: S/p fall and possible syncope    Patient Care Team:  Landon Smith MD as PCP - General  Landon Smith MD as PCP - Family Medicine  Cesar Ellis MD as Consulting Physician (Cardiology)    Chief complaint s/p fall and possible syncope    Subjective .     History of present illness:  Neeta Harrell is a 76 y.o. female with history of diabetes hypertension hyperlipidemia and history of tachycardia with palpitations presented to the hospital after syncopal episode.  Patient was in a flower shop when she suddenly fell down and broke her ankle in the past and was noted to have hypoglycemia with blood sugars in the 40s and also heart rates in the upper 40s to 50s.  Patient was then brought to the hospital.  She was then admitted and ruled out for MI but no she needs ankle surgery and needs preop evaluation also.  Review of Systems   Constitutional: Negative for fever and malaise/fatigue.   HENT: Negative for ear pain and nosebleeds.    Eyes: Negative for blurred vision and double vision.   Cardiovascular: Positive for syncope. Negative for chest pain, dyspnea on exertion and palpitations.   Respiratory: Negative for cough and shortness of breath.    Skin: Negative for rash.   Musculoskeletal: Negative for joint pain.   Gastrointestinal: Negative for abdominal pain, nausea and vomiting.   Neurological: Negative for focal weakness and headaches.   Psychiatric/Behavioral: Negative for depression. The patient is not nervous/anxious.    All other systems reviewed and are negative.      History  Past Medical History:   Diagnosis Date   • Arthritis    • CKD (chronic kidney disease)    • Diabetes mellitus (HCC)    • Hyperlipidemia    • Hypertension        Past Surgical History:   Procedure Laterality Date   •  SECTION      x2   • CHOLECYSTECTOMY     • HYSTERECTOMY     • INCONTINENCE SURGERY     • TONSILLECTOMY         Family History   Problem  Relation Age of Onset   • Arrhythmia Sister         has pacemaker   • Heart disease Brother         bypass sx       Social History     Tobacco Use   • Smoking status: Never Smoker   • Smokeless tobacco: Never Used   Vaping Use   • Vaping Use: Never used   Substance Use Topics   • Alcohol use: Never   • Drug use: Never        Medications Prior to Admission   Medication Sig Dispense Refill Last Dose   • Accu-Chek Softclix Lancets lancets 1 each 3 (Three) Times a Day. Test BG TID      • alendronate (FOSAMAX) 70 MG tablet Take 1 tablet by mouth Every 7 (Seven) Days. fridays      • amLODIPine (NORVASC) 10 MG tablet Take 10 mg by mouth Daily.      • aspirin 81 MG EC tablet Take 1 tablet by mouth Every Other Day. Took one today Thursday 9/01/2022      • atorvastatin (LIPITOR) 40 MG tablet Take 1 tablet by mouth Daily.      • carvedilol (COREG) 12.5 MG tablet TAKE 1 TABLET BY MOUTH TWICE DAILY WITH MEALS 180 tablet 1    • cholecalciferol (VITAMIN D3) 25 MCG (1000 UT) tablet Take 2,000 Units by mouth 2 (Two) Times a Day.      • ferrous sulfate 324 (65 Fe) MG tablet delayed-release EC tablet Take 324 mg by mouth Daily With Breakfast.      • glucagon (GLUCAGEN) 1 MG injection Inject 1 mg into the appropriate muscle as directed by prescriber 1 (One) Time As Needed.      • glucose blood test strip 1 each by Other route 3 (Three) Times a Day As Needed.      • insulin aspart (NovoLOG FlexPen) 100 UNIT/ML solution pen-injector sc pen Inject 10 Units under the skin into the appropriate area as directed 3 (Three) Times a Day With Meals.      • insulin detemir (Levemir FlexTouch) 100 UNIT/ML injection Inject 26 Units under the skin into the appropriate area as directed Every Night.      • losartan (COZAAR) 100 MG tablet Take 1 tablet by mouth Daily.      • Omega-3 Fatty Acids (fish oil) 1000 MG capsule capsule Take 1,000 mg by mouth 2 (Two) Times a Day With Meals.      • pantoprazole (PROTONIX) 40 MG EC tablet Take 40 mg by mouth 2  "(two) times a day.      • SITagliptin (JANUVIA) 25 MG tablet Take 25 mg by mouth Daily.      • sodium bicarbonate 650 MG tablet Take 650 mg by mouth 3 (Three) Times a Day.      • sodium bicarbonate 650 MG tablet Take 1 tablet by mouth 3 (Three) Times a Day.      • Zinc 50 MG capsule Take 1 tablet by mouth Daily.            Ibuprofen    Scheduled Meds:amLODIPine, 10 mg, Oral, Daily  atorvastatin, 40 mg, Oral, Daily  cefTRIAXone, 2 g, Intravenous, Q24H  enoxaparin, 30 mg, Subcutaneous, Daily  ferrous sulfate, 324 mg, Oral, Daily With Breakfast  insulin glargine, 18 Units, Subcutaneous, Nightly  insulin lispro, 0-7 Units, Subcutaneous, TID AC  losartan, 100 mg, Oral, Daily  pantoprazole, 40 mg, Oral, BID AC  sodium bicarbonate, 650 mg, Oral, TID  sodium chloride, 10 mL, Intravenous, Q12H      Continuous Infusions:sodium chloride, 100 mL/hr, Last Rate: 100 mL/hr (09/02/22 0810)      PRN Meds:.•  acetaminophen **OR** acetaminophen **OR** acetaminophen  •  ALPRAZolam  •  dextrose  •  dextrose  •  glucagon (human recombinant)  •  hydrALAZINE  •  HYDROcodone-acetaminophen  •  ondansetron **OR** ondansetron  •  [COMPLETED] Insert peripheral IV **AND** sodium chloride  •  sodium chloride    Objective     VITAL SIGNS  Vitals:    09/02/22 0203 09/02/22 0847 09/02/22 0957 09/02/22 1130   BP: 167/69  175/69 170/72   BP Location: Right arm  Left arm Left arm   Patient Position: Lying  Lying Lying   Pulse: 60  57 60   Resp: 18  16 16   Temp: 97.5 °F (36.4 °C)   97.8 °F (36.6 °C)   TempSrc: Oral   Oral   SpO2: 96%  98% 95%   Weight:  90.3 kg (199 lb)     Height:  165.1 cm (65\")         Flowsheet Rows    Flowsheet Row First Filed Value   Admission Height 165.1 cm (65\") Documented at 09/01/2022 1409   Admission Weight 88.9 kg (196 lb) Documented at 09/01/2022 1409           TELEMETRY: Sinus bradycardia    Physical Exam:  Constitutional:       Appearance: Well-developed.   Eyes:      General: No scleral icterus.     Conjunctiva/sclera: " Conjunctivae normal.   HENT:      Head: Normocephalic and atraumatic.   Neck:      Vascular: No carotid bruit or JVD.   Pulmonary:      Effort: Pulmonary effort is normal.      Breath sounds: Normal breath sounds. No wheezing. No rales.   Cardiovascular:      Normal rate. Regular rhythm.   Pulses:     Intact distal pulses.   Abdominal:      General: Bowel sounds are normal.      Palpations: Abdomen is soft.   Musculoskeletal:      Cervical back: Normal range of motion and neck supple. Skin:     General: Skin is warm and dry.      Findings: No rash.   Neurological:      Mental Status: Alert.          Results Review:   I reviewed the patient's new clinical results.  Lab Results (last 24 hours)     Procedure Component Value Units Date/Time    POC Glucose Once [[id number]]  (Abnormal) Collected: 09/02/22 1128    Specimen: Blood Updated: 09/02/22 1129     Glucose 183 mg/dL      Comment: Serial Number: [id number]:  [id number]       POC Glucose Once [[id number]]  (Abnormal) Collected: 09/02/22 0722    Specimen: Blood Updated: 09/02/22 0722     Glucose 119 mg/dL      Comment: Serial Number: [id number]:  [id number]       Basic Metabolic Panel [[id number]]  (Abnormal) Collected: 09/02/22 0327    Specimen: Blood Updated: 09/02/22 0423     Glucose 157 mg/dL      BUN 36 mg/dL      Creatinine 1.74 mg/dL      Sodium 137 mmol/L      Potassium 4.7 mmol/L      Chloride 102 mmol/L      CO2 25.0 mmol/L      Calcium 8.9 mg/dL      BUN/Creatinine Ratio 20.7     Anion Gap 10.0 mmol/L      eGFR 30.5 mL/min/1.73      Comment: National Kidney Foundation and American Society of Nephrology (ASN) Task Force recommended calculation based on the Chronic Kidney Disease Epidemiology Collaboration (CKD-EPI) equation refit without adjustment for race.       Narrative:      GFR Normal >60  Chronic Kidney Disease <60  Kidney Failure <15      Troponin [[id number]]  (Normal) Collected: 09/02/22 0327    Specimen: Blood Updated: 09/02/22 0423      Troponin T <0.010 ng/mL     Narrative:      Troponin T Reference Range:  <= 0.03 ng/mL-   Negative for AMI  >0.03 ng/mL-     Abnormal for myocardial necrosis.  Clinicians would have to utilize clinical acumen, EKG, Troponin and serial changes to determine if it is an Acute Myocardial Infarction or myocardial injury due to an underlying chronic condition.       Results may be falsely decreased if patient taking Biotin.      Lactic Acid, Plasma [[id number]]  (Normal) Collected: 09/02/22 0327    Specimen: Blood Updated: 09/02/22 0422     Lactate 1.0 mmol/L     CBC Auto Differential [[id number]]  (Abnormal) Collected: 09/02/22 0327    Specimen: Blood Updated: 09/02/22 0402     WBC 9.50 10*3/mm3      RBC 3.69 10*6/mm3      Hemoglobin 11.2 g/dL      Hematocrit 33.7 %      MCV 91.3 fL      MCH 30.4 pg      MCHC 33.3 g/dL      RDW 13.3 %      RDW-SD 42.4 fl      MPV 7.8 fL      Platelets 261 10*3/mm3      Neutrophil % 65.8 %      Lymphocyte % 22.9 %      Monocyte % 10.5 %      Eosinophil % 0.5 %      Basophil % 0.3 %      Neutrophils, Absolute 6.30 10*3/mm3      Lymphocytes, Absolute 2.20 10*3/mm3      Monocytes, Absolute 1.00 10*3/mm3      Eosinophils, Absolute 0.00 10*3/mm3      Basophils, Absolute 0.00 10*3/mm3      nRBC 0.1 /100 WBC     POC Glucose Once [[id number]]  (Abnormal) Collected: 09/02/22 0204    Specimen: Blood Updated: 09/02/22 0205     Glucose 173 mg/dL      Comment: Serial Number: [id number]:  [id number]       POC Glucose Once [[id number]]  (Abnormal) Collected: 09/01/22 2030    Specimen: Blood Updated: 09/01/22 2031     Glucose 197 mg/dL      Comment: Serial Number: [id number]:  [id number]       Blood Culture - Blood, Arm, Left [[id number]] Collected: 09/01/22 1848    Specimen: Blood from Arm, Left Updated: 09/01/22 1852    Blood Culture - Blood, Arm, Right [[id number]] Collected: 09/01/22 1848    Specimen: Blood from Arm, Right Updated: 09/01/22 1852    Troponin [[id number]]  (Normal) Collected:  09/01/22 1606    Specimen: Blood Updated: 09/01/22 1716     Troponin T <0.010 ng/mL     Narrative:      Troponin T Reference Range:  <= 0.03 ng/mL-   Negative for AMI  >0.03 ng/mL-     Abnormal for myocardial necrosis.  Clinicians would have to utilize clinical acumen, EKG, Troponin and serial changes to determine if it is an Acute Myocardial Infarction or myocardial injury due to an underlying chronic condition.       Results may be falsely decreased if patient taking Biotin.      Urinalysis, Microscopic Only - Urine, Clean Catch [[id number]]  (Abnormal) Collected: 09/01/22 1643    Specimen: Urine, Clean Catch Updated: 09/01/22 1713     RBC, UA None Seen /HPF      WBC, UA Too Numerous to Count /HPF      Bacteria, UA 4+ /HPF      Squamous Epithelial Cells, UA None Seen /HPF      Hyaline Casts, UA None Seen /LPF      Methodology Manual Light Microscopy    Urinalysis With Microscopic If Indicated (No Culture) - Urine, Clean Catch [[id number]]  (Abnormal) Collected: 09/01/22 1643    Specimen: Urine, Clean Catch Updated: 09/01/22 1702     Color, UA Yellow     Appearance, UA Turbid     pH, UA 6.0     Specific Gravity, UA 1.015     Glucose, UA Negative     Ketones, UA Trace     Bilirubin, UA Negative     Blood, UA Trace     Protein, UA 30 mg/dL (1+)     Leuk Esterase, UA Large (3+)     Nitrite, UA Negative     Urobilinogen, UA 0.2 E.U./dL    Manual Differential [[id number]]  (Abnormal) Collected: 09/01/22 1606    Specimen: Blood Updated: 09/01/22 1651     Neutrophil % 77.0 %      Lymphocyte % 13.0 %      Monocyte % 5.0 %      Eosinophil % 1.0 %      Bands %  3.0 %      Metamyelocyte % 1.0 %      Neutrophils Absolute 10.80 10*3/mm3      Lymphocytes Absolute 1.76 10*3/mm3      Monocytes Absolute 0.68 10*3/mm3      Eosinophils Absolute 0.14 10*3/mm3      RBC Morphology Normal     Toxic Granulation Slight/1+     Platelet Morphology Normal    CBC & Differential [[id number]]  (Abnormal) Collected: 09/01/22 1606    Specimen: Blood  Updated: 09/01/22 1651    Narrative:      The following orders were created for panel order CBC & Differential.  Procedure                               Abnormality         Status                     ---------                               -----------         ------                     CBC Auto Differential[[id number]]        Abnormal            Final result               Scan Slide[[id number]]                                       Final result                 Please view results for these tests on the individual orders.    CBC Auto Differential [[id number]]  (Abnormal) Collected: 09/01/22 1606    Specimen: Blood Updated: 09/01/22 1651     WBC 13.50 10*3/mm3      RBC 3.99 10*6/mm3      Hemoglobin 11.9 g/dL      Hematocrit 36.7 %      MCV 91.8 fL      MCH 29.7 pg      MCHC 32.4 g/dL      RDW 13.5 %      RDW-SD 42.9 fl      MPV 7.1 fL      Platelets 294 10*3/mm3     Scan Slide [[id number]] Collected: 09/01/22 1606    Specimen: Blood Updated: 09/01/22 1651     Scan Slide --     Comment: See Manual Differential Results       Comprehensive Metabolic Panel [[id number]]  (Abnormal) Collected: 09/01/22 1606    Specimen: Blood Updated: 09/01/22 1639     Glucose 128 mg/dL      BUN 39 mg/dL      Creatinine 2.01 mg/dL      Sodium 138 mmol/L      Potassium 4.6 mmol/L      Chloride 101 mmol/L      CO2 26.0 mmol/L      Calcium 9.5 mg/dL      Total Protein 7.3 g/dL      Albumin 4.20 g/dL      ALT (SGPT) 15 U/L      AST (SGOT) 17 U/L      Alkaline Phosphatase 30 U/L      Total Bilirubin 0.6 mg/dL      Globulin 3.1 gm/dL      A/G Ratio 1.4 g/dL      BUN/Creatinine Ratio 19.4     Anion Gap 11.0 mmol/L      eGFR 25.6 mL/min/1.73      Comment: National Kidney Foundation and American Society of Nephrology (ASN) Task Force recommended calculation based on the Chronic Kidney Disease Epidemiology Collaboration (CKD-EPI) equation refit without adjustment for race.       Narrative:      GFR Normal >60  Chronic Kidney Disease <60  Kidney Failure  <15      Magnesium [[id number]]  (Normal) Collected: 09/01/22 1606    Specimen: Blood Updated: 09/01/22 1639     Magnesium 1.7 mg/dL     Protime-INR [[id number]]  (Normal) Collected: 09/01/22 1606    Specimen: Blood Updated: 09/01/22 1624     Protime 10.6 Seconds      INR 1.03    aPTT [[id number]]  (Abnormal) Collected: 09/01/22 1606    Specimen: Blood Updated: 09/01/22 1624     PTT 23.4 seconds     POC Glucose Once [[id number]]  (Abnormal) Collected: 09/01/22 1613    Specimen: Blood Updated: 09/01/22 1614     Glucose 127 mg/dL      Comment: Serial Number: [id number]:  [id number]             Imaging Results (Last 24 Hours)     Procedure Component Value Units Date/Time    CT Cervical Spine Without Contrast [[id number]] Collected: 09/01/22 1611     Updated: 09/01/22 1628    Narrative:      Examination: CT CERVICAL SPINE WO CONTRAST-     Date of Exam: 9/1/2022 3:54 PM     Indication: Neck trauma (Age >= 65y).     Comparison: None available.     Technique: Axial volumetric noncontrast CT imaging of the cervical spine  was performed. Sagittal and coronal reconstructions were created for  interpretation. Automated exposure control and iterative reconstruction  methods were used.     Findings:  The cervical spine is maintained and alignment. There is minimal  anterolisthesis of C7 on T1 measuring 2 mm. Negative for cervical spine  fracture. The posterior spinous processes are intact. There is no locked  or perched facet. The dens is intact. Lateral masses of C1 are normally  aligned on C2. Lung apices without pneumothorax.     C2-3: There is no canal stenosis or foraminal stenosis.     C3-4: There is a small posterior central disc osteophyte complex. There  is bilateral uncovertebral spurring contributing to mild bilateral  foramina stenosis. Asymmetric right facet arthritis.     C4-5: There is a posterior disc osteophyte complex with bilateral  uncovertebral spurring right greater than left. Moderate to severe  right  foraminal stenosis. No left foraminal stenosis.     C5-6: There is a posterior disc osteophyte complex with bilateral  uncovertebral spurring. Moderate to severe right foraminal stenosis.  Mild to moderate left foraminal stenosis. Mild central canal stenosis.     C6-7: There is a posterior central disc osteophyte complex. There is  bilateral uncovertebral spurring contributing to moderate right  foraminal stenosis. Mild to moderate left foraminal stenosis.     C7-T1: There is asymmetric right-sided facet arthritis. Negative for  canal stenosis. Mild bilateral foraminal stenosis.       Impression:      1. Negative for cervical spine fracture.  2. Multilevel cervical degenerative findings above.     Electronically Signed By-Tong Lozoya MD On:9/1/2022 4:26 PM  This report was finalized on [id number] by  Tong Lozoya MD.    CT Head Without Contrast [[id number]] Collected: 09/01/22 1606     Updated: 09/01/22 1626    Narrative:         DATE OF EXAM:  9/1/2022 3:54 PM     PROCEDURE:   CT HEAD WO CONTRAST-     INDICATIONS:   trauma     COMPARISON:  CT head without contrast 04/19/2019     TECHNIQUE:   Routine transaxial cuts were obtained through the head without the  administration of contrast. Automated exposure control and iterative  reconstruction methods were used.      FINDINGS:  There is no intracranial hemorrhage. Negative for mass effect or midline  shift. Mild cerebral atrophy. Mild patchy areas of white matter  hypodensity suggesting changes of chronic microvascular disease.  Posterior fossa without acute abnormality. The globes are symmetric. No  retro-orbital abnormality. Thinning of the lens noted bilaterally.  Mastoid air cells well-aerated. Negative for calvarial fracture.        Impression:      1. No intracranial hemorrhage or acute intracranial abnormality.  2. Mild white matter findings suggesting chronic microvascular disease.     Electronically Signed By-Tong Lozoya MD On:9/1/2022 4:11  PM  This report was finalized on [id number] by  Tong Lozoya MD.    XR Ankle 3+ View Left [[id number]] Collected: 09/01/22 1553     Updated: 09/01/22 1556    Narrative:      DATE OF EXAM:  9/1/2022 3:43 PM     PROCEDURE:  XR ANKLE 3+ VW LEFT-     INDICATIONS:  trauma fall. Pain.     COMPARISON:  No Comparisons Available     FINDINGS:  Soft tissue swelling. Osteopenia. Displaced fracture of the medial  malleolus with distal fracture fragment inferiorly displaced by 6 mm.  Oblique minimally displaced fracture distal fibula. Distal fracture  fragment is displaced lateral to the proximal fracture fragment by 2 mm.  Ankle mortise appears disrupted with widening medially and narrowing  laterally. Posterior malleoli or fracture fragment is seen without  displacement. Mild subtalar joint space narrowing. Large plantar  calcaneal spur. Small enthesophyte Achilles tendon insertion.       Impression:      Trimalleolar fracture. Mild displacement medial malleolus and distal  fibula. Posterior malleoli or fracture fragment is not significantly  displaced. Ankle mortise appears disrupted.     Electronically Signed By-Randi Stanford MD On:9/1/2022 3:54 PM  This report was finalized on [id number] by  Randi Stanford MD.    XR Chest 1 View [[id number]] Collected: 09/01/22 1552     Updated: 09/01/22 1555    Narrative:      DATE OF EXAM:  9/1/2022 3:42 PM     PROCEDURE:  XR CHEST 1 VW-     INDICATIONS:  weakness  fall.     COMPARISON:  No Comparisons Available     TECHNIQUE:   Single radiographic view of the chest was obtained.     FINDINGS:  Lungs are normally expanded. Mild cardiomegaly. No pneumothorax or  pleural effusion or focal pulmonary parenchymal opacity. Pulmonary  vessels are distinct. Bones and soft tissues are normal.       Impression:      No acute cardiopulmonary abnormality.     Electronically Signed By-Randi Stanford MD On:9/1/2022 3:53 PM  This report was finalized on [id number] by  Randi Stanford MD.           EKG      I personally viewed and interpreted the patient's EKG/Telemetry data:    ECHOCARDIOGRAM:      STRESS MYOVIEW:    CARDIAC CATHETERIZATION:    OTHER:         Assessment & Plan     Principal Problem:    Syncope  Active Problems:    Hypoglycemia    Type 2 diabetes mellitus with diabetic nephropathy (HCC)    Bradycardia    Closed trimalleolar fracture of left ankle    Hyperlipidemia    GERD without esophagitis    Acute UTI (urinary tract infection)    Closed trimalleolar fracture of left ankle, initial encounter      Patient presented after syncopal episode and was probably secondary to hypoglycemia  Patient is also on the monitor and will rule out for any tacky or bradycardia arrhythmias  Patient is on carvedilol for history of atrial arrhythmias and I will decrease the dose to 6.25 from 12.5 twice daily because she has bradycardia at this time  Patient also had an echocardiogram today which showed normal LV function  Patient is cleared for ankle surgery and anesthesia with low risk  We will monitor her heart rate and rhythm perioperatively  Patient's sugar levels will also be monitored carefully and she is getting diabetic education to avoid hypoglycemia in future.    I discussed the patients findings and my recommendations with patient and nurse    Cesar Ellis MD  09/02/22  12:57 EDT

## 2022-09-02 NOTE — H&P
[facility] Medicine Services      Patient Name: Neeta Harrell  : 1945  MRN: [id number]  Primary Care Physician:  Landon Smith MD  Date of admission: 2022      Subjective      Chief Complaint: Syncope with left lower extremity injury    History of Present Illness: Neeta Harrell is a 76 y.o. female who presented to [facility] on 2022 complaining of syncope with left lower extremity injury during hypoglycemia crisis.  The patient has been under a lot of stress as her brother passed away yesterday.  She is tearful when discussing this at time of interview.  The patient and her sister were at the floral shop picking out flower arrangements for the  which is to be held on Monday when her continuous glucose monitor started reading that her blood sugar was rapidly dropping.  The patient had glucose tablets but opted for a Snickers instead and while she was eating it she suddenly lost consciousness and collapsed with brief loss of consciousness.  The patient fell on concrete floor and subsequently had significant left ankle pain.  The patient reports that her blood sugar is usually well controlled with few lows and believes the stress of losing her brother may have contributed to the low.  The patient denies recent subjective fever, chills, cough, chest pain, dyspnea with exertion, dysuria, or other constitutional symptoms.    In the emergency room the patient was noted to have trimalleolar fracture.  EKG was noted to show sinus bradycardia with rate 43.  Patient had also noted to be bradycardic per EMS.  The patient was also noted to have turbulent urine with WBCs too many to count and 4+ bacteria.        Review of Systems   Constitutional: Positive for decreased appetite. Negative for chills and fever.   Cardiovascular: Positive for syncope. Negative for chest pain, dyspnea on exertion and palpitations.   Respiratory: Negative for cough and  shortness of breath.    Gastrointestinal: Positive for nausea. Negative for abdominal pain and vomiting.   Genitourinary: Negative for dysuria.   All other systems reviewed and are negative.       Personal History     Past Medical History:   Diagnosis Date   • Arthritis    • CKD (chronic kidney disease)    • Diabetes mellitus (HCC)    • Hyperlipidemia    • Hypertension        Past Surgical History:   Procedure Laterality Date   •  SECTION      x2   • CHOLECYSTECTOMY     • HYSTERECTOMY     • INCONTINENCE SURGERY     • TONSILLECTOMY         Family History: family history includes Arrhythmia in her sister; Heart disease in her brother. Otherwise pertinent FHx was reviewed and not pertinent to current issue.    Social History:  reports that she has never smoked. She has never used smokeless tobacco. She reports that she does not drink alcohol and does not use drugs.    Home Medications:  Prior to Admission Medications     Prescriptions Last Dose Informant Patient Reported? Taking?    Accu-Chek Softclix Lancets lancets   Yes No    1 each 3 (Three) Times a Day. Test BG TID    alendronate (FOSAMAX) 70 MG tablet   Yes No    Take 1 tablet by mouth Daily.    amLODIPine (NORVASC) 10 MG tablet   Yes No    Take 10 mg by mouth Daily.    aspirin 81 MG EC tablet   Yes No    Take 1 tablet by mouth Every Other Day.    atorvastatin (LIPITOR) 40 MG tablet   Yes No    Take 1 tablet by mouth Daily.    carvedilol (COREG) 12.5 MG tablet   No No    TAKE 1 TABLET BY MOUTH TWICE DAILY WITH MEALS    cholecalciferol (VITAMIN D3) 25 MCG (1000 UT) tablet   Yes No    Take 2,000 Units by mouth Daily.    Cholecalciferol 50 MCG (2000 UT) tablet   Yes No    Take 1 tablet by mouth Daily.    ferrous sulfate 324 (65 Fe) MG tablet delayed-release EC tablet   Yes No    Take 324 mg by mouth Daily With Breakfast.    glucagon (GLUCAGEN) 1 MG injection   Yes No    Inject 1 mg into the appropriate muscle as directed by prescriber 1 (One) Time As Needed.     glucose blood test strip   Yes No    1 each by Other route 3 (Three) Times a Day As Needed.    insulin aspart (NovoLOG FlexPen) 100 UNIT/ML solution pen-injector sc pen   Yes No    Inject 10 Units under the skin into the appropriate area as directed 3 (Three) Times a Day With Meals.    insulin detemir (Levemir FlexTouch) 100 UNIT/ML injection   Yes No    Inject 18 Units under the skin into the appropriate area as directed Every Night.    losartan (COZAAR) 100 MG tablet   Yes No    Take 1 tablet by mouth Daily.    Omega-3 Fatty Acids (fish oil) 1000 MG capsule capsule   Yes No    Take 1,000 mg by mouth 2 (Two) Times a Day With Meals.    pantoprazole (PROTONIX) 40 MG EC tablet   Yes No    Take 40 mg by mouth 2 (two) times a day.    SITagliptin (JANUVIA) 25 MG tablet   Yes No    Take 25 mg by mouth Daily.    sodium bicarbonate 650 MG tablet   Yes No    Take 650 mg by mouth 3 (Three) Times a Day.    sodium bicarbonate 650 MG tablet   Yes No    Take 1 tablet by mouth 3 (Three) Times a Day.    vitamin E 1000 UNIT capsule   Yes No    Take 1,000 Units by mouth Daily.    Zinc 50 MG capsule   Yes No    Take 1 tablet by mouth Daily.            Allergies:  Allergies   Allergen Reactions   • Ibuprofen Other (See Comments) and Nausea And Vomiting     Kidney and liver problems       Objective      Vitals:   Temp:  [97.4 °F (36.3 °C)-97.6 °F (36.4 °C)] 97.6 °F (36.4 °C)  Heart Rate:  [44-62] 61  Resp:  [15-18] 18  BP: (121-163)/(52-76) 161/76    Physical Exam  Vitals and nursing note reviewed.   Constitutional:       Appearance: Normal appearance. She is not ill-appearing.   HENT:      Head: Normocephalic and atraumatic.      Right Ear: External ear normal.      Left Ear: External ear normal.      Nose: Nose normal.      Mouth/Throat:      Mouth: Mucous membranes are moist.   Eyes:      General: No scleral icterus.        Right eye: No discharge.         Left eye: No discharge.      Extraocular Movements: Extraocular movements  intact.      Conjunctiva/sclera: Conjunctivae normal.      Pupils: Pupils are equal, round, and reactive to light.   Cardiovascular:      Rate and Rhythm: Normal rate and regular rhythm.      Pulses: Normal pulses.      Heart sounds: Normal heart sounds. No murmur heard.  Pulmonary:      Breath sounds: Normal breath sounds.   Abdominal:      General: Bowel sounds are normal.      Palpations: Abdomen is soft.   Musculoskeletal:         General: Signs of injury present. Normal range of motion.      Cervical back: Normal range of motion and neck supple.      Right lower leg: No edema.      Left lower leg: Edema present.      Comments: Splint in place with good cap refill of the exposed toes   Skin:     General: Skin is warm and dry.      Capillary Refill: Capillary refill takes less than 2 seconds.   Neurological:      General: No focal deficit present.      Mental Status: She is alert and oriented to person, place, and time.   Psychiatric:         Mood and Affect: Mood normal.         Behavior: Behavior normal.         Thought Content: Thought content normal.         Judgment: Judgment normal.          Result Review    Result Review:  I have personally reviewed the results from the time of this admission to 9/1/2022 20:56 EDT and agree with these findings:  [x]  Laboratory  [x]  Microbiology  [x]  Radiology  [x]  EKG/Telemetry   [x]  Cardiology/Vascular   []  Pathology  [x]  Old records  []  Other:  Most notable findings include:     Assessment & Plan        Active Hospital Problems:  Active Hospital Problems    Diagnosis    • **Syncope    • Hypoglycemia    • Bradycardia    • Closed trimalleolar fracture of left ankle    • Type 2 diabetes mellitus with diabetic nephropathy (HCC)    • GERD without esophagitis    • Hyperlipidemia    • Acute UTI (urinary tract infection)      Plan:     Bradycardia with syncope and colapse--likely multifactorial with hypoglycemia, nausea, and chronic beta-blocker: Hold Coreg; cardiology  consult; echocardiogram; serial troponin  -No chest pain  -Hx of tachy arrhythmia; followed by Dr. Ellis  --Of note, the patient is adamant that she is not going to miss her brother's  and wants to be out of the hospital by  even if that will delay her ankle surgery    Acute hypoglycemia--likely secondary to loss of appetite due to emotional stress; consideration for UTI: Hold oral hypoglycemic medication; decrease Lantus dose; check blood glucose at 2 AM    UTI, asymptomatic bacteriuria and elevated WBC in a diabetic patient: IV Rocephin x4 days    Trimalleolar fracture status post fall: Orthopedic consult  -Of note, the patient is adamant that she is not going to miss her brother's  and wants to be out of the hospital by  even if that will delay her ankle surgery    Diabetes type 2, chronic hold mealtime insulin; continue Levemir with dose decreased to 70% of home dose; hold Januvia    Osteoporosis, chronic: Hold vitamin D; hold Fosamax    CKD stage IV, chronic, creatinine 2.01 which is at baseline: Continue sodium bicarb    Hypertension, chronic with mildly elevated blood pressure with cardiovascular prophylaxis: Continue Norvasc; hold Coreg; hold aspirin; continue Cozaar    HLD, chronic: Continue Lipitor    Anemia, chronic: Continue ferrous sulfate    GERD, chronic: Continue Protonix    Anemia, mild, chronic, hemoglobin 11.9--likely secondary to stage IV CKD: Repeat CBC in a.m.    DVT prophylaxis:  Medical DVT prophylaxis orders are present.    CODE STATUS:       Admission Status:  I believe this patient meets observation status.    I discussed the patient's findings and my recommendations with patient, family and nursing staff.    This patient has been examined wearing appropriate Personal Protective Equipment. 22      Signature: Electronically signed by DEREJE Phillips, 22, 10:27 PM EDT.

## 2022-09-02 NOTE — PLAN OF CARE
Goal Outcome Evaluation:  Plan of Care Reviewed With: patient, daughter        Progress: improving  Outcome Evaluation: Pt received pain meds per request every 4 hours, reported effective management of pain. Received prn for anxiety. Leg elevated on pillow. checked by Dr Galarza, will have surgery tomorrow.

## 2022-09-02 NOTE — CONSULTS
"Diabetes Education  Assessment/Teaching    Patient Name:  Neeta Harrell  YOB: 1945  MRN: [id number]  Admit Date:  2022      Assessment Date:  2022  Flowsheet Row Most Recent Value   General Information     Referral From: MD order  [Patient requested to see an educator]   Height 165.1 cm (65\")   Height Method Stated   Weight 90.3 kg (199 lb)   Weight Method Bed scale   Pregnancy Assessment    Diabetes History    Education Preferences    Nutrition Information    Assessment Topics    DM Goals           Flowsheet Row Most Recent Value   DM Education Needs    Meter Has own   Meter Type --  [Dejah]   Blood Glucose Target Range    Medication Insulin, Pen, Administration, Oral  [Patient states she takes her insulin and Januvia as MD orders.  Daughter Jaylene at bedside and wanted to learn the insulin pen. Educated her on the use of the Humalog pen. She was able to correctly prime pen, dial up dose and inject into a demo sponge.]   Problem Solving Hypoglycemia, Hyperglycemia, Signs, Symptoms, Treatment  [Patient had a low blood sugar yesterday. She wanted review on how to correctly treat. Discussed the 15:15 rule. She has Glucagon at home, but probably  and her  doesn't know how to use. Taught her and daughter how to use Gvoke pen.]   Motivation Engaged   Teaching Method Discussion, Demonstration, Handouts, Teach back   Patient Response Verbalized understanding, Demonstrates adequately            Other Comments:  Patient seen per MD order per patient's request. Patient agreed to education with her daughter Jaylene in the room.  Patient states she had a low blood sugar and wanted to review how to treat it.  She states her Dejah went off and she took a couple bites of a snickers bar. She states she ended up passing out. Discussed the rules of 15. Discussed hyper and hypoglycemia.  Patient has Glucagon at home but it is  and her  doesn't know how to use it.  " Discussed Gvoke and educated her and her daughter on how to use it.  They both felt like her  would be able to use this verses the vial/syringe glucagon.  Also educated daughter on how to use the insulin pen.         Electronically signed by:  Cassidy Ng RN  09/02/22 14:42 EDT

## 2022-09-02 NOTE — CASE MANAGEMENT/SOCIAL WORK
Discharge Planning Assessment   [facility]     Patient Name: Neeta Harrell  MRN: [id number]  Today's Date: 9/2/2022    Admit Date: 9/1/2022     Discharge Needs Assessment     Row Name 09/02/22 1101       Living Environment    People in Home spouse    Name(s) of People in Home Spouse- Tyrell    Current Living Arrangements home    Primary Care Provided by self    Provides Primary Care For no one    Family Caregiver if Needed spouse    Family Caregiver Names Tyrell    Quality of Family Relationships supportive;involved;helpful    Able to Return to Prior Arrangements yes       Resource/Environmental Concerns    Resource/Environmental Concerns none    Transportation Concerns none       Transition Planning    Patient/Family Anticipates Transition to home with family    Patient/Family Anticipated Services at Transition none    Transportation Anticipated family or friend will provide       Discharge Needs Assessment    Readmission Within the Last 30 Days no previous admission in last 30 days    Equipment Currently Used at Home none    Concerns to be Addressed discharge planning    Anticipated Changes Related to Illness none    Equipment Needed After Discharge none               Discharge Plan     Row Name 09/02/22 1102       Plan    Plan DC Plan: Pending clinical course. From Home with spouse    Plan Comments The patient reports that she lives with her spouse. PCP and Pharmacy verified. She denies any current DME/HH/PT. She reports that she is IADL's at home. She denies any discharge needs or transportation issues. DC Barriers: ORIF left ankle planned 9/3           Expected Discharge Date and Time     Expected Discharge Date Expected Discharge Time    Sep 5, 2022          Demographic Summary     Row Name 09/02/22 1100       General Information    Admission Type observation    Arrived From emergency department    Referral Source admission list    Reason for Consult discharge planning    Preferred Language English        Contact Information    Permission Granted to Share Info With                Functional Status     Row Name 09/02/22 1100       Functional Status    Usual Activity Tolerance good    Current Activity Tolerance moderate       Functional Status, IADL    Medications independent    Meal Preparation independent    Housekeeping independent    Laundry independent    Shopping independent       Mental Status    General Appearance WDL WDL       Mental Status Summary    Recent Changes in Mental Status/Cognitive Functioning no changes         Met with patient in room wearing PPE: mask/googles    Maintained distance greater than 6 feet and spent less than 15 minutes in the room.    Charlene Rosario RN     Office Phone: [phone]  Office Cell: [phone]

## 2022-09-02 NOTE — CASE MANAGEMENT/SOCIAL WORK
Social Work Assessment  [facility]     Patient Name: Neeta Harrell  MRN: [id number]  Today's Date: 9/2/2022    Admit Date: 9/1/2022     Discharge Needs Assessment     Row Name 09/02/22 1606       Discharge Needs Assessment    Concerns to be Addressed no discharge needs identified    Concerns Comments SW met w/ patient at bedside in room 229. Patient was sitting in bed w/ daughter present. SW asked to speak in front of visitor patient said yes. SW introduced self and reason for consult: Advanced Care Planning. Patient states that she is  and her spouse and there only daughter will be able to make decisions. Patient stated that she doesn’t feel like she needs a living will or healthcare representative at this time and thanked .           Met with patient in room wearing PPE: mask, face shield/goggles, gloves, gown.      Maintained distance greater than six feet and spent less than 15 minutes in the room.        Candelaria Fournier,  [facility] [place]        [phone] office  [phone] fax  [email or web address]

## 2022-09-02 NOTE — SIGNIFICANT NOTE
09/02/22 0914   OTHER   Discipline physical therapist   Rehab Time/Intention   Session Not Performed other (see comments)  (Pt scheduled for ORIF left ankle tomorrow and will eval for PT postoperatively)   Therapy Assessment/Plan (PT)   Criteria for Skilled Interventions Met (PT) yes   Recommendation   PT - Next Appointment 09/03/22

## 2022-09-02 NOTE — PROGRESS NOTES
[facility] Medicine Services Daily Progress Note    Patient Name: Neeta Harrell  : 1945  MRN: [id number]  Primary Care Physician:  Landon Smith MD  Date of admission: 2022      Subjective      Chief Complaint: Leg pain      Patient Reports     22: Left leg pain.  Will order Xanax for anxiety.    Review of Systems   All other systems reviewed and are negative.        Objective      Vitals:   Temp:  [97.4 °F (36.3 °C)-98.1 °F (36.7 °C)] 97.5 °F (36.4 °C)  Heart Rate:  [44-62] 57  Resp:  [15-18] 16  BP: (121-175)/(52-76) 175/69    Physical Exam  HENT:      Head: Normocephalic.      Nose: Nose normal.   Eyes:      Extraocular Movements: Extraocular movements intact.      Pupils: Pupils are equal, round, and reactive to light.   Cardiovascular:      Rate and Rhythm: Normal rate and regular rhythm.   Pulmonary:      Effort: Pulmonary effort is normal.   Abdominal:      General: Bowel sounds are normal.   Musculoskeletal:      Cervical back: Normal range of motion.      Comments: Decreased range of motion left leg   Skin:     General: Skin is warm.   Neurological:      Mental Status: She is alert. Mental status is at baseline.   Psychiatric:         Mood and Affect: Mood normal.             Result Review    Result Review:  I have personally reviewed the results from the time of this admission to 2022 11:17 EDT and agree with these findings:  [x]  Laboratory  []  Microbiology  [x]  Radiology  []  EKG/Telemetry   []  Cardiology/Vascular   []  Pathology  [x]  Old records  []  Other:            Assessment & Plan      Brief Patient Summary:      amLODIPine, 10 mg, Oral, Daily  atorvastatin, 40 mg, Oral, Daily  cefTRIAXone, 2 g, Intravenous, Q24H  enoxaparin, 30 mg, Subcutaneous, Daily  ferrous sulfate, 324 mg, Oral, Daily With Breakfast  insulin glargine, 18 Units, Subcutaneous, Nightly  insulin lispro, 0-7 Units, Subcutaneous, TID AC  losartan, 100 mg, Oral,  Daily  pantoprazole, 40 mg, Oral, BID AC  sodium bicarbonate, 650 mg, Oral, TID  sodium chloride, 10 mL, Intravenous, Q12H       sodium chloride, 100 mL/hr, Last Rate: 100 mL/hr (09/02/22 0810)         Active Hospital Problems:  Active Hospital Problems    Diagnosis    • **Syncope    • Closed trimalleolar fracture of left ankle, initial encounter    • Hypoglycemia    • Bradycardia    • Closed trimalleolar fracture of left ankle    • Hyperlipidemia    • GERD without esophagitis    • Acute UTI (urinary tract infection)    • Type 2 diabetes mellitus with diabetic nephropathy (HCC)      Syncope due to hypoglycemia complicated with trimalleolar fracture:  -s/p CT head /cervical spine in ED  -Orthopedic surgery consulted--> plan for repair 9/3/2022    UTI:  -Continue Rocephin    Anxiety:  -Xanax PRN    Type 2 diabetes mellitus:  -Continue Lantus at reduced dose and ISS  -Hold home Januvia    CKD stage IV:  -Nephrotoxin holds  -Continue sodium bicarb    Hypertension:  -Continue home Norvasc, Coreg and Cozaar when blood pressure appropriate    Hyperlipidemia:  -Continue Lipitor    GERD:  -Protonix    Normocytic anemia:  -Monitor H&H        DVT prophylaxis:  Medical DVT prophylaxis orders are present.    CODE STATUS:    Level Of Support Discussed With: Patient  Code Status (Patient has no pulse and is not breathing): CPR (Attempt to Resuscitate)  Medical Interventions (Patient has pulse or is breathing): Full Support      Disposition:  I expect patient to be discharged defer.    This patient has been examined wearing appropriate Personal Protective Equipment and discussed with nursing. 09/02/22      Electronically signed by Elder Matamoros DO, 09/02/22, 11:17 EDT.  [facility] Hospitalist Team

## 2022-09-02 NOTE — CONSULTS
Referring Provider: Dr. Pascual Caraballo  Reason for Consultation: Left trimalleolar ankle fracture    Patient Care Team:  Landon Smith MD as PCP - General  Landon Smith MD as PCP - Family Medicine  Cesar Ellis MD as Consulting Physician (Cardiology)      Subjective .     History of present illness:  Neeta Harrell is a 76 y.o. female who presents with left trimalleolar ankle fracture sustained yesterday as she became dizzy from low blood sugar and sustained a fall.  No problems ankle before.  8/10 pain now.  No numbness or tingling.  No problems with diabetic ulcers.  No other injuries..     Review of Systems:    No chest pain shortness of breath fevers or chills      History  Past Medical History:   Diagnosis Date   • Arthritis    • CKD (chronic kidney disease)    • Diabetes mellitus (HCC)    • Hyperlipidemia    • Hypertension      Past Surgical History:   Procedure Laterality Date   •  SECTION      x2   • CHOLECYSTECTOMY     • HYSTERECTOMY     • INCONTINENCE SURGERY     • TONSILLECTOMY       Family History   Problem Relation Age of Onset   • Arrhythmia Sister         has pacemaker   • Heart disease Brother         bypass sx      Social History     Tobacco Use   • Smoking status: Never Smoker   • Smokeless tobacco: Never Used   Vaping Use   • Vaping Use: Never used   Substance Use Topics   • Alcohol use: Never   • Drug use: Never     Medications Prior to Admission   Medication Sig Dispense Refill Last Dose   • Accu-Chek Softclix Lancets lancets 1 each 3 (Three) Times a Day. Test BG TID      • alendronate (FOSAMAX) 70 MG tablet Take 1 tablet by mouth Every 7 (Seven) Days.       • amLODIPine (NORVASC) 10 MG tablet Take 10 mg by mouth Daily.      • aspirin 81 MG EC tablet Take 1 tablet by mouth Every Other Day. Took one today 2022      • atorvastatin (LIPITOR) 40 MG tablet Take 1 tablet by mouth Daily.      • carvedilol (COREG) 12.5 MG tablet TAKE 1 TABLET BY MOUTH  TWICE DAILY WITH MEALS 180 tablet 1    • cholecalciferol (VITAMIN D3) 25 MCG (1000 UT) tablet Take 2,000 Units by mouth 2 (Two) Times a Day.      • ferrous sulfate 324 (65 Fe) MG tablet delayed-release EC tablet Take 324 mg by mouth Daily With Breakfast.      • glucagon (GLUCAGEN) 1 MG injection Inject 1 mg into the appropriate muscle as directed by prescriber 1 (One) Time As Needed.      • glucose blood test strip 1 each by Other route 3 (Three) Times a Day As Needed.      • insulin aspart (NovoLOG FlexPen) 100 UNIT/ML solution pen-injector sc pen Inject 10 Units under the skin into the appropriate area as directed 3 (Three) Times a Day With Meals.      • insulin detemir (Levemir FlexTouch) 100 UNIT/ML injection Inject 26 Units under the skin into the appropriate area as directed Every Night.      • losartan (COZAAR) 100 MG tablet Take 1 tablet by mouth Daily.      • Omega-3 Fatty Acids (fish oil) 1000 MG capsule capsule Take 1,000 mg by mouth 2 (Two) Times a Day With Meals.      • pantoprazole (PROTONIX) 40 MG EC tablet Take 40 mg by mouth 2 (two) times a day.      • SITagliptin (JANUVIA) 25 MG tablet Take 25 mg by mouth Daily.      • sodium bicarbonate 650 MG tablet Take 650 mg by mouth 3 (Three) Times a Day.      • sodium bicarbonate 650 MG tablet Take 1 tablet by mouth 3 (Three) Times a Day.      • Zinc 50 MG capsule Take 1 tablet by mouth Daily.         Ibuprofen    Scheduled Meds:amLODIPine, 10 mg, Oral, Daily  atorvastatin, 40 mg, Oral, Daily  cefTRIAXone, 2 g, Intravenous, Q24H  enoxaparin, 30 mg, Subcutaneous, Daily  ferrous sulfate, 324 mg, Oral, Daily With Breakfast  insulin glargine, 18 Units, Subcutaneous, Nightly  insulin lispro, 0-7 Units, Subcutaneous, TID AC  losartan, 100 mg, Oral, Daily  pantoprazole, 40 mg, Oral, BID AC  sodium bicarbonate, 650 mg, Oral, TID  sodium chloride, 10 mL, Intravenous, Q12H      Continuous Infusions:sodium chloride, 100 mL/hr, Last Rate: 100 mL/hr (09/01/22  "2130)      PRN Meds:.•  acetaminophen **OR** acetaminophen **OR** acetaminophen  •  dextrose  •  dextrose  •  glucagon (human recombinant)  •  hydrALAZINE  •  HYDROcodone-acetaminophen  •  ondansetron **OR** ondansetron  •  [COMPLETED] Insert peripheral IV **AND** sodium chloride  •  sodium chloride    Objective     Vital Signs   Vitals:    09/01/22 1853 09/01/22 2027 09/01/22 2350 09/02/22 0203   BP:  161/76 152/74 167/69   BP Location:  Right arm Right arm Right arm   Patient Position:  Lying Lying Lying   Pulse: 56 61 60 60   Resp:  18 18 18   Temp:  97.6 °F (36.4 °C) 98.1 °F (36.7 °C) 97.5 °F (36.4 °C)   TempSrc:  Oral Oral Oral   SpO2: 97% 98% 97% 96%   Weight:  90.7 kg (199 lb 15.3 oz)     Height:  165.1 cm (65\")           Physical Exam:   Alert and orient x3, mildly obese  Left ankle splinted.  Good sensation in toes.  5/5 dorsiflexion plantarflexion with good capillary refill  X-rays show a Lawson C lateral malleolar fracture with a displaced transverse medial malleolar fracture and a small posterior malleolar fracture with minimal displacement    Results Review:   I reviewed the patient's new clinical results.  I reviewed the patient's new imaging results    Lab Results (last 24 hours)     Procedure Component Value Units Date/Time    Basic Metabolic Panel [[id number]]  (Abnormal) Collected: 09/02/22 0327    Specimen: Blood Updated: 09/02/22 0423     Glucose 157 mg/dL      BUN 36 mg/dL      Creatinine 1.74 mg/dL      Sodium 137 mmol/L      Potassium 4.7 mmol/L      Chloride 102 mmol/L      CO2 25.0 mmol/L      Calcium 8.9 mg/dL      BUN/Creatinine Ratio 20.7     Anion Gap 10.0 mmol/L      eGFR 30.5 mL/min/1.73      Comment: National Kidney Foundation and American Society of Nephrology (ASN) Task Force recommended calculation based on the Chronic Kidney Disease Epidemiology Collaboration (CKD-EPI) equation refit without adjustment for race.       Narrative:      GFR Normal >60  Chronic Kidney Disease " <60  Kidney Failure <15      Troponin [[id number]]  (Normal) Collected: 09/02/22 0327    Specimen: Blood Updated: 09/02/22 0423     Troponin T <0.010 ng/mL     Narrative:      Troponin T Reference Range:  <= 0.03 ng/mL-   Negative for AMI  >0.03 ng/mL-     Abnormal for myocardial necrosis.  Clinicians would have to utilize clinical acumen, EKG, Troponin and serial changes to determine if it is an Acute Myocardial Infarction or myocardial injury due to an underlying chronic condition.       Results may be falsely decreased if patient taking Biotin.      Lactic Acid, Plasma [[id number]]  (Normal) Collected: 09/02/22 0327    Specimen: Blood Updated: 09/02/22 0422     Lactate 1.0 mmol/L     CBC Auto Differential [[id number]]  (Abnormal) Collected: 09/02/22 0327    Specimen: Blood Updated: 09/02/22 0402     WBC 9.50 10*3/mm3      RBC 3.69 10*6/mm3      Hemoglobin 11.2 g/dL      Hematocrit 33.7 %      MCV 91.3 fL      MCH 30.4 pg      MCHC 33.3 g/dL      RDW 13.3 %      RDW-SD 42.4 fl      MPV 7.8 fL      Platelets 261 10*3/mm3      Neutrophil % 65.8 %      Lymphocyte % 22.9 %      Monocyte % 10.5 %      Eosinophil % 0.5 %      Basophil % 0.3 %      Neutrophils, Absolute 6.30 10*3/mm3      Lymphocytes, Absolute 2.20 10*3/mm3      Monocytes, Absolute 1.00 10*3/mm3      Eosinophils, Absolute 0.00 10*3/mm3      Basophils, Absolute 0.00 10*3/mm3      nRBC 0.1 /100 WBC     POC Glucose Once [[id number]]  (Abnormal) Collected: 09/02/22 0204    Specimen: Blood Updated: 09/02/22 0205     Glucose 173 mg/dL      Comment: Serial Number: [id number]:  [id number]       POC Glucose Once [[id number]]  (Abnormal) Collected: 09/01/22 2030    Specimen: Blood Updated: 09/01/22 2031     Glucose 197 mg/dL      Comment: Serial Number: [id number]:  [id number]       Blood Culture - Blood, Arm, Left [[id number]] Collected: 09/01/22 1848    Specimen: Blood from Arm, Left Updated: 09/01/22 0972    Blood Culture - Blood, Arm, Right [[id number]]  Collected: 09/01/22 1848    Specimen: Blood from Arm, Right Updated: 09/01/22 1852    Troponin [[id number]]  (Normal) Collected: 09/01/22 1606    Specimen: Blood Updated: 09/01/22 1716     Troponin T <0.010 ng/mL     Narrative:      Troponin T Reference Range:  <= 0.03 ng/mL-   Negative for AMI  >0.03 ng/mL-     Abnormal for myocardial necrosis.  Clinicians would have to utilize clinical acumen, EKG, Troponin and serial changes to determine if it is an Acute Myocardial Infarction or myocardial injury due to an underlying chronic condition.       Results may be falsely decreased if patient taking Biotin.      Urinalysis, Microscopic Only - Urine, Clean Catch [[id number]]  (Abnormal) Collected: 09/01/22 1643    Specimen: Urine, Clean Catch Updated: 09/01/22 1713     RBC, UA None Seen /HPF      WBC, UA Too Numerous to Count /HPF      Bacteria, UA 4+ /HPF      Squamous Epithelial Cells, UA None Seen /HPF      Hyaline Casts, UA None Seen /LPF      Methodology Manual Light Microscopy    Urinalysis With Microscopic If Indicated (No Culture) - Urine, Clean Catch [[id number]]  (Abnormal) Collected: 09/01/22 1643    Specimen: Urine, Clean Catch Updated: 09/01/22 1702     Color, UA Yellow     Appearance, UA Turbid     pH, UA 6.0     Specific Gravity, UA 1.015     Glucose, UA Negative     Ketones, UA Trace     Bilirubin, UA Negative     Blood, UA Trace     Protein, UA 30 mg/dL (1+)     Leuk Esterase, UA Large (3+)     Nitrite, UA Negative     Urobilinogen, UA 0.2 E.U./dL    Manual Differential [[id number]]  (Abnormal) Collected: 09/01/22 1606    Specimen: Blood Updated: 09/01/22 1651     Neutrophil % 77.0 %      Lymphocyte % 13.0 %      Monocyte % 5.0 %      Eosinophil % 1.0 %      Bands %  3.0 %      Metamyelocyte % 1.0 %      Neutrophils Absolute 10.80 10*3/mm3      Lymphocytes Absolute 1.76 10*3/mm3      Monocytes Absolute 0.68 10*3/mm3      Eosinophils Absolute 0.14 10*3/mm3      RBC Morphology Normal     Toxic Granulation  Slight/1+     Platelet Morphology Normal    CBC & Differential [[id number]]  (Abnormal) Collected: 09/01/22 1606    Specimen: Blood Updated: 09/01/22 1651    Narrative:      The following orders were created for panel order CBC & Differential.  Procedure                               Abnormality         Status                     ---------                               -----------         ------                     CBC Auto Differential[[id number]]        Abnormal            Final result               Scan Slide[[id number]]                                       Final result                 Please view results for these tests on the individual orders.    CBC Auto Differential [[id number]]  (Abnormal) Collected: 09/01/22 1606    Specimen: Blood Updated: 09/01/22 1651     WBC 13.50 10*3/mm3      RBC 3.99 10*6/mm3      Hemoglobin 11.9 g/dL      Hematocrit 36.7 %      MCV 91.8 fL      MCH 29.7 pg      MCHC 32.4 g/dL      RDW 13.5 %      RDW-SD 42.9 fl      MPV 7.1 fL      Platelets 294 10*3/mm3     Scan Slide [[id number]] Collected: 09/01/22 1606    Specimen: Blood Updated: 09/01/22 1651     Scan Slide --     Comment: See Manual Differential Results       Comprehensive Metabolic Panel [[id number]]  (Abnormal) Collected: 09/01/22 1606    Specimen: Blood Updated: 09/01/22 1639     Glucose 128 mg/dL      BUN 39 mg/dL      Creatinine 2.01 mg/dL      Sodium 138 mmol/L      Potassium 4.6 mmol/L      Chloride 101 mmol/L      CO2 26.0 mmol/L      Calcium 9.5 mg/dL      Total Protein 7.3 g/dL      Albumin 4.20 g/dL      ALT (SGPT) 15 U/L      AST (SGOT) 17 U/L      Alkaline Phosphatase 30 U/L      Total Bilirubin 0.6 mg/dL      Globulin 3.1 gm/dL      A/G Ratio 1.4 g/dL      BUN/Creatinine Ratio 19.4     Anion Gap 11.0 mmol/L      eGFR 25.6 mL/min/1.73      Comment: National Kidney Foundation and American Society of Nephrology (ASN) Task Force recommended calculation based on the Chronic Kidney Disease Epidemiology Collaboration  (CKD-EPI) equation refit without adjustment for race.       Narrative:      GFR Normal >60  Chronic Kidney Disease <60  Kidney Failure <15      Magnesium [[id number]]  (Normal) Collected: 09/01/22 1606    Specimen: Blood Updated: 09/01/22 1639     Magnesium 1.7 mg/dL     Protime-INR [[id number]]  (Normal) Collected: 09/01/22 1606    Specimen: Blood Updated: 09/01/22 1624     Protime 10.6 Seconds      INR 1.03    aPTT [[id number]]  (Abnormal) Collected: 09/01/22 1606    Specimen: Blood Updated: 09/01/22 1624     PTT 23.4 seconds     POC Glucose Once [[id number]]  (Abnormal) Collected: 09/01/22 1613    Specimen: Blood Updated: 09/01/22 1614     Glucose 127 mg/dL      Comment: Serial Number: [id number]:  [id number]             Imaging Results (Last 24 Hours)     Procedure Component Value Units Date/Time    CT Cervical Spine Without Contrast [[id number]] Collected: 09/01/22 1611     Updated: 09/01/22 1628    Narrative:      Examination: CT CERVICAL SPINE WO CONTRAST-     Date of Exam: 9/1/2022 3:54 PM     Indication: Neck trauma (Age >= 65y).     Comparison: None available.     Technique: Axial volumetric noncontrast CT imaging of the cervical spine  was performed. Sagittal and coronal reconstructions were created for  interpretation. Automated exposure control and iterative reconstruction  methods were used.     Findings:  The cervical spine is maintained and alignment. There is minimal  anterolisthesis of C7 on T1 measuring 2 mm. Negative for cervical spine  fracture. The posterior spinous processes are intact. There is no locked  or perched facet. The dens is intact. Lateral masses of C1 are normally  aligned on C2. Lung apices without pneumothorax.     C2-3: There is no canal stenosis or foraminal stenosis.     C3-4: There is a small posterior central disc osteophyte complex. There  is bilateral uncovertebral spurring contributing to mild bilateral  foramina stenosis. Asymmetric right facet arthritis.     C4-5:  There is a posterior disc osteophyte complex with bilateral  uncovertebral spurring right greater than left. Moderate to severe right  foraminal stenosis. No left foraminal stenosis.     C5-6: There is a posterior disc osteophyte complex with bilateral  uncovertebral spurring. Moderate to severe right foraminal stenosis.  Mild to moderate left foraminal stenosis. Mild central canal stenosis.     C6-7: There is a posterior central disc osteophyte complex. There is  bilateral uncovertebral spurring contributing to moderate right  foraminal stenosis. Mild to moderate left foraminal stenosis.     C7-T1: There is asymmetric right-sided facet arthritis. Negative for  canal stenosis. Mild bilateral foraminal stenosis.       Impression:      1. Negative for cervical spine fracture.  2. Multilevel cervical degenerative findings above.     Electronically Signed By-Tong Lozoya MD On:9/1/2022 4:26 PM  This report was finalized on [id number] by  Tong Lozoya MD.    CT Head Without Contrast [[id number]] Collected: 09/01/22 1606     Updated: 09/01/22 1626    Narrative:         DATE OF EXAM:  9/1/2022 3:54 PM     PROCEDURE:   CT HEAD WO CONTRAST-     INDICATIONS:   trauma     COMPARISON:  CT head without contrast 04/19/2019     TECHNIQUE:   Routine transaxial cuts were obtained through the head without the  administration of contrast. Automated exposure control and iterative  reconstruction methods were used.      FINDINGS:  There is no intracranial hemorrhage. Negative for mass effect or midline  shift. Mild cerebral atrophy. Mild patchy areas of white matter  hypodensity suggesting changes of chronic microvascular disease.  Posterior fossa without acute abnormality. The globes are symmetric. No  retro-orbital abnormality. Thinning of the lens noted bilaterally.  Mastoid air cells well-aerated. Negative for calvarial fracture.        Impression:      1. No intracranial hemorrhage or acute intracranial abnormality.  2. Mild  white matter findings suggesting chronic microvascular disease.     Electronically Signed By-Tong Lozoya MD On:9/1/2022 4:11 PM  This report was finalized on [id number] by  Tong Lozoya MD.    XR Ankle 3+ View Left [[id number]] Collected: 09/01/22 1553     Updated: 09/01/22 1556    Narrative:      DATE OF EXAM:  9/1/2022 3:43 PM     PROCEDURE:  XR ANKLE 3+ VW LEFT-     INDICATIONS:  trauma fall. Pain.     COMPARISON:  No Comparisons Available     FINDINGS:  Soft tissue swelling. Osteopenia. Displaced fracture of the medial  malleolus with distal fracture fragment inferiorly displaced by 6 mm.  Oblique minimally displaced fracture distal fibula. Distal fracture  fragment is displaced lateral to the proximal fracture fragment by 2 mm.  Ankle mortise appears disrupted with widening medially and narrowing  laterally. Posterior malleoli or fracture fragment is seen without  displacement. Mild subtalar joint space narrowing. Large plantar  calcaneal spur. Small enthesophyte Achilles tendon insertion.       Impression:      Trimalleolar fracture. Mild displacement medial malleolus and distal  fibula. Posterior malleoli or fracture fragment is not significantly  displaced. Ankle mortise appears disrupted.     Electronically Signed By-Randi Stanford MD On:9/1/2022 3:54 PM  This report was finalized on [id number] by  Randi Stanford MD.    XR Chest 1 View [[id number]] Collected: 09/01/22 1552     Updated: 09/01/22 1555    Narrative:      DATE OF EXAM:  9/1/2022 3:42 PM     PROCEDURE:  XR CHEST 1 VW-     INDICATIONS:  weakness  fall.     COMPARISON:  No Comparisons Available     TECHNIQUE:   Single radiographic view of the chest was obtained.     FINDINGS:  Lungs are normally expanded. Mild cardiomegaly. No pneumothorax or  pleural effusion or focal pulmonary parenchymal opacity. Pulmonary  vessels are distinct. Bones and soft tissues are normal.       Impression:      No acute cardiopulmonary abnormality.     Electronically  Signed By-Randi Stanford MD On:2022 3:53 PM  This report was finalized on [id number] by  Randi Stanford MD.            Assessment & Plan     Left trimalleolar ankle fracture    Plan open reduction internal fixation Saturday morning.  Patient understands the risks.  These include bleeding, infection, damage to nerves or vessels, malunion, nonunion, possible need for further surgery, pain, and risk of medical or anesthetic complications including risk of death.  She will need to be toe-touch weight-bear for 6 weeks afterwards.  She may discharge after surgery to get to her relatives        Tyrell Galarza MD  22  06:14 EDT

## 2022-09-02 NOTE — PLAN OF CARE
Goal Outcome Evaluation:  Plan of Care Reviewed With: patient        Progress: no change  Outcome Evaluation: pt c/o pain gave prn meds, on IV fluids, leg elevated, NPO since midnight, cardio consulted

## 2022-09-03 ENCOUNTER — APPOINTMENT (OUTPATIENT)
Dept: GENERAL RADIOLOGY | Facility: HOSPITAL | Age: 77
End: 2022-09-03

## 2022-09-03 ENCOUNTER — ANESTHESIA (OUTPATIENT)
Dept: PERIOP | Facility: HOSPITAL | Age: 77
End: 2022-09-03

## 2022-09-03 ENCOUNTER — ANESTHESIA EVENT (OUTPATIENT)
Dept: PERIOP | Facility: HOSPITAL | Age: 77
End: 2022-09-03

## 2022-09-03 LAB
ANION GAP SERPL CALCULATED.3IONS-SCNC: 11 MMOL/L (ref 5–15)
ANION GAP SERPL CALCULATED.3IONS-SCNC: 11 MMOL/L (ref 5–15)
BACTERIA UR QL AUTO: ABNORMAL /HPF
BASOPHILS # BLD AUTO: 0 10*3/MM3 (ref 0–0.2)
BASOPHILS NFR BLD AUTO: 0.5 % (ref 0–1.5)
BILIRUB UR QL STRIP: NEGATIVE
BUN SERPL-MCNC: 30 MG/DL (ref 8–23)
BUN SERPL-MCNC: 32 MG/DL (ref 8–23)
BUN/CREAT SERPL: 17 (ref 7–25)
BUN/CREAT SERPL: 18.1 (ref 7–25)
CALCIUM SPEC-SCNC: 8.1 MG/DL (ref 8.6–10.5)
CALCIUM SPEC-SCNC: 8.5 MG/DL (ref 8.6–10.5)
CHLORIDE SERPL-SCNC: 105 MMOL/L (ref 98–107)
CHLORIDE SERPL-SCNC: 106 MMOL/L (ref 98–107)
CLARITY UR: CLEAR
CO2 SERPL-SCNC: 21 MMOL/L (ref 22–29)
CO2 SERPL-SCNC: 21 MMOL/L (ref 22–29)
COLOR UR: YELLOW
CREAT SERPL-MCNC: 1.76 MG/DL (ref 0.57–1)
CREAT SERPL-MCNC: 1.77 MG/DL (ref 0.57–1)
DEPRECATED RDW RBC AUTO: 44.2 FL (ref 37–54)
EGFRCR SERPLBLD CKD-EPI 2021: 29.9 ML/MIN/1.73
EGFRCR SERPLBLD CKD-EPI 2021: 30.1 ML/MIN/1.73
EOSINOPHIL # BLD AUTO: 0.1 10*3/MM3 (ref 0–0.4)
EOSINOPHIL NFR BLD AUTO: 1.6 % (ref 0.3–6.2)
ERYTHROCYTE [DISTWIDTH] IN BLOOD BY AUTOMATED COUNT: 13.6 % (ref 12.3–15.4)
GLUCOSE BLDC GLUCOMTR-MCNC: 146 MG/DL (ref 70–105)
GLUCOSE BLDC GLUCOMTR-MCNC: 150 MG/DL (ref 70–105)
GLUCOSE BLDC GLUCOMTR-MCNC: 190 MG/DL (ref 70–105)
GLUCOSE BLDC GLUCOMTR-MCNC: 203 MG/DL (ref 70–105)
GLUCOSE BLDC GLUCOMTR-MCNC: 379 MG/DL (ref 70–105)
GLUCOSE BLDC GLUCOMTR-MCNC: 439 MG/DL (ref 70–105)
GLUCOSE BLDC GLUCOMTR-MCNC: 461 MG/DL (ref 70–105)
GLUCOSE BLDC GLUCOMTR-MCNC: 506 MG/DL (ref 70–105)
GLUCOSE BLDC GLUCOMTR-MCNC: 527 MG/DL (ref 70–105)
GLUCOSE SERPL-MCNC: 165 MG/DL (ref 65–99)
GLUCOSE SERPL-MCNC: 191 MG/DL (ref 65–99)
GLUCOSE UR STRIP-MCNC: ABNORMAL MG/DL
HCT VFR BLD AUTO: 34.2 % (ref 34–46.6)
HGB BLD-MCNC: 11 G/DL (ref 12–15.9)
HGB UR QL STRIP.AUTO: NEGATIVE
HYALINE CASTS UR QL AUTO: ABNORMAL /LPF
KETONES UR QL STRIP: NEGATIVE
LEUKOCYTE ESTERASE UR QL STRIP.AUTO: ABNORMAL
LYMPHOCYTES # BLD AUTO: 1.6 10*3/MM3 (ref 0.7–3.1)
LYMPHOCYTES NFR BLD AUTO: 17.2 % (ref 19.6–45.3)
MCH RBC QN AUTO: 29.9 PG (ref 26.6–33)
MCHC RBC AUTO-ENTMCNC: 32 G/DL (ref 31.5–35.7)
MCV RBC AUTO: 93.4 FL (ref 79–97)
MONOCYTES # BLD AUTO: 1 10*3/MM3 (ref 0.1–0.9)
MONOCYTES NFR BLD AUTO: 10.6 % (ref 5–12)
NEUTROPHILS NFR BLD AUTO: 6.5 10*3/MM3 (ref 1.7–7)
NEUTROPHILS NFR BLD AUTO: 70.1 % (ref 42.7–76)
NITRITE UR QL STRIP: NEGATIVE
NRBC BLD AUTO-RTO: 0 /100 WBC (ref 0–0.2)
PH UR STRIP.AUTO: <=5 [PH] (ref 5–8)
PLATELET # BLD AUTO: 258 10*3/MM3 (ref 140–450)
PMV BLD AUTO: 8.3 FL (ref 6–12)
POTASSIUM SERPL-SCNC: 4.7 MMOL/L (ref 3.5–5.2)
POTASSIUM SERPL-SCNC: 4.9 MMOL/L (ref 3.5–5.2)
PROT UR QL STRIP: ABNORMAL
RBC # BLD AUTO: 3.67 10*6/MM3 (ref 3.77–5.28)
RBC # UR STRIP: ABNORMAL /HPF
REF LAB TEST METHOD: ABNORMAL
SODIUM SERPL-SCNC: 137 MMOL/L (ref 136–145)
SODIUM SERPL-SCNC: 138 MMOL/L (ref 136–145)
SP GR UR STRIP: 1.01 (ref 1–1.03)
SQUAMOUS #/AREA URNS HPF: ABNORMAL /HPF
UROBILINOGEN UR QL STRIP: ABNORMAL
WBC # UR STRIP: ABNORMAL /HPF
WBC NRBC COR # BLD: 9.2 10*3/MM3 (ref 3.4–10.8)

## 2022-09-03 PROCEDURE — C1713 ANCHOR/SCREW BN/BN,TIS/BN: HCPCS | Performed by: ORTHOPAEDIC SURGERY

## 2022-09-03 PROCEDURE — 76000 FLUOROSCOPY <1 HR PHYS/QHP: CPT

## 2022-09-03 PROCEDURE — 87040 BLOOD CULTURE FOR BACTERIA: CPT | Performed by: ORTHOPAEDIC SURGERY

## 2022-09-03 PROCEDURE — 99232 SBSQ HOSP IP/OBS MODERATE 35: CPT | Performed by: HOSPITALIST

## 2022-09-03 PROCEDURE — 25010000002 PROPOFOL 10 MG/ML EMULSION: Performed by: ANESTHESIOLOGY

## 2022-09-03 PROCEDURE — 25010000002 MIDAZOLAM PER 1 MG: Performed by: ANESTHESIOLOGY

## 2022-09-03 PROCEDURE — 99232 SBSQ HOSP IP/OBS MODERATE 35: CPT | Performed by: INTERNAL MEDICINE

## 2022-09-03 PROCEDURE — 97162 PT EVAL MOD COMPLEX 30 MIN: CPT

## 2022-09-03 PROCEDURE — 25010000002 CEFAZOLIN PER 500 MG: Performed by: ANESTHESIOLOGY

## 2022-09-03 PROCEDURE — 76942 ECHO GUIDE FOR BIOPSY: CPT | Performed by: ORTHOPAEDIC SURGERY

## 2022-09-03 PROCEDURE — 63710000001 INSULIN REGULAR HUMAN PER 5 UNITS: Performed by: INTERNAL MEDICINE

## 2022-09-03 PROCEDURE — 25010000002 ROPIVACAINE PER 1 MG: Performed by: ANESTHESIOLOGY

## 2022-09-03 PROCEDURE — 73600 X-RAY EXAM OF ANKLE: CPT

## 2022-09-03 PROCEDURE — 0QSH04Z REPOSITION LEFT TIBIA WITH INTERNAL FIXATION DEVICE, OPEN APPROACH: ICD-10-PCS | Performed by: ORTHOPAEDIC SURGERY

## 2022-09-03 PROCEDURE — 85025 COMPLETE CBC W/AUTO DIFF WBC: CPT | Performed by: HOSPITALIST

## 2022-09-03 PROCEDURE — 25010000002 ONDANSETRON PER 1 MG: Performed by: ANESTHESIOLOGY

## 2022-09-03 PROCEDURE — 80048 BASIC METABOLIC PNL TOTAL CA: CPT | Performed by: HOSPITALIST

## 2022-09-03 PROCEDURE — 63710000001 INSULIN LISPRO (HUMAN) PER 5 UNITS: Performed by: ORTHOPAEDIC SURGERY

## 2022-09-03 PROCEDURE — 63710000001 INSULIN GLARGINE PER 5 UNITS: Performed by: INTERNAL MEDICINE

## 2022-09-03 PROCEDURE — 97530 THERAPEUTIC ACTIVITIES: CPT

## 2022-09-03 PROCEDURE — 87086 URINE CULTURE/COLONY COUNT: CPT | Performed by: HOSPITALIST

## 2022-09-03 PROCEDURE — 81001 URINALYSIS AUTO W/SCOPE: CPT | Performed by: HOSPITALIST

## 2022-09-03 PROCEDURE — 25010000002 DEXAMETHASONE PER 1 MG: Performed by: ANESTHESIOLOGY

## 2022-09-03 PROCEDURE — 25010000002 CEFTRIAXONE PER 250 MG: Performed by: ORTHOPAEDIC SURGERY

## 2022-09-03 PROCEDURE — 82962 GLUCOSE BLOOD TEST: CPT

## 2022-09-03 PROCEDURE — 97535 SELF CARE MNGMENT TRAINING: CPT

## 2022-09-03 PROCEDURE — 80048 BASIC METABOLIC PNL TOTAL CA: CPT | Performed by: ORTHOPAEDIC SURGERY

## 2022-09-03 DEVICE — IMPLANTABLE DEVICE
Type: IMPLANTABLE DEVICE | Site: ANKLE | Status: FUNCTIONAL
Brand: ORTHOLOC

## 2022-09-03 DEVICE — IMPLANTABLE DEVICE
Type: IMPLANTABLE DEVICE | Site: ANKLE | Status: FUNCTIONAL
Brand: ORTHOLOC 3DI

## 2022-09-03 DEVICE — IMPLANTABLE DEVICE
Type: IMPLANTABLE DEVICE | Site: ANKLE | Status: FUNCTIONAL
Brand: DART-FIRE

## 2022-09-03 DEVICE — IMPLANTABLE DEVICE
Type: IMPLANTABLE DEVICE | Site: ANKLE | Status: FUNCTIONAL
Brand: ORTHOLOC 3DI PLATING SYSTEM

## 2022-09-03 RX ORDER — ROPIVACAINE HYDROCHLORIDE 5 MG/ML
INJECTION, SOLUTION EPIDURAL; INFILTRATION; PERINEURAL
Status: COMPLETED | OUTPATIENT
Start: 2022-09-03 | End: 2022-09-03

## 2022-09-03 RX ORDER — PHENYLEPHRINE HCL IN 0.9% NACL 1 MG/10 ML
SYRINGE (ML) INTRAVENOUS AS NEEDED
Status: DISCONTINUED | OUTPATIENT
Start: 2022-09-03 | End: 2022-09-03 | Stop reason: SURG

## 2022-09-03 RX ORDER — OXYCODONE HYDROCHLORIDE AND ACETAMINOPHEN 5; 325 MG/1; MG/1
1 TABLET ORAL EVERY 4 HOURS PRN
Qty: 30 TABLET | Refills: 0 | Status: SHIPPED | OUTPATIENT
Start: 2022-09-03

## 2022-09-03 RX ORDER — HYDRALAZINE HYDROCHLORIDE 20 MG/ML
5 INJECTION INTRAMUSCULAR; INTRAVENOUS
Status: DISCONTINUED | OUTPATIENT
Start: 2022-09-03 | End: 2022-09-03 | Stop reason: HOSPADM

## 2022-09-03 RX ORDER — LABETALOL HYDROCHLORIDE 5 MG/ML
5 INJECTION, SOLUTION INTRAVENOUS
Status: DISCONTINUED | OUTPATIENT
Start: 2022-09-03 | End: 2022-09-03 | Stop reason: HOSPADM

## 2022-09-03 RX ORDER — ONDANSETRON 2 MG/ML
INJECTION INTRAMUSCULAR; INTRAVENOUS AS NEEDED
Status: DISCONTINUED | OUTPATIENT
Start: 2022-09-03 | End: 2022-09-03 | Stop reason: SURG

## 2022-09-03 RX ORDER — PROMETHAZINE HYDROCHLORIDE 25 MG/1
25 TABLET ORAL ONCE AS NEEDED
Status: DISCONTINUED | OUTPATIENT
Start: 2022-09-03 | End: 2022-09-03 | Stop reason: HOSPADM

## 2022-09-03 RX ORDER — EPHEDRINE SULFATE 5 MG/ML
INJECTION INTRAVENOUS AS NEEDED
Status: DISCONTINUED | OUTPATIENT
Start: 2022-09-03 | End: 2022-09-03 | Stop reason: SURG

## 2022-09-03 RX ORDER — ACETAMINOPHEN 325 MG/1
325 TABLET ORAL EVERY 4 HOURS PRN
Status: DISCONTINUED | OUTPATIENT
Start: 2022-09-03 | End: 2022-09-03

## 2022-09-03 RX ORDER — ONDANSETRON 2 MG/ML
4 INJECTION INTRAMUSCULAR; INTRAVENOUS EVERY 6 HOURS PRN
Status: DISCONTINUED | OUTPATIENT
Start: 2022-09-03 | End: 2022-09-04 | Stop reason: HOSPADM

## 2022-09-03 RX ORDER — FENTANYL CITRATE 50 UG/ML
50 INJECTION, SOLUTION INTRAMUSCULAR; INTRAVENOUS
Status: DISCONTINUED | OUTPATIENT
Start: 2022-09-03 | End: 2022-09-03 | Stop reason: HOSPADM

## 2022-09-03 RX ORDER — SODIUM CHLORIDE 9 MG/ML
75 INJECTION, SOLUTION INTRAVENOUS CONTINUOUS
Status: DISCONTINUED | OUTPATIENT
Start: 2022-09-03 | End: 2022-09-04 | Stop reason: HOSPADM

## 2022-09-03 RX ORDER — OXYCODONE HYDROCHLORIDE 5 MG/1
10 TABLET ORAL EVERY 4 HOURS PRN
Status: DISCONTINUED | OUTPATIENT
Start: 2022-09-03 | End: 2022-09-03 | Stop reason: HOSPADM

## 2022-09-03 RX ORDER — OXYCODONE HYDROCHLORIDE 5 MG/1
10 TABLET ORAL EVERY 4 HOURS PRN
Status: DISCONTINUED | OUTPATIENT
Start: 2022-09-03 | End: 2022-09-04 | Stop reason: HOSPADM

## 2022-09-03 RX ORDER — MIDAZOLAM HYDROCHLORIDE 1 MG/ML
INJECTION INTRAMUSCULAR; INTRAVENOUS
Status: COMPLETED | OUTPATIENT
Start: 2022-09-03 | End: 2022-09-03

## 2022-09-03 RX ORDER — PROPOFOL 10 MG/ML
VIAL (ML) INTRAVENOUS AS NEEDED
Status: DISCONTINUED | OUTPATIENT
Start: 2022-09-03 | End: 2022-09-03 | Stop reason: SURG

## 2022-09-03 RX ORDER — HYDROCODONE BITARTRATE AND ACETAMINOPHEN 5; 325 MG/1; MG/1
1 TABLET ORAL ONCE AS NEEDED
Status: DISCONTINUED | OUTPATIENT
Start: 2022-09-03 | End: 2022-09-03 | Stop reason: HOSPADM

## 2022-09-03 RX ORDER — FLUMAZENIL 0.1 MG/ML
0.2 INJECTION INTRAVENOUS AS NEEDED
Status: DISCONTINUED | OUTPATIENT
Start: 2022-09-03 | End: 2022-09-03 | Stop reason: HOSPADM

## 2022-09-03 RX ORDER — MORPHINE SULFATE 4 MG/ML
4 INJECTION, SOLUTION INTRAMUSCULAR; INTRAVENOUS
Status: DISCONTINUED | OUTPATIENT
Start: 2022-09-03 | End: 2022-09-04 | Stop reason: HOSPADM

## 2022-09-03 RX ORDER — LIDOCAINE HYDROCHLORIDE AND EPINEPHRINE 5; 5 MG/ML; UG/ML
INJECTION, SOLUTION INFILTRATION; PERINEURAL AS NEEDED
Status: DISCONTINUED | OUTPATIENT
Start: 2022-09-03 | End: 2022-09-03 | Stop reason: HOSPADM

## 2022-09-03 RX ORDER — DEXAMETHASONE SODIUM PHOSPHATE 4 MG/ML
INJECTION, SOLUTION INTRA-ARTICULAR; INTRALESIONAL; INTRAMUSCULAR; INTRAVENOUS; SOFT TISSUE
Status: COMPLETED | OUTPATIENT
Start: 2022-09-03 | End: 2022-09-03

## 2022-09-03 RX ORDER — POLYETHYLENE GLYCOL 3350 17 G/17G
17 POWDER, FOR SOLUTION ORAL DAILY
Status: DISCONTINUED | OUTPATIENT
Start: 2022-09-03 | End: 2022-09-04 | Stop reason: HOSPADM

## 2022-09-03 RX ORDER — PROMETHAZINE HYDROCHLORIDE 25 MG/1
25 SUPPOSITORY RECTAL ONCE AS NEEDED
Status: DISCONTINUED | OUTPATIENT
Start: 2022-09-03 | End: 2022-09-03 | Stop reason: HOSPADM

## 2022-09-03 RX ORDER — MORPHINE SULFATE 4 MG/ML
2 INJECTION, SOLUTION INTRAMUSCULAR; INTRAVENOUS
Status: DISCONTINUED | OUTPATIENT
Start: 2022-09-03 | End: 2022-09-03 | Stop reason: HOSPADM

## 2022-09-03 RX ORDER — ONDANSETRON 4 MG/1
4 TABLET, FILM COATED ORAL EVERY 6 HOURS PRN
Status: DISCONTINUED | OUTPATIENT
Start: 2022-09-03 | End: 2022-09-04 | Stop reason: HOSPADM

## 2022-09-03 RX ORDER — NALOXONE HCL 0.4 MG/ML
0.4 VIAL (ML) INJECTION AS NEEDED
Status: DISCONTINUED | OUTPATIENT
Start: 2022-09-03 | End: 2022-09-03 | Stop reason: HOSPADM

## 2022-09-03 RX ORDER — ONDANSETRON 2 MG/ML
4 INJECTION INTRAMUSCULAR; INTRAVENOUS ONCE AS NEEDED
Status: DISCONTINUED | OUTPATIENT
Start: 2022-09-03 | End: 2022-09-03 | Stop reason: HOSPADM

## 2022-09-03 RX ORDER — CEFAZOLIN SODIUM 1 G/3ML
INJECTION, POWDER, FOR SOLUTION INTRAMUSCULAR; INTRAVENOUS AS NEEDED
Status: DISCONTINUED | OUTPATIENT
Start: 2022-09-03 | End: 2022-09-03 | Stop reason: SURG

## 2022-09-03 RX ORDER — DIPHENHYDRAMINE HYDROCHLORIDE 50 MG/ML
12.5 INJECTION INTRAMUSCULAR; INTRAVENOUS
Status: DISCONTINUED | OUTPATIENT
Start: 2022-09-03 | End: 2022-09-03 | Stop reason: HOSPADM

## 2022-09-03 RX ORDER — NALOXONE HCL 0.4 MG/ML
0.4 VIAL (ML) INJECTION
Status: DISCONTINUED | OUTPATIENT
Start: 2022-09-03 | End: 2022-09-04 | Stop reason: HOSPADM

## 2022-09-03 RX ORDER — SODIUM CHLORIDE 0.9 % (FLUSH) 0.9 %
1-10 SYRINGE (ML) INJECTION AS NEEDED
Status: DISCONTINUED | OUTPATIENT
Start: 2022-09-03 | End: 2022-09-04 | Stop reason: HOSPADM

## 2022-09-03 RX ORDER — HYDROCODONE BITARTRATE AND ACETAMINOPHEN 7.5; 325 MG/1; MG/1
1 TABLET ORAL EVERY 4 HOURS PRN
Status: DISCONTINUED | OUTPATIENT
Start: 2022-09-03 | End: 2022-09-04 | Stop reason: HOSPADM

## 2022-09-03 RX ORDER — ONDANSETRON HYDROCHLORIDE 8 MG/1
8 TABLET, FILM COATED ORAL EVERY 12 HOURS PRN
Qty: 15 TABLET | Refills: 0 | Status: SHIPPED | OUTPATIENT
Start: 2022-09-03

## 2022-09-03 RX ORDER — ASPIRIN 81 MG/1
81 TABLET ORAL EVERY OTHER DAY
Status: DISCONTINUED | OUTPATIENT
Start: 2022-09-03 | End: 2022-09-04 | Stop reason: HOSPADM

## 2022-09-03 RX ORDER — IPRATROPIUM BROMIDE AND ALBUTEROL SULFATE 2.5; .5 MG/3ML; MG/3ML
3 SOLUTION RESPIRATORY (INHALATION) ONCE AS NEEDED
Status: DISCONTINUED | OUTPATIENT
Start: 2022-09-03 | End: 2022-09-03 | Stop reason: HOSPADM

## 2022-09-03 RX ORDER — SODIUM CHLORIDE 9 MG/ML
125 INJECTION, SOLUTION INTRAVENOUS CONTINUOUS
Status: DISPENSED | OUTPATIENT
Start: 2022-09-03 | End: 2022-09-04

## 2022-09-03 RX ORDER — SODIUM CHLORIDE, SODIUM LACTATE, POTASSIUM CHLORIDE, CALCIUM CHLORIDE 600; 310; 30; 20 MG/100ML; MG/100ML; MG/100ML; MG/100ML
INJECTION, SOLUTION INTRAVENOUS CONTINUOUS PRN
Status: DISCONTINUED | OUTPATIENT
Start: 2022-09-03 | End: 2022-09-03 | Stop reason: SURG

## 2022-09-03 RX ADMIN — INSULIN GLARGINE 20 UNITS: 100 INJECTION, SOLUTION SUBCUTANEOUS at 21:03

## 2022-09-03 RX ADMIN — ROPIVACAINE HYDROCHLORIDE 60 ML: 5 INJECTION EPIDURAL; INFILTRATION; PERINEURAL at 07:55

## 2022-09-03 RX ADMIN — CEFTRIAXONE 2 G: 2 INJECTION, POWDER, FOR SOLUTION INTRAMUSCULAR; INTRAVENOUS at 17:41

## 2022-09-03 RX ADMIN — INSULIN LISPRO 7 UNITS: 100 INJECTION, SOLUTION INTRAVENOUS; SUBCUTANEOUS at 18:05

## 2022-09-03 RX ADMIN — PROPOFOL 150 MG: 10 INJECTION, EMULSION INTRAVENOUS at 08:05

## 2022-09-03 RX ADMIN — OXYCODONE 10 MG: 5 TABLET ORAL at 21:10

## 2022-09-03 RX ADMIN — POLYETHYLENE GLYCOL 3350 17 G: 17 POWDER, FOR SOLUTION ORAL at 11:31

## 2022-09-03 RX ADMIN — SODIUM CHLORIDE 100 ML/HR: 9 INJECTION, SOLUTION INTRAVENOUS at 05:09

## 2022-09-03 RX ADMIN — EPHEDRINE SULFATE 10 MG: 5 INJECTION INTRAVENOUS at 08:23

## 2022-09-03 RX ADMIN — AMLODIPINE BESYLATE 10 MG: 5 TABLET ORAL at 10:18

## 2022-09-03 RX ADMIN — SODIUM CHLORIDE 125 ML/HR: 9 INJECTION, SOLUTION INTRAVENOUS at 10:34

## 2022-09-03 RX ADMIN — EPHEDRINE SULFATE 10 MG: 5 INJECTION INTRAVENOUS at 08:59

## 2022-09-03 RX ADMIN — Medication 100 MCG: at 08:17

## 2022-09-03 RX ADMIN — ALPRAZOLAM 0.5 MG: 0.5 TABLET ORAL at 10:18

## 2022-09-03 RX ADMIN — ALPRAZOLAM 0.5 MG: 0.5 TABLET ORAL at 22:15

## 2022-09-03 RX ADMIN — INSULIN HUMAN 10 UNITS: 100 INJECTION, SOLUTION PARENTERAL at 19:36

## 2022-09-03 RX ADMIN — ASPIRIN 81 MG: 81 TABLET, COATED ORAL at 11:30

## 2022-09-03 RX ADMIN — SODIUM CHLORIDE, SODIUM LACTATE, POTASSIUM CHLORIDE, AND CALCIUM CHLORIDE: .6; .31; .03; .02 INJECTION, SOLUTION INTRAVENOUS at 07:59

## 2022-09-03 RX ADMIN — DEXAMETHASONE SODIUM PHOSPHATE 8 MG: 4 INJECTION, SOLUTION INTRAMUSCULAR; INTRAVENOUS at 07:55

## 2022-09-03 RX ADMIN — EPHEDRINE SULFATE 5 MG: 5 INJECTION INTRAVENOUS at 08:55

## 2022-09-03 RX ADMIN — Medication 10 ML: at 21:21

## 2022-09-03 RX ADMIN — MIDAZOLAM 2 MG: 1 INJECTION INTRAMUSCULAR; INTRAVENOUS at 07:55

## 2022-09-03 RX ADMIN — SODIUM BICARBONATE 650 MG TABLET 650 MG: at 21:02

## 2022-09-03 RX ADMIN — PANTOPRAZOLE SODIUM 40 MG: 40 TABLET, DELAYED RELEASE ORAL at 17:35

## 2022-09-03 RX ADMIN — CEFAZOLIN SODIUM 2 G: 1 INJECTION, POWDER, FOR SOLUTION INTRAMUSCULAR; INTRAVENOUS at 08:06

## 2022-09-03 RX ADMIN — SODIUM CHLORIDE 125 ML/HR: 9 INJECTION, SOLUTION INTRAVENOUS at 21:19

## 2022-09-03 RX ADMIN — ONDANSETRON 4 MG: 2 INJECTION INTRAMUSCULAR; INTRAVENOUS at 08:53

## 2022-09-03 RX ADMIN — INSULIN LISPRO 3 UNITS: 100 INJECTION, SOLUTION INTRAVENOUS; SUBCUTANEOUS at 12:01

## 2022-09-03 RX ADMIN — SODIUM BICARBONATE 650 MG TABLET 650 MG: at 17:35

## 2022-09-03 RX ADMIN — ATORVASTATIN CALCIUM 40 MG: 40 TABLET, FILM COATED ORAL at 11:30

## 2022-09-03 RX ADMIN — LOSARTAN POTASSIUM 100 MG: 50 TABLET, FILM COATED ORAL at 10:18

## 2022-09-03 RX ADMIN — Medication 100 MCG: at 08:59

## 2022-09-03 NOTE — ANESTHESIA POSTPROCEDURE EVALUATION
Patient: eNeta Harrell    Procedure Summary     Date: 09/03/22 Room / Location: [facility] OR 11 / [facility] MAIN OR    Anesthesia Start: 0759 Anesthesia Stop: 0913    Procedure: ANKLE OPEN REDUCTION INTERNAL FIXATION (Left Ankle) Diagnosis:     Surgeons: Tyrell Galarza MD Provider: Mark Saldana MD    Anesthesia Type: general with block ASA Status: 3          Anesthesia Type: general with block    Vitals  Vitals Value Taken Time   /57 09/03/22 0957   Temp 97.7 °F (36.5 °C) 09/03/22 0957   Pulse 86 09/03/22 0958   Resp 15 09/03/22 0957   SpO2 95 % 09/03/22 0958   Vitals shown include unvalidated device data.        Post Anesthesia Care and Evaluation    Patient location during evaluation: bedside  Patient participation: complete - patient participated  Level of consciousness: awake and alert  Pain score: 1  Pain management: adequate    Airway patency: patent  Anesthetic complications: No anesthetic complications  PONV Status: none  Cardiovascular status: acceptable  Respiratory status: acceptable  Hydration status: acceptable  Post Neuraxial Block status: Motor and sensory function returned to baseline

## 2022-09-03 NOTE — PROGRESS NOTES
Referring Provider: Hospitalist    Reason for follow-up: Syncope     Patient Care Team:  Landon Smith MD as PCP - General  Landon Smith MD as PCP - Family Medicine  Cesar Ellis MD as Consulting Physician (Cardiology)    Subjective .  Patient feeling better today without any symptoms    Objective  Lying in bed comfortably     Review of Systems   Constitutional: Negative for fever and malaise/fatigue.   Cardiovascular: Negative for chest pain, dyspnea on exertion and palpitations.   Respiratory: Negative for cough and shortness of breath.    Skin: Negative for rash.   Gastrointestinal: Negative for abdominal pain, nausea and vomiting.   Neurological: Negative for focal weakness and headaches.   All other systems reviewed and are negative.      Ibuprofen    Scheduled Meds:amLODIPine, 10 mg, Oral, Daily  aspirin, 81 mg, Oral, Every Other Day  atorvastatin, 40 mg, Oral, Daily  cefTRIAXone, 2 g, Intravenous, Q24H  ferrous sulfate, 324 mg, Oral, Daily With Breakfast  insulin glargine, 18 Units, Subcutaneous, Nightly  insulin lispro, 0-7 Units, Subcutaneous, TID AC  losartan, 100 mg, Oral, Daily  pantoprazole, 40 mg, Oral, BID AC  polyethylene glycol, 17 g, Oral, Daily  sodium bicarbonate, 650 mg, Oral, TID  sodium chloride, 10 mL, Intravenous, Q12H      Continuous Infusions:sodium chloride, 100 mL/hr, Last Rate: 100 mL/hr (09/03/22 0509)  sodium chloride, 75 mL/hr  sodium chloride, 125 mL/hr, Last Rate: 125 mL/hr (09/03/22 1034)      PRN Meds:.•  acetaminophen **OR** acetaminophen **OR** acetaminophen  •  ALPRAZolam  •  dextrose  •  dextrose  •  glucagon (human recombinant)  •  hydrALAZINE  •  HYDROcodone-acetaminophen  •  Morphine **AND** naloxone  •  ondansetron **OR** ondansetron  •  oxyCODONE  •  sodium chloride  •  [COMPLETED] Insert peripheral IV **AND** sodium chloride  •  sodium chloride        VITAL SIGNS  Vitals:    09/03/22 0928 09/03/22 0943 09/03/22 0957 09/03/22 1015   BP: 158/54 160/50 149/57  "153/71   BP Location:   Left arm Left arm   Patient Position:   Lying Lying   Pulse: 76 81 82 81   Resp: 14 16 15 16   Temp:   97.7 °F (36.5 °C) 97.6 °F (36.4 °C)   TempSrc:   Temporal Oral   SpO2: 98% 99% 96% 97%   Weight:       Height:           Flowsheet Rows    Flowsheet Row First Filed Value   Admission Height 165.1 cm (65\") Documented at 09/01/2022 1409   Admission Weight 88.9 kg (196 lb) Documented at 09/01/2022 1409           TELEMETRY: Sinus rhythm    Physical Exam:  Constitutional:       Appearance: Well-developed.   Eyes:      General: No scleral icterus.     Conjunctiva/sclera: Conjunctivae normal.   HENT:      Head: Normocephalic and atraumatic.   Neck:      Vascular: No carotid bruit or JVD.   Pulmonary:      Effort: Pulmonary effort is normal.      Breath sounds: Normal breath sounds. No wheezing. No rales.   Cardiovascular:      Normal rate. Regular rhythm.   Pulses:     Intact distal pulses.   Abdominal:      General: Bowel sounds are normal.      Palpations: Abdomen is soft.   Musculoskeletal:      Cervical back: Normal range of motion and neck supple. Skin:     General: Skin is warm and dry.      Findings: No rash.   Neurological:      Mental Status: Alert.          Results Review:   I reviewed the patient's new clinical results.  Lab Results (last 24 hours)     Procedure Component Value Units Date/Time    POC Glucose Once [[id number]]  (Abnormal) Collected: 09/03/22 1144    Specimen: Blood Updated: 09/03/22 1146     Glucose 203 mg/dL      Comment: Serial Number: [id number]:  [id number]       Basic Metabolic Panel [[id number]]  (Abnormal) Collected: 09/03/22 1023    Specimen: Blood Updated: 09/03/22 1057     Glucose 191 mg/dL      BUN 30 mg/dL      Creatinine 1.76 mg/dL      Sodium 138 mmol/L      Potassium 4.9 mmol/L      Chloride 106 mmol/L      CO2 21.0 mmol/L      Calcium 8.1 mg/dL      BUN/Creatinine Ratio 17.0     Anion Gap 11.0 mmol/L      eGFR 30.1 mL/min/1.73      Comment: National " Kidney Foundation and American Society of Nephrology (ASN) Task Force recommended calculation based on the Chronic Kidney Disease Epidemiology Collaboration (CKD-EPI) equation refit without adjustment for race.       Narrative:      GFR Normal >60  Chronic Kidney Disease <60  Kidney Failure <15      Blood Culture - Blood, Hand, Right [[id number]] Collected: 09/03/22 1023    Specimen: Blood from Hand, Right Updated: 09/03/22 1032    POC Glucose Once [[id number]]  (Abnormal) Collected: 09/03/22 1021    Specimen: Blood Updated: 09/03/22 1022     Glucose 190 mg/dL      Comment: Serial Number: [id number]:  [id number]       POC Glucose Once [[id number]]  (Abnormal) Collected: 09/03/22 0916    Specimen: Blood Updated: 09/03/22 0917     Glucose 150 mg/dL      Comment: Serial Number: [id number]:  [id number]       Basic Metabolic Panel [[id number]]  (Abnormal) Collected: 09/03/22 0714    Specimen: Blood Updated: 09/03/22 0830     Glucose 165 mg/dL      BUN 32 mg/dL      Creatinine 1.77 mg/dL      Sodium 137 mmol/L      Potassium 4.7 mmol/L      Comment: Slight hemolysis detected by analyzer. Results may be affected.        Chloride 105 mmol/L      CO2 21.0 mmol/L      Calcium 8.5 mg/dL      BUN/Creatinine Ratio 18.1     Anion Gap 11.0 mmol/L      eGFR 29.9 mL/min/1.73      Comment: National Kidney Foundation and American Society of Nephrology (ASN) Task Force recommended calculation based on the Chronic Kidney Disease Epidemiology Collaboration (CKD-EPI) equation refit without adjustment for race.       Narrative:      GFR Normal >60  Chronic Kidney Disease <60  Kidney Failure <15      Urinalysis, Microscopic Only - Urine, Clean Catch [[id number]]  (Abnormal) Collected: 09/03/22 0714    Specimen: Urine, Clean Catch Updated: 09/03/22 0824     RBC, UA 0-2 /HPF      WBC, UA Too Numerous to Count /HPF      Bacteria, UA None Seen /HPF      Squamous Epithelial Cells, UA 0-2 /HPF      Hyaline Casts, UA None Seen /LPF       Methodology Automated Microscopy    Urinalysis With Culture If Indicated - Urine, Clean Catch [[id number]]  (Abnormal) Collected: 09/03/22 0714    Specimen: Urine, Clean Catch Updated: 09/03/22 0824     Color, UA Yellow     Appearance, UA Clear     pH, UA <=5.0     Specific Gravity, UA 1.012     Glucose,  mg/dL (Trace)     Ketones, UA Negative     Bilirubin, UA Negative     Blood, UA Negative     Protein, UA Trace     Leuk Esterase, UA Moderate (2+)     Nitrite, UA Negative     Urobilinogen, UA 0.2 E.U./dL    Narrative:      In absence of clinical symptoms, the presence of pyuria, bacteria, and/or nitrites on the urinalysis result does not correlate with infection.    Urine Culture - Urine, Urine, Clean Catch [[id number]] Collected: 09/03/22 0714    Specimen: Urine, Clean Catch Updated: 09/03/22 0824    CBC & Differential [[id number]]  (Abnormal) Collected: 09/03/22 0714    Specimen: Blood Updated: 09/03/22 0812    Narrative:      The following orders were created for panel order CBC & Differential.  Procedure                               Abnormality         Status                     ---------                               -----------         ------                     CBC Auto Differential[[id number]]        Abnormal            Final result                 Please view results for these tests on the individual orders.    CBC Auto Differential [[id number]]  (Abnormal) Collected: 09/03/22 0714    Specimen: Blood Updated: 09/03/22 0812     WBC 9.20 10*3/mm3      RBC 3.67 10*6/mm3      Hemoglobin 11.0 g/dL      Hematocrit 34.2 %      MCV 93.4 fL      MCH 29.9 pg      MCHC 32.0 g/dL      RDW 13.6 %      RDW-SD 44.2 fl      MPV 8.3 fL      Platelets 258 10*3/mm3      Neutrophil % 70.1 %      Lymphocyte % 17.2 %      Monocyte % 10.6 %      Eosinophil % 1.6 %      Basophil % 0.5 %      Neutrophils, Absolute 6.50 10*3/mm3      Lymphocytes, Absolute 1.60 10*3/mm3      Monocytes, Absolute 1.00 10*3/mm3      Eosinophils,  Absolute 0.10 10*3/mm3      Basophils, Absolute 0.00 10*3/mm3      nRBC 0.0 /100 WBC     POC Glucose Once [[id number]]  (Abnormal) Collected: 09/03/22 0537    Specimen: Blood Updated: 09/03/22 0541     Glucose 146 mg/dL      Comment: Serial Number: [id number]:  [id number]       POC Glucose Once [[id number]]  (Abnormal) Collected: 09/02/22 2032    Specimen: Blood Updated: 09/02/22 2033     Glucose 200 mg/dL      Comment: Serial Number: [id number]:  [id number]       Blood Culture - Blood, Arm, Left [[id number]]  (Normal) Collected: 09/01/22 1848    Specimen: Blood from Arm, Left Updated: 09/02/22 1902     Blood Culture No growth at 24 hours    Blood Culture - Blood, Arm, Right [[id number]]  (Normal) Collected: 09/01/22 1848    Specimen: Blood from Arm, Right Updated: 09/02/22 1902     Blood Culture No growth at 24 hours    POC Glucose Once [[id number]]  (Abnormal) Collected: 09/02/22 1707    Specimen: Blood Updated: 09/02/22 1708     Glucose 273 mg/dL      Comment: Serial Number: [id number]:  [id number]             Imaging Results (Last 24 Hours)     Procedure Component Value Units Date/Time    FL C Arm During Surgery [[id number]] Resulted: 09/03/22 0900     Updated: 09/03/22 0916    XR Ankle 2 View Left [[id number]] Resulted: 09/03/22 0900     Updated: 09/03/22 0900          EKG      I personally viewed and interpreted the patient's EKG/Telemetry data:    ECHOCARDIOGRAM:    STRESS MYOVIEW:    CARDIAC CATHETERIZATION:    OTHER:         Assessment & Plan     Principal Problem:    Syncope  Active Problems:    Hypoglycemia    Type 2 diabetes mellitus with diabetic nephropathy (HCC)    Bradycardia    Closed trimalleolar fracture of left ankle    Hyperlipidemia    GERD without esophagitis    Acute UTI (urinary tract infection)    Closed trimalleolar fracture of left ankle, initial encounter       Patient presented with a syncopal episode but was probably schedule hypoglycemia  Patient is on a monitor and does  not have any arrhythmias  Patient had ankle fracture for which she had surgery today  Patient is currently stable and will be discharged to home and followed as an outpatient    I discussed the patients findings and my recommendations with patient and nurse    Cesar Ellis MD  09/03/22  12:17 EDT

## 2022-09-03 NOTE — ANESTHESIA PROCEDURE NOTES
Airway  Urgency: elective    Date/Time: 9/3/2022 8:06 AM  Airway not difficult    General Information and Staff    Patient location during procedure: OR  Anesthesiologist: Mark Saldana MD  CRNA/CAA: Earl Rao CAA    Indications and Patient Condition  Indications for airway management: airway protection    Preoxygenated: yes  MILS not maintained throughout  Mask difficulty assessment: 0 - not attempted    Final Airway Details  Final airway type: supraglottic airway      Successful airway: unique  Size 4    Number of attempts at approach: 1  Assessment: lips, teeth, and gum same as pre-op and atraumatic intubation

## 2022-09-03 NOTE — ANESTHESIA PREPROCEDURE EVALUATION
Anesthesia Evaluation     Patient summary reviewed and Nursing notes reviewed   NPO Solid Status: > 6 hours  NPO Liquid Status: > 6 hours           Airway   Mallampati: II  TM distance: >3 FB  Neck ROM: full  No difficulty expected  Dental - normal exam     Pulmonary - negative pulmonary ROS and normal exam    breath sounds clear to auscultation  Cardiovascular - normal exam    ECG reviewed  Rhythm: regular  Rate: normal    (+) hypertension, hyperlipidemia,       Neuro/Psych  (+) syncope,    GI/Hepatic/Renal/Endo    (+)  GERD,  renal disease, diabetes mellitus,     Musculoskeletal     Abdominal  - normal exam    Abdomen: soft.  Bowel sounds: normal.   Substance History - negative use     OB/GYN negative ob/gyn ROS         Other   arthritis,                      Anesthesia Plan    ASA 3     general with block     intravenous induction     Anesthetic plan, risks, benefits, and alternatives have been provided, discussed and informed consent has been obtained with: patient.    Use of blood products discussed with patient .   Plan discussed with CAA.        CODE STATUS:    Level Of Support Discussed With: Patient  Code Status (Patient has no pulse and is not breathing): CPR (Attempt to Resuscitate)  Medical Interventions (Patient has pulse or is breathing): Full Support

## 2022-09-03 NOTE — SIGNIFICANT NOTE
09/03/22 0950   OTHER   Discipline physical therapist   Rehab Time/Intention   Session Not Performed patient unavailable for evaluation;other (see comments)  (ANKLE OPEN REDUCTION INTERNAL FIXATION 9/3/22. Will attempt tomorrow per pt status.)   Recommendation   PT - Next Appointment 09/04/22

## 2022-09-03 NOTE — PLAN OF CARE
Problem: Fall Injury Risk  Goal: Absence of Fall and Fall-Related Injury  Outcome: Ongoing, Progressing  Intervention: Identify and Manage Contributors  Recent Flowsheet Documentation  Taken 9/2/2022 2000 by An Powell LPN  Medication Review/Management: medications reviewed  Taken 9/2/2022 1933 by An Powell LPN  Self-Care Promotion:   BADL personal objects within reach   independence encouraged  Intervention: Promote Injury-Free Environment  Recent Flowsheet Documentation  Taken 9/2/2022 2200 by An Powell LPN  Safety Promotion/Fall Prevention:   safety round/check completed   fall prevention program maintained  Taken 9/2/2022 2000 by An Powell LPN  Safety Promotion/Fall Prevention:   safety round/check completed   room organization consistent   nonskid shoes/slippers when out of bed   fall prevention program maintained   clutter free environment maintained   activity supervised   assistive device/personal items within reach     Problem: Adult Inpatient Plan of Care  Goal: Plan of Care Review  Outcome: Ongoing, Progressing  Flowsheets (Taken 9/3/2022 0638)  Progress: improving  Plan of Care Reviewed With:   patient   spouse  Goal: Patient-Specific Goal (Individualized)  Outcome: Ongoing, Progressing  Goal: Absence of Hospital-Acquired Illness or Injury  Outcome: Ongoing, Progressing  Intervention: Identify and Manage Fall Risk  Recent Flowsheet Documentation  Taken 9/2/2022 2200 by An Powell LPN  Safety Promotion/Fall Prevention:   safety round/check completed   fall prevention program maintained  Taken 9/2/2022 2000 by An Powell LPN  Safety Promotion/Fall Prevention:   safety round/check completed   room organization consistent   nonskid shoes/slippers when out of bed   fall prevention program maintained   clutter free environment maintained   activity supervised   assistive device/personal items within reach  Intervention: Prevent Skin Injury  Recent Flowsheet  Documentation  Taken 9/2/2022 2200 by An Powell LPN  Body Position: weight shifting  Taken 9/2/2022 2000 by An Powell LPN  Body Position: weight shifting  Taken 9/2/2022 1933 by An Powell LPN  Skin Protection:   adhesive use limited   tubing/devices free from skin contact   incontinence pads utilized  Intervention: Prevent and Manage VTE (Venous Thromboembolism) Risk  Recent Flowsheet Documentation  Taken 9/2/2022 2000 by An Powell LPN  Activity Management: activity adjusted per tolerance  Taken 9/2/2022 1933 by An Powell LPN  VTE Prevention/Management: (See MAR) other (see comments)  Intervention: Prevent Infection  Recent Flowsheet Documentation  Taken 9/2/2022 2200 by An Powell LPN  Infection Prevention: environmental surveillance performed  Taken 9/2/2022 2000 by An Powell LPN  Infection Prevention:   hand hygiene promoted   personal protective equipment utilized   rest/sleep promoted  Goal: Optimal Comfort and Wellbeing  Outcome: Ongoing, Progressing  Intervention: Provide Person-Centered Care  Recent Flowsheet Documentation  Taken 9/2/2022 1933 by An Powell LPN  Trust Relationship/Rapport:   care explained   thoughts/feelings acknowledged  Goal: Readiness for Transition of Care  Outcome: Ongoing, Progressing     Problem: Skin Injury Risk Increased  Goal: Skin Health and Integrity  Outcome: Ongoing, Progressing  Intervention: Optimize Skin Protection  Recent Flowsheet Documentation  Taken 9/2/2022 2200 by An Powell LPN  Head of Bed (HOB) Positioning: HOB elevated  Taken 9/2/2022 2000 by An Powell LPN  Head of Bed (HOB) Positioning: HOB elevated  Taken 9/2/2022 1933 by An Powell LPN  Pressure Reduction Techniques: frequent weight shift encouraged  Pressure Reduction Devices: pressure-redistributing mattress utilized  Skin Protection:   adhesive use limited   tubing/devices free from skin contact   incontinence pads utilized      Problem: Diabetes Comorbidity  Goal: Blood Glucose Level Within Targeted Range  Outcome: Ongoing, Progressing  Intervention: Monitor and Manage Glycemia  Recent Flowsheet Documentation  Taken 9/2/2022 1933 by An Powell LPN  Glycemic Management: blood glucose monitored     Problem: Hypertension Comorbidity  Goal: Blood Pressure in Desired Range  Outcome: Ongoing, Progressing  Intervention: Maintain Blood Pressure Management  Recent Flowsheet Documentation  Taken 9/2/2022 2000 by An Powell LPN  Medication Review/Management: medications reviewed  Taken 9/2/2022 1933 by An Powell LPN  Syncope Management:   legs elevated   position changed slowly     Problem: Bleeding (Orthopaedic Fracture)  Goal: Absence of Bleeding  Outcome: Ongoing, Progressing  Intervention: Monitor and Manage Fracture Bleeding  Recent Flowsheet Documentation  Taken 9/2/2022 1933 by An Powell LPN  Fracture Immobilization: supported with pillows     Problem: Embolism (Orthopaedic Fracture)  Goal: Absence of Embolism Signs and Symptoms  Outcome: Ongoing, Progressing  Intervention: Prevent or Manage Embolism Risk  Recent Flowsheet Documentation  Taken 9/2/2022 1933 by An Powell LPN  VTE Prevention/Management: (See MAR) other (see comments)     Problem: Fracture Stabilization and Management (Orthopaedic Fracture)  Goal: Fracture Stability  Outcome: Ongoing, Progressing  Intervention: Promote Fracture Stability and Healing  Recent Flowsheet Documentation  Taken 9/2/2022 1933 by An Powell LPN  Fracture Immobilization: supported with pillows     Problem: Functional Ability Impaired (Orthopaedic Fracture)  Goal: Optimal Functional Ability  Outcome: Ongoing, Progressing  Intervention: Optimize Functional Ability  Recent Flowsheet Documentation  Taken 9/2/2022 2200 by An Powell LPN  Positioning/Transfer Devices:   pillows   in use  Taken 9/2/2022 2000 by An Powell LPN  Activity Management:  activity adjusted per tolerance  Activity Assistance Provided: assistance, 1 person  Positioning/Transfer Devices:   pillows   in use  Taken 9/2/2022 1933 by An Powell LPN  Self-Care Promotion:   BADL personal objects within reach   independence encouraged     Problem: Infection (Orthopaedic Fracture)  Goal: Absence of Infection Signs and Symptoms  Outcome: Ongoing, Progressing  Intervention: Prevent or Manage Infection  Recent Flowsheet Documentation  Taken 9/2/2022 2200 by An Powell LPN  Infection Prevention: environmental surveillance performed  Taken 9/2/2022 2000 by An Powell LPN  Infection Prevention:   hand hygiene promoted   personal protective equipment utilized   rest/sleep promoted     Problem: Neurovascular Compromise (Orthopaedic Fracture)  Goal: Effective Tissue Perfusion  Outcome: Ongoing, Progressing     Problem: Pain (Orthopaedic Fracture)  Goal: Acceptable Pain Control  Outcome: Ongoing, Progressing     Problem: Respiratory Compromise (Orthopaedic Fracture)  Goal: Effective Oxygenation and Ventilation  Outcome: Ongoing, Progressing  Intervention: Promote Airway Secretion Clearance  Recent Flowsheet Documentation  Taken 9/2/2022 1933 by An Powell LPN  Cough And Deep Breathing: done independently per patient  Intervention: Optimize Oxygenation and Ventilation  Recent Flowsheet Documentation  Taken 9/2/2022 1933 by An Powell LPN  Fluid/Electrolyte Management: fluids provided   Goal Outcome Evaluation:  Plan of Care Reviewed With: patient, spouse        Progress: improving

## 2022-09-03 NOTE — OP NOTE
ANKLE OPEN REDUCTION INTERNAL FIXATION  Procedure Report    Patient Name:  Neeta Harrell  YOB: 1945    Date of Surgery:  9/3/2022         Pre-op Diagnosis: Left trimalleolar ankle fracture    Postop diagnosis: Same    Indications:   76-year-old white female who fell and sustained a displaced fracture.  Indicated to reduce and stabilize this to prevent malunion      Procedure/CPT® Codes:      Procedure(s):  Open reduction and fixation left trimalleolar ankle fracture without posterior fixation    Staff:  Surgeon(s):  Tyrell Galarza MD         Anesthesia: General with Block    Estimated Blood Loss: 0    Implants:    Implant Name Type Inv. Item Serial No.  Lot No. LRB No. Used Action   PLT FIB ORTHOLOC 3DI OFFST LAT 125MM LT - [id number] Implant PLT FIB ORTHOLOC 3DI OFFST LAT 125MM LT  [employer] . Left 1 Implanted   SCRW ANNE-MARIE NL ORTHOLOC LP 2.7X12MM - [id number] Implant SCRW ANNE-MARIE NL ORTHOLOC LP 2.7X12MM  [employer] . Left 1 Implanted   SCRW LK ORTHOLOC 3DI 3.5X12MM - [id number] Implant SCRW LK ORTHOLOC 3DI 3.5X12MM  [employer] . Left 1 Implanted   SCRW LK ORTHOLOC 3DI 3.5X16MM - [id number] Implant SCRW LK ORTHOLOC 3DI 3.5X16MM  [employer] . Left 1 Implanted   SCRW LK ORTHOLOC 3DI 3.5X18MM - [id number] Implant SCRW LK ORTHOLOC 3DI 3.5X18MM  [employer] . Left 1 Implanted   SCRW ANNE-MARIE NL ORTHOLOC LP  3.5X12MM - [id number] Implant SCRW ANNE-MARIE NL ORTHOLOC LP  3.5X12MM  [employer] . Left 1 Implanted   SCRW ANNE-MARIE ORTHOLOC 3DI 3.5X12MM - [id number] Implant SCRW ANNE-MARIE ORTHOLOC 3DI 3.5X12MM  [employer] . Left 1 Implanted   SCRW DARTFIRE HD 4X36MM - [id number] Implant SCRW DARTFIRE HD 4X36MM  [employer] . Left 1 Implanted       Specimen:          0        Findings: Displaced Lawson C fracture with a transverse medial malleolar fracture displaced and a small minimally displaced posterior malleolar fracture    Complications:  None    Description of Procedure: Patient was seen in holding room and consented by me.  At the operative extremity initialed by me.  Preoperative antibiotics 2 g Kefzol given.  Supine positioning with anesthesia a bump under the operative hip.  The operative extremity was prepped and draped sterilely.  Tourniquet was inflated in the upper thigh to 250 mmHg.  Total tourniquet time was approximately 40 minutes.  The lateral incision was made along the lateral malleolus from the tip proximally.  Dissection was done to the fracture site avoiding tendon and neurovascular structures.  The fracture site was cleaned out and reduced with clamps.  A lag screw was placedusing standard lag technique with a Saez 2.7 mm cortical screw.  The Saez left lateral offset titanium plate with multiple holes was bent to match the shape of the lateral malleolus.  This is anchored proximally with a 3.5 mm cortical screw then a 3.5 mm cortical screw placed distally and additional 3.5 mm cortical screw proximally and 2 locking screws distally and 1 more locking screw proximally.  The medial side was exposed through a 4 cm longitudinal incision.  Dissection was then down to the fracture avoiding saphenous nerve and vein.  Fracture site was cleaned out and reduced and held with a clamp.  The guidewire for the Saez 4.0 cannulated screw system was used placing a 4 mm short threaded partially-threaded screws 36 length.      Patient was tested for stability fluoroscopically final C-arm views were saved.  The wounds were irrigated.  Tourniquet released.  Subcu tissue was closed with 2-0 Vicryl and skin with staples.  Wound was infiltrated with 20 mL of 1% lidocaine with epinephrine patient was left stable in the OR.  Placed in sterile dressing and a posterior splint in neutral dorsiflexion.    Plan is to be toe-touch weightbearing for 6 weeks with staple removal in 10 to 12 days.      Tyrell Galarza MD     Date: 9/3/2022  Time: 09:17  EDT

## 2022-09-03 NOTE — THERAPY EVALUATION
Patient Name: Neeta Harrell  : 1945    MRN: [id number]                              Today's Date: 9/3/2022       Admit Date: 2022    Visit Dx:     ICD-10-CM ICD-9-CM   1. Closed trimalleolar fracture of left ankle, initial encounter  S82.852A 824.6   2. Hypoglycemia  E16.2 251.2   3. Bradycardia  R00.1 427.89     Patient Active Problem List   Diagnosis   • Trigeminy   • Hypoglycemia   • Osteoporosis   • Type 2 diabetes mellitus with diabetic nephropathy (HCC)   • Bradycardia   • Syncope   • Closed trimalleolar fracture of left ankle   • Hyperlipidemia   • GERD without esophagitis   • Acute UTI (urinary tract infection)   • Closed trimalleolar fracture of left ankle, initial encounter     Past Medical History:   Diagnosis Date   • Arthritis    • CKD (chronic kidney disease)    • Diabetes mellitus (HCC)    • Hyperlipidemia    • Hypertension      Past Surgical History:   Procedure Laterality Date   •  SECTION      x2   • CHOLECYSTECTOMY     • HYSTERECTOMY     • INCONTINENCE SURGERY     • TONSILLECTOMY        General Information     Row Name 22 1427          Physical Therapy Time and Intention    Document Type evaluation  -HS     Mode of Treatment individual therapy  -     Row Name 22 1427          General Information    Patient Profile Reviewed yes  -HS     Prior Level of Function independent:;ADL's;community mobility;all household mobility;gait  -HS     Existing Precautions/Restrictions fall;weight bearing  TTWB on the L LE  -HS     Barriers to Rehab physical barrier  -HS     Row Name 22 142          Living Environment    People in Home spouse  Daughter will be assisting; Grandson available to assist  -     Name(s) of People in Home Daughter Jaylene;  Tyrell  -     Row Name 22 1427          Home Main Entrance    Number of Stairs, Main Entrance two  -HS     Stair Railings, Main Entrance railing on right side (ascending)  -HS     Row Name 22 1427           Stairs Within Home, Primary    Number of Stairs, Within Home, Primary none  -HS     Row Name 09/03/22 1427          Cognition    Orientation Status (Cognition) oriented x 4  -HS     Row Name 09/03/22 1427          Safety Issues, Functional Mobility    Safety Issues Affecting Function (Mobility) positioning of assistive device;sequencing abilities  -HS     Impairments Affecting Function (Mobility) balance;endurance/activity tolerance;strength  -HS           User Key  (r) = Recorded By, (t) = Taken By, (c) = Cosigned By    Initials Name Provider Type    HS Shantell Vasquez PT Physical Therapist               Mobility     Row Name 09/03/22 1430          Bed Mobility    Bed Mobility supine-sit-supine;scooting/bridging  -HS     Scooting/Bridging [place] (Bed Mobility) maximum assist (25% patient effort);2 person assist;other (see comments)  drawsheet transfer  -HS     Supine-Sit-Supine [place] (Bed Mobility) minimum assist (75% patient effort);moderate assist (50% patient effort)  -HS     Assistive Device (Bed Mobility) bed rails  -HS     Comment, (Bed Mobility) Pt requires min A for management of L LE  -HS     Row Name 09/03/22 1430          Transfers    Comment, (Transfers) STS transfers practiced from EOB to bedside chair x3. Pt requires assist ranging from min-max A to stand; increased assist required when pt becomes fatigued. Significant education provided to family on safe liftng mechanics, transfer setup, problem solving and trouble shooting transfers, and safe use of adaptive equipment and assitive devices. Transfers practiced with patient and therapist, with therapist demonstrating transfers with patient and family memebers. Pt with difficulty maintaining TTWB on L LE when fatigued.  -     Row Name 09/03/22 1430          Bed-Chair Transfer    Bed-Chair [place] (Transfers) minimum assist (75% patient effort);moderate assist (50% patient effort);maximum assist (25% patient effort);1 person  assist;2 person assist  -     Row Name 09/03/22 1430          Sit-Stand Transfer    Sit-Stand [place] (Transfers) minimum assist (75% patient effort);moderate assist (50% patient effort);maximum assist (25% patient effort);1 person assist;2 person assist  Varied w patient fatigue  -     Comment, (Sit-Stand Transfer) Requires consistent cueing for safety mechanics, form, and control of descent into chair.  -     Row Name 09/03/22 1430          Gait/Stairs (Locomotion)    [place] Level (Gait) not tested;other (see comments)  Pt with difficulty maintaining WB precautions on L LE when fatigued; too tired to walk after transfersx 3  -HS     Comment, (Gait/Stairs) Increased education to family provided on management of stairs with family assist; pt unsafe for stair mobilityat this time after mobiliy assessment and education on WC transfers.  -           User Key  (r) = Recorded By, (t) = Taken By, (c) = Cosigned By    Initials Name Provider Type     Shantell Vasquez PT Physical Therapist               Obj/Interventions     Row Name 09/03/22 1505          Range of Motion Comprehensive    Comment, General Range of Motion B UE WFL; L foot immobilized in boot but remaining LE WFL per assessment of functional mobility  -     Row Name 09/03/22 1505          Strength Comprehensive (MMT)    Comment, General Manual Muscle Testing (MMT) Assessment B LE ~3/5 per assessment of functional mobility  -           User Key  (r) = Recorded By, (t) = Taken By, (c) = Cosigned By    Initials Name Provider Type     Shantell Vasquez PT Physical Therapist               Goals/Plan    No documentation.                Clinical Impression     Row Name 09/03/22 1506          Pain    Pretreatment Pain Rating --  no pain due to recent nn block  -     Row Name 09/03/22 1506          Plan of Care Review    Plan of Care Reviewed With patient;spouse;daughter  -     Outcome Evaluation Pt is a 77 yo F who presents to [facility] after a  hypoglycemic episode in which she passed out, fell, and sustained a L trimalleolar fracture. ORIF 9/3/22. Pt will be TTWB for 3 weeks. Pt has assist of , daughter, and family at home. Pt would likely benefit from dc to rehab, but refuses as she has to attend her brother's  on 22. Significant time spent on educating family and patient on safe transfers, proper lifting technique to avoid caregiver injury, safe use of AD and positioning of equipment, transfer problem solving, car transfers, use of WC (including pressure relief devices), mobility around the home, and WB precautions. Written and verbal instructions provided, and therapist demonstrating transfers on patient and family. Family able to practice transfers on patient and therapist. Pt able to transfer x3 from EOB to bedside chair, but requires increased assist with fatigue ranging from min/mod A to max A. Family educated on 2 person assist. Pt will need bedside commode, front wheeled walker upon dc home, and would benefit from  PT and OT.  -HS     Row Name 22 1506          Therapy Assessment/Plan (PT)    Criteria for Skilled Interventions Met (PT) other (see comments)  discharging today  -     Therapy Frequency (PT) evaluation only  -     Row Name 22 1506          Positioning and Restraints    Post Treatment Position bed  -HS     In Bed notified nsg;supine;exit alarm on;encouraged to call for assist;with family/caregiver  -           User Key  (r) = Recorded By, (t) = Taken By, (c) = Cosigned By    Initials Name Provider Type     Shantell Vasquez, PT Physical Therapist               Outcome Measures     Row Name 22 1015          How much help from another person do you currently need...    Turning from your back to your side while in flat bed without using bedrails? 4  -TA     Moving from lying on back to sitting on the side of a flat bed without bedrails? 3  -TA     Moving to and from a bed to a chair (including a  wheelchair)? 2  -TA     Standing up from a chair using your arms (e.g., wheelchair, bedside chair)? 2  -TA     Climbing 3-5 steps with a railing? 1  -TA     To walk in hospital room? 2  -TA     AM-PAC 6 Clicks Score (PT) 14  -TA           User Key  (r) = Recorded By, (t) = Taken By, (c) = Cosigned By    Initials Name Provider Type    TA Jyoti Chauhan, RN Registered Nurse                             Physical Therapy Education                 Title: PT OT SLP Therapies (Done)     Topic: Physical Therapy (Done)     Point: Mobility training (Done)     Learning Progress Summary           Patient Acceptance, E,TB,D, VU,NR,DU,Bed IU by  at 9/3/2022 1513                   Point: Home exercise program (Done)     Learning Progress Summary           Patient Acceptance, E,TB,D, VU,NR,DU,Bed IU by  at 9/3/2022 1513                   Point: Body mechanics (Done)     Learning Progress Summary           Patient Acceptance, E,TB,D, VU,NR,DU,Bed IU by  at 9/3/2022 1513                   Point: Precautions (Done)     Learning Progress Summary           Patient Acceptance, E,TB,D, VU,NR,DU,Bed IU by  at 9/3/2022 1513                               User Key     Initials Effective Dates Name Provider Type Discipline     21 -  Shantell Vasquez PT Physical Therapist PT              PT Recommendation and Plan     Plan of Care Reviewed With: patient, spouse, daughter  Outcome Evaluation: Pt is a 75 yo F who presents to [facility] after a hypoglycemic episode in which she passed out, fell, and sustained a L trimalleolar fracture. ORIF 9/3/22. Pt will be TTWB for 3 weeks. Pt has assist of , daughter, and family at home. Pt would likely benefit from dc to rehab, but refuses as she has to attend her brother's  on 22. Significant time spent on educating family and patient on safe transfers, proper lifting technique to avoid caregiver injury, safe use of AD and positioning of equipment, transfer problem solving, car  transfers, use of WC (including pressure relief devices), mobility around the home, and WB precautions. Written and verbal instructions provided, and therapist demonstrating transfers on patient and family. Family able to practice transfers on patient and therapist. Pt able to transfer x3 from EOB to bedside chair, but requires increased assist with fatigue ranging from min/mod A to max A. Family educated on 2 person assist. Pt will need bedside commode, front wheeled walker upon dc home, and would benefit from HH PT and OT.     Time Calculation:    PT Charges     Row Name 09/03/22 1514 09/03/22 0950          Time Calculation    Start Time 1224  -HS --     Stop Time 1400  -HS --     Time Calculation (min) 96 min  -HS --     PT Non-Billable Time (min) 26 min  -HS --     PT Received On 09/03/22  - --     PT - Next Appointment -- 09/04/22  -            Time Calculation- PT    Total Timed Code Minutes- PT 70 minute(s)  -HS --           User Key  (r) = Recorded By, (t) = Taken By, (c) = Cosigned By    Initials Name Provider Type     Shantell Vasquez, ARGELIA Physical Therapist              Therapy Charges for Today     Code Description Service Date Service Provider Modifiers Qty    99907609049 HC PT THERAPEUTIC ACT EA 15 MIN 9/3/2022 Shantell Vasquez PT GP 3    15864657319 HC PT SELF CARE/MGMT/TRAIN EA 15 MIN 9/3/2022 Shantell Vasquez PT GP 2    16100263650 HC PT EVAL MOD COMPLEXITY 2 9/3/2022 Shantell Vasquez PT GP 1          PT G-Codes  AM-PAC 6 Clicks Score (PT): 14    Shantell Vasquez PT  9/3/2022

## 2022-09-03 NOTE — PLAN OF CARE
Goal Outcome Evaluation:  Plan of Care Reviewed With: patient, spouse, daughter           Outcome Evaluation: Pt is a 75 yo F who presents to [facility] after a hypoglycemic episode in which she passed out, fell, and sustained a L trimalleolar fracture. ORIF 9/3/22. Pt will be TTWB for 3 weeks. Pt has assist of , daughter, and family at home. Pt would likely benefit from dc to rehab, but refuses as she has to attend her brother's  on 22. Significant time spent on educating family and patient on safe transfers, proper lifting technique to avoid caregiver injury, safe use of AD and positioning of equipment, transfer problem solving, car transfers, use of WC (including pressure relief devices), mobility around the home, and WB precautions. Written and verbal instructions provided, and therapist demonstrating transfers on patient and family. Family able to practice transfers on patient and therapist. Pt able to transfer x3 from EOB to bedside chair, but requires increased assist with fatigue ranging from min/mod A to max A. Family educated on 2 person assist. Pt will need bedside commode, front wheeled walker upon dc home, and would benefit from HH PT and OT.

## 2022-09-03 NOTE — ANESTHESIA PROCEDURE NOTES
Peripheral Block      Patient reassessed immediately prior to procedure    Patient location during procedure: pre-op  Reason for block: procedure for pain, at surgeon's request and post-op pain management  Performed by  Anesthesiologist: Mark Saldana MD  Assisted by: Reed Castro RN  Preanesthetic Checklist  Completed: patient identified, IV checked, site marked, risks and benefits discussed, surgical consent, monitors and equipment checked, pre-op evaluation and timeout performed  Prep:  Pt Position: supine  Sterile barriers:cap, gloves, sterile barriers, mask and gown  Prep: ChloraPrep  Patient monitoring: blood pressure monitoring, continuous pulse oximetry and EKG  Procedure    Sedation: yes  Performed under: local infiltration  Guidance:ultrasound guided    ULTRASOUND INTERPRETATION. Using ultrasound guidance a 20 G gauge needle was placed in close proximity to the nerve, at which point, under ultrasound guidance anesthetic was injected in the area of the nerve and spread of the anesthesia was seen on ultrasound in close proximity thereto.  There were no abnormalities seen on ultrasound; a digital image was taken; and the patient tolerated the procedure with no complications. Images:still images obtained, printed/placed on chart    Laterality:left  Block Type:popliteal and adductor canal block  Injection Technique:single-shot  Needle Type:echogenic  Needle Gauge:20 G  Resistance on Injection: less than 15 psi  Sedation medications used: midazolam (VERSED) injection, 2 mg  Medications Used: dexamethasone (DECADRON) injection, 8 mg  ropivacaine (NAROPIN) 0.5 % injection, 60 mL  Med administered at 9/3/2022 7:55 AM      Medications  Comment:30 cc used per block  US used to see nerve needle and spread of local anesthetic    Post Assessment  Injection Assessment: negative aspiration for heme, no paresthesia on injection and incremental injection  Patient Tolerance:comfortable throughout  block  Complications:no

## 2022-09-03 NOTE — PLAN OF CARE
Pt has been pleasant & cooperative. Pt had an ORIF of the left ankle today. Pt's daughter & spouse have remained at the bedside throughout the day. Pt is currently finishing up an IV antibiotic. Pt remains on room air. Plan is for pt to discharge home today with home health. Will continue to monitor.

## 2022-09-03 NOTE — PROGRESS NOTES
[facility] Medicine Services Daily Progress Note    Patient Name: Neeta Harrell  : 1945  MRN: [id number]  Primary Care Physician:  Landon Smith MD  Date of admission: 2022      Subjective      Chief Complaint: Leg pain      Patient Reports     22: Left leg pain.  Will order Xanax for anxiety.    9/3/22: Planned ankle repair today.    Review of Systems   All other systems reviewed and are negative.        Objective      Vitals:   Temp:  [97.7 °F (36.5 °C)-98.3 °F (36.8 °C)] 97.7 °F (36.5 °C)  Heart Rate:  [60-98] 82  Resp:  [11-21] 15  BP: (149-189)/(45-72) 149/57  Flow (L/min):  [2-4] 2    Physical Exam  HENT:      Head: Normocephalic.      Nose: Nose normal.   Eyes:      Extraocular Movements: Extraocular movements intact.      Pupils: Pupils are equal, round, and reactive to light.   Cardiovascular:      Rate and Rhythm: Normal rate and regular rhythm.   Pulmonary:      Effort: Pulmonary effort is normal.   Abdominal:      General: Bowel sounds are normal.   Musculoskeletal:      Cervical back: Normal range of motion.      Comments: Decreased range of motion left leg   Skin:     General: Skin is warm.   Neurological:      Mental Status: She is alert. Mental status is at baseline.   Psychiatric:         Mood and Affect: Mood normal.             Result Review    Result Review:  I have personally reviewed the results from the time of this admission to 9/3/2022 10:09 EDT and agree with these findings:  [x]  Laboratory  []  Microbiology  [x]  Radiology  []  EKG/Telemetry   []  Cardiology/Vascular   []  Pathology  [x]  Old records  []  Other:      Wounds (last 24 hours)     LDA Wound     Row Name 22 0957 22 0943 22 0928       Wound 22 0834 Left lateral ankle Incision    Wound - Properties Group Placement Date: 22  -JY Placement Time: 834  -J Present on Hospital Admission: N  -JY Side: Left  -JY Orientation: lateral  -JY Location: ankle   -JY Primary Wound Type: Incision  -JY    Dressing Appearance dry;intact;no drainage  -AV dry;intact;no drainage  -AV dry;intact;no drainage  -AV    Closure KATHERINE  -AV KATHERINE  -AV KATHERINE  -AV    Retired Wound - Properties Group Placement Date: 09/03/22 -JY Placement Time: 0834 -JY Present on Hospital Admission: N  -JY Side: Left  -JY Orientation: lateral  -JY Location: ankle  -JY Primary Wound Type: Incision  -JY    Retired Wound - Properties Group Date first assessed: 09/03/22 -JY Time first assessed: 0834 -JY Present on Hospital Admission: N  -JY Side: Left  -Y Location: ankle  -JY Primary Wound Type: Incision  -JY    Row Name 09/03/22 0913 09/03/22 0854          Wound 09/03/22 0834 Left lateral ankle Incision    Wound - Properties Group Placement Date: 09/03/22 -JY Placement Time: 0834 -JY Present on Hospital Admission: N  -JY Side: Left  -Y Orientation: lateral  -Y Location: ankle  -JY Primary Wound Type: Incision  -JY     Dressing Appearance dry;intact;no drainage  -AV --     Closure KATHERINE  -AV --     Dressing Care -- dressing applied  xeroform,4x4's, webril, orthoglass splint, ace  -JY     Retired Wound - Properties Group Placement Date: 09/03/22 -JY Placement Time: 0834 -JY Present on Hospital Admission: N  -JY Side: Left  -JY Orientation: lateral  -JY Location: ankle  -JY Primary Wound Type: Incision  -JY     Retired Wound - Properties Group Date first assessed: 09/03/22 -JY Time first assessed: 0834 -JY Present on Hospital Admission: N  -JY Side: Left  -JY Location: ankle  -JY Primary Wound Type: Incision  -JY           User Key  (r) = Recorded By, (t) = Taken By, (c) = Cosigned By    Initials Name Provider Type    Amina Jarvis RN Registered Nurse    Aisha Daugherty RN Registered Nurse                  Assessment & Plan      Brief Patient Summary:      amLODIPine, 10 mg, Oral, Daily  [MAR Hold] atorvastatin, 40 mg, Oral, Daily  cefTRIAXone, 2 g, Intravenous, Q24H  [MAR Hold] enoxaparin, 30  mg, Subcutaneous, Daily  [MAR Hold] ferrous sulfate, 324 mg, Oral, Daily With Breakfast  [MAR Hold] insulin glargine, 18 Units, Subcutaneous, Nightly  [MAR Hold] insulin lispro, 0-7 Units, Subcutaneous, TID AC  losartan, 100 mg, Oral, Daily  [MAR Hold] pantoprazole, 40 mg, Oral, BID AC  [MAR Hold] sodium bicarbonate, 650 mg, Oral, TID  [MAR Hold] sodium chloride, 10 mL, Intravenous, Q12H       sodium chloride, 100 mL/hr, Last Rate: 100 mL/hr (09/03/22 0509)  sodium chloride, 75 mL/hr         Active Hospital Problems:  Active Hospital Problems    Diagnosis    • **Syncope    • Closed trimalleolar fracture of left ankle, initial encounter    • Hypoglycemia    • Bradycardia    • Closed trimalleolar fracture of left ankle    • Hyperlipidemia    • GERD without esophagitis    • Acute UTI (urinary tract infection)    • Type 2 diabetes mellitus with diabetic nephropathy (HCC)      Syncope due to hypoglycemia complicated with trimalleolar fracture:  -s/p CT head /cervical spine in ED  -Orthopedic surgery consulted--> plan for repair 9/3/2022    UTI:  -Continue Rocephin    Anxiety:  -Xanax PRN    Type 2 diabetes mellitus:  -Continue Lantus at reduced dose and ISS  -Hold home Januvia    CKD stage IV:  -Nephrotoxin holds  -Continue sodium bicarb    Hypertension:  -Continue home Norvasc, Coreg and Cozaar when blood pressure appropriate    Hyperlipidemia:  -Continue Lipitor    GERD:  -Protonix    Normocytic anemia:  -Monitor H&H        DVT prophylaxis:  Medical DVT prophylaxis orders are present.    CODE STATUS:    Level Of Support Discussed With: Patient  Code Status (Patient has no pulse and is not breathing): CPR (Attempt to Resuscitate)  Medical Interventions (Patient has pulse or is breathing): Full Support      Disposition:  I expect patient to be discharged defer.    This patient has been examined wearing appropriate Personal Protective Equipment and discussed with nursing. 09/03/22      Electronically signed by Elder salazar  DO [place], 09/03/22, 10:09 EDT.  [facility] Hospitalist Team

## 2022-09-04 ENCOUNTER — READMISSION MANAGEMENT (OUTPATIENT)
Dept: CALL CENTER | Facility: HOSPITAL | Age: 77
End: 2022-09-04

## 2022-09-04 VITALS
HEIGHT: 65 IN | BODY MASS INDEX: 33.15 KG/M2 | HEART RATE: 65 BPM | SYSTOLIC BLOOD PRESSURE: 150 MMHG | TEMPERATURE: 97.8 F | RESPIRATION RATE: 18 BRPM | WEIGHT: 199 LBS | DIASTOLIC BLOOD PRESSURE: 70 MMHG | OXYGEN SATURATION: 98 %

## 2022-09-04 PROBLEM — R00.1 BRADYCARDIA: Status: RESOLVED | Noted: 2022-09-01 | Resolved: 2022-09-04

## 2022-09-04 PROBLEM — R55 SYNCOPE: Status: RESOLVED | Noted: 2022-09-01 | Resolved: 2022-09-04

## 2022-09-04 PROBLEM — E16.2 HYPOGLYCEMIA: Status: RESOLVED | Noted: 2022-09-01 | Resolved: 2022-09-04

## 2022-09-04 LAB
ANION GAP SERPL CALCULATED.3IONS-SCNC: 13 MMOL/L (ref 5–15)
BACTERIA SPEC AEROBE CULT: ABNORMAL
BUN SERPL-MCNC: 33 MG/DL (ref 8–23)
BUN/CREAT SERPL: 18.2 (ref 7–25)
CALCIUM SPEC-SCNC: 7.7 MG/DL (ref 8.6–10.5)
CHLORIDE SERPL-SCNC: 100 MMOL/L (ref 98–107)
CO2 SERPL-SCNC: 18 MMOL/L (ref 22–29)
CREAT SERPL-MCNC: 1.81 MG/DL (ref 0.57–1)
EGFRCR SERPLBLD CKD-EPI 2021: 29.1 ML/MIN/1.73
GLUCOSE BLDC GLUCOMTR-MCNC: 246 MG/DL (ref 70–105)
GLUCOSE BLDC GLUCOMTR-MCNC: 255 MG/DL (ref 70–105)
GLUCOSE SERPL-MCNC: 345 MG/DL (ref 65–99)
HCT VFR BLD AUTO: 30.2 % (ref 34–46.6)
HGB BLD-MCNC: 9.9 G/DL (ref 12–15.9)
POTASSIUM SERPL-SCNC: 5.3 MMOL/L (ref 3.5–5.2)
SODIUM SERPL-SCNC: 131 MMOL/L (ref 136–145)

## 2022-09-04 PROCEDURE — 80048 BASIC METABOLIC PNL TOTAL CA: CPT | Performed by: ORTHOPAEDIC SURGERY

## 2022-09-04 PROCEDURE — 99238 HOSP IP/OBS DSCHRG MGMT 30/<: CPT | Performed by: HOSPITALIST

## 2022-09-04 PROCEDURE — 85018 HEMOGLOBIN: CPT | Performed by: ORTHOPAEDIC SURGERY

## 2022-09-04 PROCEDURE — 63710000001 INSULIN LISPRO (HUMAN) PER 5 UNITS: Performed by: ORTHOPAEDIC SURGERY

## 2022-09-04 PROCEDURE — 82962 GLUCOSE BLOOD TEST: CPT

## 2022-09-04 PROCEDURE — 85014 HEMATOCRIT: CPT | Performed by: ORTHOPAEDIC SURGERY

## 2022-09-04 RX ORDER — CARVEDILOL 6.25 MG/1
6.25 TABLET ORAL 2 TIMES DAILY WITH MEALS
Qty: 60 TABLET | Refills: 0 | Status: SHIPPED | OUTPATIENT
Start: 2022-09-04

## 2022-09-04 RX ADMIN — PANTOPRAZOLE SODIUM 40 MG: 40 TABLET, DELAYED RELEASE ORAL at 08:01

## 2022-09-04 RX ADMIN — INSULIN LISPRO 4 UNITS: 100 INJECTION, SOLUTION INTRAVENOUS; SUBCUTANEOUS at 08:01

## 2022-09-04 RX ADMIN — OXYCODONE 10 MG: 5 TABLET ORAL at 03:06

## 2022-09-04 RX ADMIN — ACETAMINOPHEN 650 MG: 325 TABLET, FILM COATED ORAL at 08:03

## 2022-09-04 NOTE — DISCHARGE SUMMARY
[facility]  DISCHARGE SUMMARY    Patient Name: Neeta Harrell  : 1945  MRN: [id number]    Date of Admission: 2022  Date of Discharge:  2022  Primary Care Physician: Landon Smith MD      Presenting Problem:   Bradycardia [R00.1]  Hypoglycemia [E16.2]  Closed trimalleolar fracture of left ankle, initial encounter [S82.852A]    Active and Resolved Hospital Problems:  Active Hospital Problems    Diagnosis POA   • Closed trimalleolar fracture of left ankle [S82.852A] Yes     Priority: High   • Closed trimalleolar fracture of left ankle, initial encounter [S82.852A] Yes     Priority: Medium   • GERD without esophagitis [K21.9] Yes     Priority: Medium   • 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  The patient was brought to the emergency room on 2022 after she had a syncopal episode at home.  Apparently her blood glucose was low and admits to not eating despite being on insulin.  She was at a flower shop picking out flower arrangements for a  when this happened.    X-ray of the left ankle in the ED showed trimalleolar fracture.  She was then admitted to the medical floor on antibiotics to treat UTI and orthopedic surgeon was consulted.    Cardiology was consulted for work-up of syncope.  Apparently her heart rate was reported to have been in the 40s to 50s while the patient was hypoglycemic.  Cardiology recommended decreasing her beta-blocker.  The patient underwent open reduction and  fixation of left trimalleolar ankle fracture on 9/3/2022.  She was kept overnight due to hyperglycemia and then discharged home in the morning of 9/4/2022. She will need to follow-up with Dr. Galarza, orthopedic surgeon in 10 to 12 days for staple removal.  She will be toe-touch weightbearing status for 6 weeks.  Walker, home PT and commode were ordered on discharge.      DISCHARGE Follow Up Recommendations for labs and diagnostics:       Reasons For Change In Medications and Indications for New Medications:      Day of Discharge     Vital Signs:  Temp:  [97.4 °F (36.3 °C)-97.9 °F (36.6 °C)] 97.8 °F (36.6 °C)  Heart Rate:  [65-89] 65  Resp:  [16-18] 18  BP: (150-161)/(62-70) 150/70    Physical Exam:  Physical Exam  HENT:      Head: Normocephalic.      Mouth/Throat:      Mouth: Mucous membranes are moist.   Eyes:      Extraocular Movements: Extraocular movements intact.      Pupils: Pupils are equal, round, and reactive to light.   Cardiovascular:      Rate and Rhythm: Normal rate and regular rhythm.   Pulmonary:      Effort: Pulmonary effort is normal.   Abdominal:      General: Bowel sounds are normal.   Musculoskeletal:      Cervical back: Normal range of motion.   Skin:     General: Skin is warm.   Neurological:      Mental Status: She is alert. Mental status is at baseline.   Psychiatric:         Mood and Affect: Mood normal.            Pertinent  and/or Most Recent Results     LAB RESULTS:      Lab 09/04/22  0010 09/03/22  0714 09/02/22  0327 09/01/22  1606   WBC  --  9.20 9.50 13.50*   HEMOGLOBIN 9.9* 11.0* 11.2* 11.9*   HEMATOCRIT 30.2* 34.2 33.7* 36.7   PLATELETS  --  258 261 294   NEUTROS ABS  --  6.50 6.30 10.80*   LYMPHS ABS  --  1.60 2.20  --    MONOS ABS  --  1.00* 1.00*  --    EOS ABS  --  0.10 0.00 0.14   MCV  --  93.4 91.3 91.8   LACTATE  --   --  1.0  --    PROTIME  --   --   --  10.6   APTT  --   --   --  23.4*         Lab 09/04/22  0010 09/03/22  1023 09/03/22  0714 09/02/22  0327 09/01/22  1606    SODIUM 131* 138 137 137 138   POTASSIUM 5.3* 4.9 4.7 4.7 4.6   CHLORIDE 100 106 105 102 101   CO2 18.0* 21.0* 21.0* 25.0 26.0   ANION GAP 13.0 11.0 11.0 10.0 11.0   BUN 33* 30* 32* 36* 39*   CREATININE 1.81* 1.76* 1.77* 1.74* 2.01*   EGFR 29.1* 30.1* 29.9* 30.5* 25.6*   GLUCOSE 345* 191* 165* 157* 128*   CALCIUM 7.7* 8.1* 8.5* 8.9 9.5   MAGNESIUM  --   --   --   --  1.7         Lab 09/01/22  1606   TOTAL PROTEIN 7.3   ALBUMIN 4.20   GLOBULIN 3.1   ALT (SGPT) 15   AST (SGOT) 17   BILIRUBIN 0.6   ALK PHOS 30*         Lab 09/02/22  0327 09/01/22  1606   TROPONIN T <0.010 <0.010   PROTIME  --  10.6   INR  --  1.03                 Brief Urine Lab Results  (Last result in the past 365 days)      Color   Clarity   Blood   Leuk Est   Nitrite   Protein   CREAT   Urine HCG        09/03/22 0714 Yellow   Clear   Negative   Moderate (2+)   Negative   Trace               Microbiology Results (last 10 days)     Procedure Component Value - Date/Time    Blood Culture - Blood, Hand, Right [[id number]]  (Normal) Collected: 09/03/22 1023    Lab Status: Preliminary result Specimen: Blood from Hand, Right Updated: 09/04/22 1048     Blood Culture No growth at 24 hours    Blood Culture - Blood, Arm, Left [[id number]]  (Normal) Collected: 09/01/22 1848    Lab Status: Preliminary result Specimen: Blood from Arm, Left Updated: 09/03/22 1901     Blood Culture No growth at 2 days    Blood Culture - Blood, Arm, Right [[id number]]  (Normal) Collected: 09/01/22 1848    Lab Status: Preliminary result Specimen: Blood from Arm, Right Updated: 09/03/22 1901     Blood Culture No growth at 2 days          XR Ankle 3+ View Left    Result Date: 9/1/2022  Impression: Trimalleolar fracture. Mild displacement medial malleolus and distal fibula. Posterior malleoli or fracture fragment is not significantly displaced. Ankle mortise appears disrupted.  Electronically Signed By-Randi Stanford MD On:9/1/2022 3:54 PM This report was finalized on [id number] by  Randi  MD Hien.    CT Head Without Contrast    Result Date: 9/1/2022  Impression: 1. No intracranial hemorrhage or acute intracranial abnormality. 2. Mild white matter findings suggesting chronic microvascular disease.  Electronically Signed By-Tong Lozoya MD On:9/1/2022 4:11 PM This report was finalized on [id number] by  Tong Lozoya MD.    CT Cervical Spine Without Contrast    Result Date: 9/1/2022  Impression: 1. Negative for cervical spine fracture. 2. Multilevel cervical degenerative findings above.  Electronically Signed By-Tong Lozoya MD On:9/1/2022 4:26 PM This report was finalized on [id number] by  Tong Lozoya MD.    XR Chest 1 View    Result Date: 9/1/2022  Impression: No acute cardiopulmonary abnormality.  Electronically Signed By-Randi Stanford MD On:9/1/2022 3:53 PM This report was finalized on [id number] by  Randi Stanford MD.              Results for orders placed during the hospital encounter of 09/01/22    Adult Transthoracic Echo Complete W/ Cont if Necessary Per Protocol    Interpretation Summary  · Left ventricular ejection fraction appears to be 56 - 60%.  · The right ventricular cavity is mildly dilated.  · No pericardial effusion noted      Labs Pending at Discharge:  Pending Labs     Order Current Status    Urine Culture - Urine, Urine, Clean Catch In process    Blood Culture - Blood, Arm, Left Preliminary result    Blood Culture - Blood, Arm, Right Preliminary result    Blood Culture - Blood, Hand, Right Preliminary result          Procedures Performed  Procedure(s):  ANKLE OPEN REDUCTION INTERNAL FIXATION         Consults:   Consults     Date and Time Order Name Status Description    9/1/2022  9:52 PM Inpatient Cardiology Consult Completed     9/1/2022  5:32 PM Ortho (on-call MD unless specified) Completed             Discharge Details        Discharge Medications      New Medications      Instructions Start Date   Gvoke HypoPen 2-Pack 1 MG/0.2ML solution auto-injector  Generic drug:  Glucagon   1 mg, Subcutaneous, As Needed      ondansetron 8 MG tablet  Commonly known as: Zofran   8 mg, Oral, Every 12 Hours PRN      oxyCODONE-acetaminophen 5-325 MG per tablet  Commonly known as: PERCOCET   1 tablet, Oral, Every 4 Hours PRN         Changes to Medications      Instructions Start Date   carvedilol 6.25 MG tablet  Commonly known as: Coreg  What changed:   · medication strength  · how much to take   6.25 mg, Oral, 2 Times Daily With Meals         Continue These Medications      Instructions Start Date   alendronate 70 MG tablet  Commonly known as: FOSAMAX   1 tablet, Oral, Every 7 Days, fridays      amLODIPine 10 MG tablet  Commonly known as: NORVASC   10 mg, Oral, Daily      aspirin 81 MG EC tablet   1 tablet, Oral, Every Other Day      atorvastatin 40 MG tablet  Commonly known as: LIPITOR   1 tablet, Oral, Daily      cholecalciferol 25 MCG (1000 UT) tablet  Commonly known as: VITAMIN D3   2,000 Units, Oral, 2 Times Daily      ferrous sulfate 324 (65 Fe) MG tablet delayed-release EC tablet   324 mg, Oral, Daily With Breakfast      fish oil 1000 MG capsule capsule   1,000 mg, Oral, 2 Times Daily With Meals      glucagon 1 MG injection  Commonly known as: GLUCAGEN   1 mg, Intramuscular, Once As Needed      HumaLOG KwikPen 100 UNIT/ML solution pen-injector  Generic drug: Insulin Lispro (1 Unit Dial)   10 Units, Subcutaneous, 3 Times Daily With Meals      Levemir FlexTouch 100 UNIT/ML injection  Generic drug: insulin detemir   26-28 Units, Subcutaneous, Nightly      losartan 100 MG tablet  Commonly known as: COZAAR   1 tablet, Oral, Daily      pantoprazole 40 MG EC tablet  Commonly known as: PROTONIX   40 mg, Oral, 2 times daily      SITagliptin 25 MG tablet  Commonly known as: JANUVIA   25 mg, Oral, Daily      sodium bicarbonate 650 MG tablet   650 mg, Oral, 3 Times Daily      Zinc 50 MG capsule   1 tablet, Oral, Daily             Allergies   Allergen Reactions   • Ibuprofen Other (See Comments) and  Nausea And Vomiting     Kidney and liver problems         Discharge Disposition:   Home or Self Care    Diet:  Hospital:No active diet order        Discharge Activity:   Activity Instructions    Toe touch weight bearing           Discharge Condition: stable      CODE STATUS:  Code Status and Medical Interventions:   Ordered at: 09/03/22 1013     Level Of Support Discussed With:    Patient     Code Status (Patient has no pulse and is not breathing):    CPR (Attempt to Resuscitate)     Medical Interventions (Patient has pulse or is breathing):    Full         Future Appointments   Date Time Provider Department Center   9/5/2022  2:00 PM ROOM 1, [facility] None   1/17/2023  2:20 PM Cesar Ellis MD [facility]       Additional Instructions for the Follow-ups that You Need to Schedule     Ambulatory Referral to Home Health ([facility])   As directed      Face to Face Visit Date: 9/3/2022    Follow-up provider for Plan of Care?: I will be treating the patient on an ongoing basis.  Please send me the Plan of Care for signature.    Follow-up provider: HELENA HASTINGS [[id number]]    Reason/Clinical Findings: Deconditioning    Describe mobility limitations that make leaving home difficult: Deconditioning    Nursing/Therapeutic Services Requested: Skilled Nursing Physical Therapy Occupational Therapy    Skilled nursing orders: Medication education    Frequency: 1 Week 1         Discharge Follow-up with PCP   As directed       Currently Documented PCP:    Helena Hastings MD    PCP Phone Number:    [phone]     Follow Up Details: 2 weeks         Discharge Follow-up with Specified Provider: Dr. Galarza orthopedic surgeon; 2 Weeks   As directed      To: Dr. Galarza orthopedic surgeon    Follow Up: 2 Weeks    Follow Up Details: staple removal in 10 to 12 days.               Time spent on Discharge including face to face service:  20 minutes    This patient has been examined wearing appropriate Personal  Protective Equipment and discussed with nursing. 09/04/22      Signature: Electronically signed by Elder Matamoros DO, 09/04/22, 11:29 AM EDT.

## 2022-09-04 NOTE — DISCHARGE PLACEMENT REQUEST
"Aramis Yeager (76 y.o. Female)             Date of Birth   1945    Social Security Number       Address   [address] [place] [address] [place] [facility]    Home Phone   [phone]    MRN   [id number]       Synagogue   Judaism    Marital Status                               Admission Date   9/1/22    Admission Type   Emergency    Admitting Provider   Elder Matamoros DO    Attending Provider   Elder Matamoros DO    Department, Room/Bed   [facility] MEDICAL INPATIENT, 229/1       Discharge Date       Discharge Disposition   Home or Self Care    Discharge Destination                               Attending Provider: Elder Matamoros DO    Allergies: Ibuprofen    Isolation: None   Infection: None   Code Status: CPR   Advance Care Planning Activity    Ht: 165.1 cm (65\")   Wt: 90.3 kg (199 lb)    Admission Cmt: None   Principal Problem: Syncope [R55]                 Active Insurance as of 9/1/2022     Primary Coverage     Payor Plan Insurance Group Employer/Plan Group    MEDICARE MEDICARE A & B      Payor Plan Address Payor Plan Phone Number Payor Plan Fax Number Effective Dates    [address] [phone]  11/1/2012 - None Entered    [facility] [id number]       Subscriber Name Subscriber Birth Date Member ID       ARAMIS YEAGER 1945 [id number]           Secondary Coverage     Payor Plan Insurance Group Employer/Plan Group    AARP MC SUP AARP HEALTH CARE OPTIONS      Payor Plan Address Payor Plan Phone Number Payor Plan Fax Number Effective Dates    [facility] [phone]  1/1/2021 - None Entered    [address]       [facility] [address]       Subscriber Name Subscriber Birth Date Member ID       ARAMIS YEAGER 1945 [id number]                 Emergency Contacts      (Rel.) Home Phone Work Phone Mobile Phone    SADAF BROWNING (Daughter) [phone] -- [phone]    KIM MAYER (Spouse) [phone] -- " --               History & Physical      Skye Contreras APRN at 22     Attestation signed by Elder Matamoros DO at 22 1114    I have reviewed this documentation and agree.    Electronically signed by Elder Matamoros DO, 22, 11:14 AM EDT.                       [facility] Medicine Services      Patient Name: Neeta Harrell  : 1945  MRN: [id number]  Primary Care Physician:  Landon Smith MD  Date of admission: 2022      Subjective      Chief Complaint: Syncope with left lower extremity injury    History of Present Illness: Neeta Harrell is a 76 y.o. female who presented to [facility] on 2022 complaining of syncope with left lower extremity injury during hypoglycemia crisis.  The patient has been under a lot of stress as her brother passed away yesterday.  She is tearful when discussing this at time of interview.  The patient and her sister were at the floral shop picking out flower arrangements for the  which is to be held on Monday when her continuous glucose monitor started reading that her blood sugar was rapidly dropping.  The patient had glucose tablets but opted for a Snickers instead and while she was eating it she suddenly lost consciousness and collapsed with brief loss of consciousness.  The patient fell on concrete floor and subsequently had significant left ankle pain.  The patient reports that her blood sugar is usually well controlled with few lows and believes the stress of losing her brother may have contributed to the low.  The patient denies recent subjective fever, chills, cough, chest pain, dyspnea with exertion, dysuria, or other constitutional symptoms.    In the emergency room the patient was noted to have trimalleolar fracture.  EKG was noted to show sinus bradycardia with rate 43.  Patient had also noted to be bradycardic per EMS.  The patient was also noted to have turbulent urine  with WBCs too many to count and 4+ bacteria.        Review of Systems   Constitutional: Positive for decreased appetite. Negative for chills and fever.   Cardiovascular: Positive for syncope. Negative for chest pain, dyspnea on exertion and palpitations.   Respiratory: Negative for cough and shortness of breath.    Gastrointestinal: Positive for nausea. Negative for abdominal pain and vomiting.   Genitourinary: Negative for dysuria.   All other systems reviewed and are negative.       Personal History     Past Medical History:   Diagnosis Date   • Arthritis    • CKD (chronic kidney disease)    • Diabetes mellitus (HCC)    • Hyperlipidemia    • Hypertension        Past Surgical History:   Procedure Laterality Date   •  SECTION      x2   • CHOLECYSTECTOMY     • HYSTERECTOMY     • INCONTINENCE SURGERY     • TONSILLECTOMY         Family History: family history includes Arrhythmia in her sister; Heart disease in her brother. Otherwise pertinent FHx was reviewed and not pertinent to current issue.    Social History:  reports that she has never smoked. She has never used smokeless tobacco. She reports that she does not drink alcohol and does not use drugs.    Home Medications:  Prior to Admission Medications     Prescriptions Last Dose Informant Patient Reported? Taking?    Accu-Chek Softclix Lancets lancets   Yes No    1 each 3 (Three) Times a Day. Test BG TID    alendronate (FOSAMAX) 70 MG tablet   Yes No    Take 1 tablet by mouth Daily.    amLODIPine (NORVASC) 10 MG tablet   Yes No    Take 10 mg by mouth Daily.    aspirin 81 MG EC tablet   Yes No    Take 1 tablet by mouth Every Other Day.    atorvastatin (LIPITOR) 40 MG tablet   Yes No    Take 1 tablet by mouth Daily.    carvedilol (COREG) 12.5 MG tablet   No No    TAKE 1 TABLET BY MOUTH TWICE DAILY WITH MEALS    cholecalciferol (VITAMIN D3) 25 MCG (1000 UT) tablet   Yes No    Take 2,000 Units by mouth Daily.    Cholecalciferol 50 MCG (2000 UT) tablet   Yes No     Take 1 tablet by mouth Daily.    ferrous sulfate 324 (65 Fe) MG tablet delayed-release EC tablet   Yes No    Take 324 mg by mouth Daily With Breakfast.    glucagon (GLUCAGEN) 1 MG injection   Yes No    Inject 1 mg into the appropriate muscle as directed by prescriber 1 (One) Time As Needed.    glucose blood test strip   Yes No    1 each by Other route 3 (Three) Times a Day As Needed.    insulin aspart (NovoLOG FlexPen) 100 UNIT/ML solution pen-injector sc pen   Yes No    Inject 10 Units under the skin into the appropriate area as directed 3 (Three) Times a Day With Meals.    insulin detemir (Levemir FlexTouch) 100 UNIT/ML injection   Yes No    Inject 18 Units under the skin into the appropriate area as directed Every Night.    losartan (COZAAR) 100 MG tablet   Yes No    Take 1 tablet by mouth Daily.    Omega-3 Fatty Acids (fish oil) 1000 MG capsule capsule   Yes No    Take 1,000 mg by mouth 2 (Two) Times a Day With Meals.    pantoprazole (PROTONIX) 40 MG EC tablet   Yes No    Take 40 mg by mouth 2 (two) times a day.    SITagliptin (JANUVIA) 25 MG tablet   Yes No    Take 25 mg by mouth Daily.    sodium bicarbonate 650 MG tablet   Yes No    Take 650 mg by mouth 3 (Three) Times a Day.    sodium bicarbonate 650 MG tablet   Yes No    Take 1 tablet by mouth 3 (Three) Times a Day.    vitamin E 1000 UNIT capsule   Yes No    Take 1,000 Units by mouth Daily.    Zinc 50 MG capsule   Yes No    Take 1 tablet by mouth Daily.            Allergies:  Allergies   Allergen Reactions   • Ibuprofen Other (See Comments) and Nausea And Vomiting     Kidney and liver problems       Objective      Vitals:   Temp:  [97.4 °F (36.3 °C)-97.6 °F (36.4 °C)] 97.6 °F (36.4 °C)  Heart Rate:  [44-62] 61  Resp:  [15-18] 18  BP: (121-163)/(52-76) 161/76    Physical Exam  Vitals and nursing note reviewed.   Constitutional:       Appearance: Normal appearance. She is not ill-appearing.   HENT:      Head: Normocephalic and atraumatic.      Right Ear:  External ear normal.      Left Ear: External ear normal.      Nose: Nose normal.      Mouth/Throat:      Mouth: Mucous membranes are moist.   Eyes:      General: No scleral icterus.        Right eye: No discharge.         Left eye: No discharge.      Extraocular Movements: Extraocular movements intact.      Conjunctiva/sclera: Conjunctivae normal.      Pupils: Pupils are equal, round, and reactive to light.   Cardiovascular:      Rate and Rhythm: Normal rate and regular rhythm.      Pulses: Normal pulses.      Heart sounds: Normal heart sounds. No murmur heard.  Pulmonary:      Breath sounds: Normal breath sounds.   Abdominal:      General: Bowel sounds are normal.      Palpations: Abdomen is soft.   Musculoskeletal:         General: Signs of injury present. Normal range of motion.      Cervical back: Normal range of motion and neck supple.      Right lower leg: No edema.      Left lower leg: Edema present.      Comments: Splint in place with good cap refill of the exposed toes   Skin:     General: Skin is warm and dry.      Capillary Refill: Capillary refill takes less than 2 seconds.   Neurological:      General: No focal deficit present.      Mental Status: She is alert and oriented to person, place, and time.   Psychiatric:         Mood and Affect: Mood normal.         Behavior: Behavior normal.         Thought Content: Thought content normal.         Judgment: Judgment normal.          Result Review    Result Review:  I have personally reviewed the results from the time of this admission to 9/1/2022 20:56 EDT and agree with these findings:  [x]  Laboratory  [x]  Microbiology  [x]  Radiology  [x]  EKG/Telemetry   [x]  Cardiology/Vascular   []  Pathology  [x]  Old records  []  Other:  Most notable findings include:     Assessment & Plan        Active Hospital Problems:  Active Hospital Problems    Diagnosis    • **Syncope    • Hypoglycemia    • Bradycardia    • Closed trimalleolar fracture of left ankle    • Type  2 diabetes mellitus with diabetic nephropathy (HCC)    • GERD without esophagitis    • Hyperlipidemia    • Acute UTI (urinary tract infection)      Plan:     Bradycardia with syncope and colapse--likely multifactorial with hypoglycemia, nausea, and chronic beta-blocker: Hold Coreg; cardiology consult; echocardiogram; serial troponin  -No chest pain  -Hx of tachy arrhythmia; followed by Dr. Ellis  --Of note, the patient is adamant that she is not going to miss her brother's  and wants to be out of the hospital by  even if that will delay her ankle surgery    Acute hypoglycemia--likely secondary to loss of appetite due to emotional stress; consideration for UTI: Hold oral hypoglycemic medication; decrease Lantus dose; check blood glucose at 2 AM    UTI, asymptomatic bacteriuria and elevated WBC in a diabetic patient: IV Rocephin x4 days    Trimalleolar fracture status post fall: Orthopedic consult  -Of note, the patient is adamant that she is not going to miss her brother's  and wants to be out of the hospital by  even if that will delay her ankle surgery    Diabetes type 2, chronic hold mealtime insulin; continue Levemir with dose decreased to 70% of home dose; hold Januvia    Osteoporosis, chronic: Hold vitamin D; hold Fosamax    CKD stage IV, chronic, creatinine 2.01 which is at baseline: Continue sodium bicarb    Hypertension, chronic with mildly elevated blood pressure with cardiovascular prophylaxis: Continue Norvasc; hold Coreg; hold aspirin; continue Cozaar    HLD, chronic: Continue Lipitor    Anemia, chronic: Continue ferrous sulfate    GERD, chronic: Continue Protonix    Anemia, mild, chronic, hemoglobin 11.9--likely secondary to stage IV CKD: Repeat CBC in a.m.    DVT prophylaxis:  Medical DVT prophylaxis orders are present.    CODE STATUS:       Admission Status:  I believe this patient meets observation status.    I discussed the patient's findings and my recommendations with  patient, family and nursing staff.    This patient has been examined wearing appropriate Personal Protective Equipment. 09/01/22      Signature: Electronically signed by DEREJE Phillips, 09/01/22, 10:27 PM EDT.      Electronically signed by Elder Matamoros DO at 09/02/22 1114          Physician Progress Notes (last 24 hours)      Cesar Ellis MD at 09/03/22 1217          Referring Provider: Hospitalist    Reason for follow-up: Syncope     Patient Care Team:  Landon Smith MD as PCP - General  Landon Smith MD as PCP - Family Medicine  Cesar Ellis MD as Consulting Physician (Cardiology)    Subjective .  Patient feeling better today without any symptoms    Objective  Lying in bed comfortably     Review of Systems   Constitutional: Negative for fever and malaise/fatigue.   Cardiovascular: Negative for chest pain, dyspnea on exertion and palpitations.   Respiratory: Negative for cough and shortness of breath.    Skin: Negative for rash.   Gastrointestinal: Negative for abdominal pain, nausea and vomiting.   Neurological: Negative for focal weakness and headaches.   All other systems reviewed and are negative.      Ibuprofen    Scheduled Meds:amLODIPine, 10 mg, Oral, Daily  aspirin, 81 mg, Oral, Every Other Day  atorvastatin, 40 mg, Oral, Daily  cefTRIAXone, 2 g, Intravenous, Q24H  ferrous sulfate, 324 mg, Oral, Daily With Breakfast  insulin glargine, 18 Units, Subcutaneous, Nightly  insulin lispro, 0-7 Units, Subcutaneous, TID AC  losartan, 100 mg, Oral, Daily  pantoprazole, 40 mg, Oral, BID AC  polyethylene glycol, 17 g, Oral, Daily  sodium bicarbonate, 650 mg, Oral, TID  sodium chloride, 10 mL, Intravenous, Q12H      Continuous Infusions:sodium chloride, 100 mL/hr, Last Rate: 100 mL/hr (09/03/22 0509)  sodium chloride, 75 mL/hr  sodium chloride, 125 mL/hr, Last Rate: 125 mL/hr (09/03/22 1034)      PRN Meds:.•  acetaminophen **OR** acetaminophen **OR** acetaminophen  •  ALPRAZolam  •   "dextrose  •  dextrose  •  glucagon (human recombinant)  •  hydrALAZINE  •  HYDROcodone-acetaminophen  •  Morphine **AND** naloxone  •  ondansetron **OR** ondansetron  •  oxyCODONE  •  sodium chloride  •  [COMPLETED] Insert peripheral IV **AND** sodium chloride  •  sodium chloride        VITAL SIGNS  Vitals:    09/03/22 0928 09/03/22 0943 09/03/22 0957 09/03/22 1015   BP: 158/54 160/50 149/57 153/71   BP Location:   Left arm Left arm   Patient Position:   Lying Lying   Pulse: 76 81 82 81   Resp: 14 16 15 16   Temp:   97.7 °F (36.5 °C) 97.6 °F (36.4 °C)   TempSrc:   Temporal Oral   SpO2: 98% 99% 96% 97%   Weight:       Height:           Flowsheet Rows    Flowsheet Row First Filed Value   Admission Height 165.1 cm (65\") Documented at 09/01/2022 1409   Admission Weight 88.9 kg (196 lb) Documented at 09/01/2022 1409           TELEMETRY: Sinus rhythm    Physical Exam:  Constitutional:       Appearance: Well-developed.   Eyes:      General: No scleral icterus.     Conjunctiva/sclera: Conjunctivae normal.   HENT:      Head: Normocephalic and atraumatic.   Neck:      Vascular: No carotid bruit or JVD.   Pulmonary:      Effort: Pulmonary effort is normal.      Breath sounds: Normal breath sounds. No wheezing. No rales.   Cardiovascular:      Normal rate. Regular rhythm.   Pulses:     Intact distal pulses.   Abdominal:      General: Bowel sounds are normal.      Palpations: Abdomen is soft.   Musculoskeletal:      Cervical back: Normal range of motion and neck supple. Skin:     General: Skin is warm and dry.      Findings: No rash.   Neurological:      Mental Status: Alert.          Results Review:   I reviewed the patient's new clinical results.  Lab Results (last 24 hours)     Procedure Component Value Units Date/Time    POC Glucose Once [[id number]]  (Abnormal) Collected: 09/03/22 1144    Specimen: Blood Updated: 09/03/22 1146     Glucose 203 mg/dL      Comment: Serial Number: [id number]:  [id number]       Basic " Metabolic Panel [[id number]]  (Abnormal) Collected: 09/03/22 1023    Specimen: Blood Updated: 09/03/22 1057     Glucose 191 mg/dL      BUN 30 mg/dL      Creatinine 1.76 mg/dL      Sodium 138 mmol/L      Potassium 4.9 mmol/L      Chloride 106 mmol/L      CO2 21.0 mmol/L      Calcium 8.1 mg/dL      BUN/Creatinine Ratio 17.0     Anion Gap 11.0 mmol/L      eGFR 30.1 mL/min/1.73      Comment: National Kidney Foundation and American Society of Nephrology (ASN) Task Force recommended calculation based on the Chronic Kidney Disease Epidemiology Collaboration (CKD-EPI) equation refit without adjustment for race.       Narrative:      GFR Normal >60  Chronic Kidney Disease <60  Kidney Failure <15      Blood Culture - Blood, Hand, Right [[id number]] Collected: 09/03/22 1023    Specimen: Blood from Hand, Right Updated: 09/03/22 1032    POC Glucose Once [[id number]]  (Abnormal) Collected: 09/03/22 1021    Specimen: Blood Updated: 09/03/22 1022     Glucose 190 mg/dL      Comment: Serial Number: [id number]:  [id number]       POC Glucose Once [[id number]]  (Abnormal) Collected: 09/03/22 0916    Specimen: Blood Updated: 09/03/22 0917     Glucose 150 mg/dL      Comment: Serial Number: [id number]:  [id number]       Basic Metabolic Panel [[id number]]  (Abnormal) Collected: 09/03/22 0714    Specimen: Blood Updated: 09/03/22 0830     Glucose 165 mg/dL      BUN 32 mg/dL      Creatinine 1.77 mg/dL      Sodium 137 mmol/L      Potassium 4.7 mmol/L      Comment: Slight hemolysis detected by analyzer. Results may be affected.        Chloride 105 mmol/L      CO2 21.0 mmol/L      Calcium 8.5 mg/dL      BUN/Creatinine Ratio 18.1     Anion Gap 11.0 mmol/L      eGFR 29.9 mL/min/1.73      Comment: National Kidney Foundation and American Society of Nephrology (ASN) Task Force recommended calculation based on the Chronic Kidney Disease Epidemiology Collaboration (CKD-EPI) equation refit without adjustment for race.       Narrative:      GFR  Normal >60  Chronic Kidney Disease <60  Kidney Failure <15      Urinalysis, Microscopic Only - Urine, Clean Catch [[id number]]  (Abnormal) Collected: 09/03/22 0714    Specimen: Urine, Clean Catch Updated: 09/03/22 0824     RBC, UA 0-2 /HPF      WBC, UA Too Numerous to Count /HPF      Bacteria, UA None Seen /HPF      Squamous Epithelial Cells, UA 0-2 /HPF      Hyaline Casts, UA None Seen /LPF      Methodology Automated Microscopy    Urinalysis With Culture If Indicated - Urine, Clean Catch [[id number]]  (Abnormal) Collected: 09/03/22 0714    Specimen: Urine, Clean Catch Updated: 09/03/22 0824     Color, UA Yellow     Appearance, UA Clear     pH, UA <=5.0     Specific Gravity, UA 1.012     Glucose,  mg/dL (Trace)     Ketones, UA Negative     Bilirubin, UA Negative     Blood, UA Negative     Protein, UA Trace     Leuk Esterase, UA Moderate (2+)     Nitrite, UA Negative     Urobilinogen, UA 0.2 E.U./dL    Narrative:      In absence of clinical symptoms, the presence of pyuria, bacteria, and/or nitrites on the urinalysis result does not correlate with infection.    Urine Culture - Urine, Urine, Clean Catch [[id number]] Collected: 09/03/22 0714    Specimen: Urine, Clean Catch Updated: 09/03/22 0824    CBC & Differential [[id number]]  (Abnormal) Collected: 09/03/22 0714    Specimen: Blood Updated: 09/03/22 0812    Narrative:      The following orders were created for panel order CBC & Differential.  Procedure                               Abnormality         Status                     ---------                               -----------         ------                     CBC Auto Differential[[id number]]        Abnormal            Final result                 Please view results for these tests on the individual orders.    CBC Auto Differential [[id number]]  (Abnormal) Collected: 09/03/22 0714    Specimen: Blood Updated: 09/03/22 0812     WBC 9.20 10*3/mm3      RBC 3.67 10*6/mm3      Hemoglobin 11.0 g/dL      Hematocrit  34.2 %      MCV 93.4 fL      MCH 29.9 pg      MCHC 32.0 g/dL      RDW 13.6 %      RDW-SD 44.2 fl      MPV 8.3 fL      Platelets 258 10*3/mm3      Neutrophil % 70.1 %      Lymphocyte % 17.2 %      Monocyte % 10.6 %      Eosinophil % 1.6 %      Basophil % 0.5 %      Neutrophils, Absolute 6.50 10*3/mm3      Lymphocytes, Absolute 1.60 10*3/mm3      Monocytes, Absolute 1.00 10*3/mm3      Eosinophils, Absolute 0.10 10*3/mm3      Basophils, Absolute 0.00 10*3/mm3      nRBC 0.0 /100 WBC     POC Glucose Once [[id number]]  (Abnormal) Collected: 09/03/22 0537    Specimen: Blood Updated: 09/03/22 0541     Glucose 146 mg/dL      Comment: Serial Number: [id number]:  [id number]       POC Glucose Once [[id number]]  (Abnormal) Collected: 09/02/22 2032    Specimen: Blood Updated: 09/02/22 2033     Glucose 200 mg/dL      Comment: Serial Number: [id number]:  [id number]       Blood Culture - Blood, Arm, Left [[id number]]  (Normal) Collected: 09/01/22 1848    Specimen: Blood from Arm, Left Updated: 09/02/22 1902     Blood Culture No growth at 24 hours    Blood Culture - Blood, Arm, Right [[id number]]  (Normal) Collected: 09/01/22 1848    Specimen: Blood from Arm, Right Updated: 09/02/22 1902     Blood Culture No growth at 24 hours    POC Glucose Once [[id number]]  (Abnormal) Collected: 09/02/22 1707    Specimen: Blood Updated: 09/02/22 1708     Glucose 273 mg/dL      Comment: Serial Number: [id number]:  [id number]             Imaging Results (Last 24 Hours)     Procedure Component Value Units Date/Time    FL C Arm During Surgery [[id number]] Resulted: 09/03/22 0900     Updated: 09/03/22 0916    XR Ankle 2 View Left [[id number]] Resulted: 09/03/22 0900     Updated: 09/03/22 0900          EKG      I personally viewed and interpreted the patient's EKG/Telemetry data:    ECHOCARDIOGRAM:    STRESS MYOVIEW:    CARDIAC CATHETERIZATION:    OTHER:         Assessment & Plan     Principal Problem:    Syncope  Active Problems:     Hypoglycemia    Type 2 diabetes mellitus with diabetic nephropathy (HCC)    Bradycardia    Closed trimalleolar fracture of left ankle    Hyperlipidemia    GERD without esophagitis    Acute UTI (urinary tract infection)    Closed trimalleolar fracture of left ankle, initial encounter       Patient presented with a syncopal episode but was probably schedule hypoglycemia  Patient is on a monitor and does not have any arrhythmias  Patient had ankle fracture for which she had surgery today  Patient is currently stable and will be discharged to home and followed as an outpatient    I discussed the patients findings and my recommendations with patient and nurse    Cesar Ellis MD  22  12:17 EDT                Electronically signed by Cesar Ellis MD at 22 1219     [facility] Romero DO at 22 1009              [facility] Daily Progress Note    Patient Name: Neeta Harrell  : 1945  MRN: [id number]  Primary Care Physician:  Landon Smith MD  Date of admission: 2022      Subjective      Chief Complaint: Leg pain      Patient Reports     22: Left leg pain.  Will order Xanax for anxiety.    9/3/22: Planned ankle repair today.    Review of Systems   All other systems reviewed and are negative.        Objective      Vitals:   Temp:  [97.7 °F (36.5 °C)-98.3 °F (36.8 °C)] 97.7 °F (36.5 °C)  Heart Rate:  [60-98] 82  Resp:  [11-21] 15  BP: (149-189)/(45-72) 149/57  Flow (L/min):  [2-4] 2    Physical Exam  HENT:      Head: Normocephalic.      Nose: Nose normal.   Eyes:      Extraocular Movements: Extraocular movements intact.      Pupils: Pupils are equal, round, and reactive to light.   Cardiovascular:      Rate and Rhythm: Normal rate and regular rhythm.   Pulmonary:      Effort: Pulmonary effort is normal.   Abdominal:      General: Bowel sounds are normal.   Musculoskeletal:      Cervical back: Normal range of motion.      Comments:  Decreased range of motion left leg   Skin:     General: Skin is warm.   Neurological:      Mental Status: She is alert. Mental status is at baseline.   Psychiatric:         Mood and Affect: Mood normal.             Result Review    Result Review:  I have personally reviewed the results from the time of this admission to 9/3/2022 10:09 EDT and agree with these findings:  [x]  Laboratory  []  Microbiology  [x]  Radiology  []  EKG/Telemetry   []  Cardiology/Vascular   []  Pathology  [x]  Old records  []  Other:      Wounds (last 24 hours)     LDA Wound     Row Name 09/03/22 0957 09/03/22 0943 09/03/22 0928       Wound 09/03/22 0834 Left lateral ankle Incision    Wound - Properties Group Placement Date: 09/03/22 -JY Placement Time: 0834  -JY Present on Hospital Admission: N  -JY Side: Left  -JY Orientation: lateral  -JY Location: ankle  -JY Primary Wound Type: Incision  -JY    Dressing Appearance dry;intact;no drainage  -AV dry;intact;no drainage  -AV dry;intact;no drainage  -AV    Closure KATHERINE  -AV KATHERINE  -AV KATHERINE  -AV    Retired Wound - Properties Group Placement Date: 09/03/22 -JY Placement Time: 0834  -JY Present on Hospital Admission: N  -JY Side: Left  -JY Orientation: lateral  -JY Location: ankle  -JY Primary Wound Type: Incision  -JY    Retired Wound - Properties Group Date first assessed: 09/03/22 -JY Time first assessed: 0834  -JY Present on Hospital Admission: N  -JY Side: Left  -JY Location: ankle  -JY Primary Wound Type: Incision  -JY    Row Name 09/03/22 0913 09/03/22 0854          Wound 09/03/22 0834 Left lateral ankle Incision    Wound - Properties Group Placement Date: 09/03/22 -JY Placement Time: 0834  -JY Present on Hospital Admission: N  -JY Side: Left  -JY Orientation: lateral  -JY Location: ankle  -JY Primary Wound Type: Incision  -JY     Dressing Appearance dry;intact;no drainage  -AV --     Closure KATHERINE  -AV --     Dressing Care -- dressing applied  xeroform,4x4's, webril, orthoglass splint, ace   -JY     Retired Wound - Properties Group Placement Date: 09/03/22  -JY Placement Time: 0834 -JY Present on Hospital Admission: N  -JY Side: Left  -JY Orientation: lateral  -JY Location: ankle  -JY Primary Wound Type: Incision  -JY     Retired Wound - Properties Group Date first assessed: 09/03/22 -JY Time first assessed: 0834 -JY Present on Hospital Admission: N  -JY Side: Left  -JY Location: ankle  -JY Primary Wound Type: Incision  -JY           User Key  (r) = Recorded By, (t) = Taken By, (c) = Cosigned By    Initials Name Provider Type    Amina Jarvis, RN Registered Nurse    Aisha Daugherty RN Registered Nurse                  Assessment & Plan      Brief Patient Summary:      amLODIPine, 10 mg, Oral, Daily  [MAR Hold] atorvastatin, 40 mg, Oral, Daily  cefTRIAXone, 2 g, Intravenous, Q24H  [MAR Hold] enoxaparin, 30 mg, Subcutaneous, Daily  [MAR Hold] ferrous sulfate, 324 mg, Oral, Daily With Breakfast  [MAR Hold] insulin glargine, 18 Units, Subcutaneous, Nightly  [MAR Hold] insulin lispro, 0-7 Units, Subcutaneous, TID AC  losartan, 100 mg, Oral, Daily  [MAR Hold] pantoprazole, 40 mg, Oral, BID AC  [MAR Hold] sodium bicarbonate, 650 mg, Oral, TID  [MAR Hold] sodium chloride, 10 mL, Intravenous, Q12H       sodium chloride, 100 mL/hr, Last Rate: 100 mL/hr (09/03/22 0509)  sodium chloride, 75 mL/hr         Active Hospital Problems:  Active Hospital Problems    Diagnosis    • **Syncope    • Closed trimalleolar fracture of left ankle, initial encounter    • Hypoglycemia    • Bradycardia    • Closed trimalleolar fracture of left ankle    • Hyperlipidemia    • GERD without esophagitis    • Acute UTI (urinary tract infection)    • Type 2 diabetes mellitus with diabetic nephropathy (HCC)      Syncope due to hypoglycemia complicated with trimalleolar fracture:  -s/p CT head /cervical spine in ED  -Orthopedic surgery consulted--> plan for repair 9/3/2022    UTI:  -Continue Rocephin    Anxiety:  -Xanax  PRN    Type 2 diabetes mellitus:  -Continue Lantus at reduced dose and ISS  -Hold home Januvia    CKD stage IV:  -Nephrotoxin holds  -Continue sodium bicarb    Hypertension:  -Continue home Norvasc, Coreg and Cozaar when blood pressure appropriate    Hyperlipidemia:  -Continue Lipitor    GERD:  -Protonix    Normocytic anemia:  -Monitor H&H        DVT prophylaxis:  Medical DVT prophylaxis orders are present.    CODE STATUS:    Level Of Support Discussed With: Patient  Code Status (Patient has no pulse and is not breathing): CPR (Attempt to Resuscitate)  Medical Interventions (Patient has pulse or is breathing): Full Support      Disposition:  I expect patient to be discharged defer.    This patient has been examined wearing appropriate Personal Protective Equipment and discussed with nursing. 09/03/22      Electronically signed by Elder Matamoros DO, 09/03/22, 10:09 EDT.  [facility] Hospitalist Team             Electronically signed by Elder Matamoros DO at 09/03/22 1010          Physical Therapy Notes (last 48 hours)      Melissa Orantes, PT at 09/02/22 0915  Version 1 of 1 09/02/22 0914   OTHER   Discipline physical therapist   Rehab Time/Intention   Session Not Performed other (see comments)  (Pt scheduled for ORIF left ankle tomorrow and will eval for PT postoperatively)   Therapy Assessment/Plan (PT)   Criteria for Skilled Interventions Met (PT) yes   Recommendation   PT - Next Appointment 09/03/22       Electronically signed by Melissa Oratnes PT at 09/02/22 0915     Shantell Vasquez PT at 09/03/22 0951  Version 1 of 1            09/03/22 0950   OTHER   Discipline physical therapist   Rehab Time/Intention   Session Not Performed patient unavailable for evaluation;other (see comments)  (ANKLE OPEN REDUCTION INTERNAL FIXATION 9/3/22. Will attempt tomorrow per pt status.)   Recommendation   PT - Next Appointment 09/04/22       Electronically signed by Shantell Vasquez PT at 09/03/22  0951     Shantell Vasquez, PT at 22 1400  Version 1 of 1       Goal Outcome Evaluation:  Plan of Care Reviewed With: patient, spouse, daughter           Outcome Evaluation: Pt is a 75 yo F who presents to [facility] after a hypoglycemic episode in which she passed out, fell, and sustained a L trimalleolar fracture. ORIF 9/3/22. Pt will be TTWB for 3 weeks. Pt has assist of , daughter, and family at home. Pt would likely benefit from dc to rehab, but refuses as she has to attend her brother's  on 22. Significant time spent on educating family and patient on safe transfers, proper lifting technique to avoid caregiver injury, safe use of AD and positioning of equipment, transfer problem solving, car transfers, use of WC (including pressure relief devices), mobility around the home, and WB precautions. Written and verbal instructions provided, and therapist demonstrating transfers on patient and family. Family able to practice transfers on patient and therapist. Pt able to transfer x3 from EOB to bedside chair, but requires increased assist with fatigue ranging from min/mod A to max A. Family educated on 2 person assist. Pt will need bedside commode, front wheeled walker upon dc home, and would benefit from  PT and OT.    Electronically signed by Shantell Vasquez PT at 22 1514     Shantell Vasquez PT at 22 1400  Version 1 of 1         Patient Name: Neeta Harrell  : 1945    MRN: [id number]                              Today's Date: 9/3/2022       Admit Date: 2022    Visit Dx:     ICD-10-CM ICD-9-CM   1. Closed trimalleolar fracture of left ankle, initial encounter  S82.852A 824.6   2. Hypoglycemia  E16.2 251.2   3. Bradycardia  R00.1 427.89     Patient Active Problem List   Diagnosis   • Trigeminy   • Hypoglycemia   • Osteoporosis   • Type 2 diabetes mellitus with diabetic nephropathy (HCC)   • Bradycardia   • Syncope   • Closed trimalleolar fracture of left ankle   •  Hyperlipidemia   • GERD without esophagitis   • Acute UTI (urinary tract infection)   • Closed trimalleolar fracture of left ankle, initial encounter     Past Medical History:   Diagnosis Date   • Arthritis    • CKD (chronic kidney disease)    • Diabetes mellitus (HCC)    • Hyperlipidemia    • Hypertension      Past Surgical History:   Procedure Laterality Date   •  SECTION      x2   • CHOLECYSTECTOMY     • HYSTERECTOMY     • INCONTINENCE SURGERY     • TONSILLECTOMY        General Information     Row Name 22 1427          Physical Therapy Time and Intention    Document Type evaluation  -HS     Mode of Treatment individual therapy  -HS     Row Name 22 142          General Information    Patient Profile Reviewed yes  -HS     Prior Level of Function independent:;ADL's;community mobility;all household mobility;gait  -HS     Existing Precautions/Restrictions fall;weight bearing  TTWB on the L LE  -HS     Barriers to Rehab physical barrier  -HS     Row Name 22 142          Living Environment    People in Home spouse  Daughter will be assisting; Grandson available to assist  -HS     Name(s) of People in Home Daughter Jaylene;  Tyrell  -     Row Name 22 142          Home Main Entrance    Number of Stairs, Main Entrance two  -HS     Stair Railings, Main Entrance railing on right side (ascending)  -HS     Row Name 22 142          Stairs Within Home, Primary    Number of Stairs, Within Home, Primary none  -HS     Row Name 22 142          Cognition    Orientation Status (Cognition) oriented x 4  -HS     Row Name 22 142          Safety Issues, Functional Mobility    Safety Issues Affecting Function (Mobility) positioning of assistive device;sequencing abilities  -HS     Impairments Affecting Function (Mobility) balance;endurance/activity tolerance;strength  -HS           User Key  (r) = Recorded By, (t) = Taken By, (c) = Cosigned By    Initials Name Provider Type     HS Shantell Vasquez, PT Physical Therapist               Mobility     Row Name 09/03/22 1430          Bed Mobility    Bed Mobility supine-sit-supine;scooting/bridging  -     Scooting/Bridging [place] (Bed Mobility) maximum assist (25% patient effort);2 person assist;other (see comments)  drawsheet transfer  -     Supine-Sit-Supine [place] (Bed Mobility) minimum assist (75% patient effort);moderate assist (50% patient effort)  -     Assistive Device (Bed Mobility) bed rails  -     Comment, (Bed Mobility) Pt requires min A for management of L LE  -     Row Name 09/03/22 1430          Transfers    Comment, (Transfers) STS transfers practiced from EOB to bedside chair x3. Pt requires assist ranging from min-max A to stand; increased assist required when pt becomes fatigued. Significant education provided to family on safe liftng mechanics, transfer setup, problem solving and trouble shooting transfers, and safe use of adaptive equipment and assitive devices. Transfers practiced with patient and therapist, with therapist demonstrating transfers with patient and family memebers. Pt with difficulty maintaining TTWB on L LE when fatigued.  -     Row Name 09/03/22 1430          Bed-Chair Transfer    Bed-Chair [place] (Transfers) minimum assist (75% patient effort);moderate assist (50% patient effort);maximum assist (25% patient effort);1 person assist;2 person assist  -[facility] Name 09/03/22 1430          Sit-Stand Transfer    Sit-Stand [place] (Transfers) minimum assist (75% patient effort);moderate assist (50% patient effort);maximum assist (25% patient effort);1 person assist;2 person assist  Varied w patient fatigue  -     Comment, (Sit-Stand Transfer) Requires consistent cueing for safety mechanics, form, and control of descent into chair.  -     Row Name 09/03/22 1430          Gait/Stairs (Locomotion)    [place] Level (Gait) not tested;other (see comments)  Pt with difficulty  maintaining WB precautions on L LE when fatigued; too tired to walk after transfersx 3  -HS     Comment, (Gait/Stairs) Increased education to family provided on management of stairs with family assist; pt unsafe for stair mobilityat this time after mobiliy assessment and education on WC transfers.  -           User Key  (r) = Recorded By, (t) = Taken By, (c) = Cosigned By    Initials Name Provider Type     Shantell Vasquez PT Physical Therapist               Obj/Interventions     Row Name 22 1505          Range of Motion Comprehensive    Comment, General Range of Motion B UE WFL; L foot immobilized in boot but remaining LE WFL per assessment of functional mobility  -     Row Name 22 1505          Strength Comprehensive (MMT)    Comment, General Manual Muscle Testing (MMT) Assessment B LE ~3/5 per assessment of functional mobility  -           User Key  (r) = Recorded By, (t) = Taken By, (c) = Cosigned By    Initials Name Provider Type     Shantell Vasquez PT Physical Therapist               Goals/Plan    No documentation.                Clinical Impression     Row Name 22 1506          Pain    Pretreatment Pain Rating --  no pain due to recent nn block  -     Row Name 22 1506          Plan of Care Review    Plan of Care Reviewed With patient;spouse;daughter  -     Outcome Evaluation Pt is a 77 yo F who presents to [facility] after a hypoglycemic episode in which she passed out, fell, and sustained a L trimalleolar fracture. ORIF 9/3/22. Pt will be TTWB for 3 weeks. Pt has assist of , daughter, and family at home. Pt would likely benefit from dc to rehab, but refuses as she has to attend her brother's  on 22. Significant time spent on educating family and patient on safe transfers, proper lifting technique to avoid caregiver injury, safe use of AD and positioning of equipment, transfer problem solving, car transfers, use of WC (including pressure relief devices), mobility  around the home, and WB precautions. Written and verbal instructions provided, and therapist demonstrating transfers on patient and family. Family able to practice transfers on patient and therapist. Pt able to transfer x3 from EOB to bedside chair, but requires increased assist with fatigue ranging from min/mod A to max A. Family educated on 2 person assist. Pt will need bedside commode, front wheeled walker upon dc home, and would benefit from  PT and OT.  -     Row Name 09/03/22 1506          Therapy Assessment/Plan (PT)    Criteria for Skilled Interventions Met (PT) other (see comments)  discharging today  -     Therapy Frequency (PT) evaluation only  -     Row Name 09/03/22 1506          Positioning and Restraints    Post Treatment Position bed  -HS     In Bed notified nsg;supine;exit alarm on;encouraged to call for assist;with family/caregiver  -           User Key  (r) = Recorded By, (t) = Taken By, (c) = Cosigned By    Initials Name Provider Type    Shantell Colmenares, PT Physical Therapist               Outcome Measures     Row Name 09/03/22 1015          How much help from another person do you currently need...    Turning from your back to your side while in flat bed without using bedrails? 4  -TA     Moving from lying on back to sitting on the side of a flat bed without bedrails? 3  -TA     Moving to and from a bed to a chair (including a wheelchair)? 2  -TA     Standing up from a chair using your arms (e.g., wheelchair, bedside chair)? 2  -TA     Climbing 3-5 steps with a railing? 1  -TA     To walk in hospital room? 2  -TA     AM-PAC 6 Clicks Score (PT) 14  -TA           User Key  (r) = Recorded By, (t) = Taken By, (c) = Cosigned By    Initials Name Provider Type    Jyoti Guidry, RN Registered Nurse                             Physical Therapy Education                 Title: PT OT SLP Therapies (Done)     Topic: Physical Therapy (Done)     Point: Mobility training (Done)     Learning  Progress Summary           Patient Acceptance, E,TB,D, VU,NR,DU,Bed IU by  at 9/3/2022 1513                   Point: Home exercise program (Done)     Learning Progress Summary           Patient Acceptance, E,TB,D, VU,NR,DU,Bed IU by  at 9/3/2022 1513                   Point: Body mechanics (Done)     Learning Progress Summary           Patient Acceptance, E,TB,D, VU,NR,DU,Bed IU by  at 9/3/2022 1513                   Point: Precautions (Done)     Learning Progress Summary           Patient Acceptance, E,TB,D, VU,NR,DU,Bed IU by  at 9/3/2022 1513                               User Key     Initials Effective Dates Name Provider Type Discipline     21 -  Shantlel Vasquez, PT Physical Therapist PT              PT Recommendation and Plan     Plan of Care Reviewed With: patient, spouse, daughter  Outcome Evaluation: Pt is a 77 yo F who presents to [facility] after a hypoglycemic episode in which she passed out, fell, and sustained a L trimalleolar fracture. ORIF 9/3/22. Pt will be TTWB for 3 weeks. Pt has assist of , daughter, and family at home. Pt would likely benefit from dc to rehab, but refuses as she has to attend her brother's  on 22. Significant time spent on educating family and patient on safe transfers, proper lifting technique to avoid caregiver injury, safe use of AD and positioning of equipment, transfer problem solving, car transfers, use of WC (including pressure relief devices), mobility around the home, and WB precautions. Written and verbal instructions provided, and therapist demonstrating transfers on patient and family. Family able to practice transfers on patient and therapist. Pt able to transfer x3 from EOB to bedside chair, but requires increased assist with fatigue ranging from min/mod A to max A. Family educated on 2 person assist. Pt will need bedside commode, front wheeled walker upon dc home, and would benefit from HH PT and OT.     Time Calculation:    PT Charges      Row Name 22 1514 22 0950          Time Calculation    Start Time 1224  -HS --     Stop Time 1400  -HS --     Time Calculation (min) 96 min  -HS --     PT Non-Billable Time (min) 26 min  -HS --     PT Received On 22  -HS --     PT - Next Appointment -- 22  -HS            Time Calculation- PT    Total Timed Code Minutes- PT 70 minute(s)  -HS --           User Key  (r) = Recorded By, (t) = Taken By, (c) = Cosigned By    Initials Name Provider Type    HS Shantell Vasquez, PT Physical Therapist              Therapy Charges for Today     Code Description Service Date Service Provider Modifiers Qty    63765790713 HC PT THERAPEUTIC ACT EA 15 MIN 9/3/2022 Shantell Vasquez PT GP 3    84391865860 HC PT SELF CARE/MGMT/TRAIN EA 15 MIN 9/3/2022 Shantell Vasquez PT GP 2    95529506519 HC PT EVAL MOD COMPLEXITY 2 9/3/2022 Shantell Vasquez PT GP 1          PT G-Codes  AM-PAC 6 Clicks Score (PT): 14    Shantell Vasquez PT  9/3/2022      Electronically signed by Shantell Vasquez PT at 22 1521         [facility] MEDICAL INPATIENT  [address] [place] [address]  Phone:  [phone]  Fax:  [phone] Date: Sep 3, 2022      Ambulatory Referral to Home Health (Hospital)     Patient:  Neeta Harrell MRN:  [id number]   [address] [facility] [place] [address] :  1945  SSN:    Phone: [phone] Sex:  F      INSURANCE PAYOR PLAN GROUP # SUBSCRIBER ID   Primary:  Secondary:    MEDICARE  [employer] SUP [id number]  [id number]      [id number]  [id number]      Referring Provider Information:  WILLI BEATTY Phone: [phone] Fax: [phone]       Referral Information:   # Visits:  999 Referral Type: Home Health [42]   Urgency:  Routine Referral Reason: Specialty Services Required   Start Date: Sep 3, 2022 End Date:  To be determined by Insurer   Diagnosis: Closed trimalleolar fracture of left ankle, initial encounter (S82.852A [ICD-10-CM] 824.6 [ICD-9-CM])      Refer to Dept:    Refer to Provider:   Refer to Provider Phone:   Refer to Facility:       Face to Face Visit Date: 9/3/2022  Follow-up provider for Plan of Care? I will be treating the patient on an ongoing basis.  Please send me the Plan of Care for signature.  Follow-up provider: HELENA HASTINGS [[id number]]  Reason/Clinical Findings: Deconditioning  Describe mobility limitations that make leaving home difficult: Deconditioning  Nursing/Therapeutic Services Requested: Skilled Nursing  Nursing/Therapeutic Services Requested: Physical Therapy  Nursing/Therapeutic Services Requested: Occupational Therapy  Skilled nursing orders: Medication education  Frequency: 1 Week 1     This document serves as a request of services and does not constitute Insurance authorization or approval of services.  To determine eligibility, please contact the members Insurance carrier to verify and review coverage.     If you have medical questions regarding this request for services. Please contact [address] MEDICAL INPATIENT at [phone] during normal business hours.        Authorizing Provider:Elder Matamoros DO  Authorizing Provider's NPI: [id number]  Order Entered By: Elder Matamoros DO 9/3/2022  5:30 PM     Electronically signed by: Elder Matamoros DO 9/3/2022  5:30 PM

## 2022-09-04 NOTE — NURSING NOTE
Patient not being discharged at this time due to Blood Sugar elevation.  Will continue to monitor.

## 2022-09-04 NOTE — PLAN OF CARE
Problem: Fall Injury Risk  Goal: Absence of Fall and Fall-Related Injury  Intervention: Identify and Manage Contributors  Recent Flowsheet Documentation  Taken 9/4/2022 0203 by Kerry Daniel RN  Medication Review/Management: medications reviewed  Taken 9/4/2022 0046 by Kerry Daniel RN  Medication Review/Management: medications reviewed  Intervention: Promote Injury-Free Environment  Recent Flowsheet Documentation  Taken 9/4/2022 0203 by Kerry Daniel RN  Safety Promotion/Fall Prevention:   assistive device/personal items within reach   clutter free environment maintained   fall prevention program maintained   lighting adjusted   nonskid shoes/slippers when out of bed   room organization consistent   safety round/check completed  Taken 9/4/2022 0046 by Kerry Daniel RN  Safety Promotion/Fall Prevention:   assistive device/personal items within reach   clutter free environment maintained   fall prevention program maintained   lighting adjusted   nonskid shoes/slippers when out of bed   room organization consistent   safety round/check completed     Problem: Adult Inpatient Plan of Care  Goal: Absence of Hospital-Acquired Illness or Injury  Intervention: Identify and Manage Fall Risk  Recent Flowsheet Documentation  Taken 9/4/2022 0203 by Kerry Daniel RN  Safety Promotion/Fall Prevention:   assistive device/personal items within reach   clutter free environment maintained   fall prevention program maintained   lighting adjusted   nonskid shoes/slippers when out of bed   room organization consistent   safety round/check completed  Taken 9/4/2022 0046 by Kerry Daniel RN  Safety Promotion/Fall Prevention:   assistive device/personal items within reach   clutter free environment maintained   fall prevention program maintained   lighting adjusted   nonskid shoes/slippers when out of bed   room organization consistent   safety round/check completed  Intervention: Prevent Skin Injury  Recent Flowsheet  Documentation  Taken 9/4/2022 0203 by Kerry Daniel RN  Body Position: weight shifting  Taken 9/4/2022 0046 by Kerry Daniel RN  Body Position: turned  Intervention: Prevent and Manage VTE (Venous Thromboembolism) Risk  Recent Flowsheet Documentation  Taken 9/4/2022 0203 by Kerry Daniel RN  Activity Management:   activity adjusted per tolerance   activity encouraged  Taken 9/4/2022 0046 by Kerry Daniel RN  Activity Management:   activity adjusted per tolerance   activity encouraged  Intervention: Prevent Infection  Recent Flowsheet Documentation  Taken 9/4/2022 0203 by Kerry Daniel RN  Infection Prevention:   personal protective equipment utilized   hand hygiene promoted  Taken 9/4/2022 0046 by Kerry Daniel RN  Infection Prevention:   hand hygiene promoted   personal protective equipment utilized     Problem: Skin Injury Risk Increased  Goal: Skin Health and Integrity  Intervention: Optimize Skin Protection  Recent Flowsheet Documentation  Taken 9/4/2022 0203 by Kerry Daniel RN  Head of Bed (HOB) Positioning: HOB elevated  Taken 9/4/2022 0046 by Kerry Daniel RN  Head of Bed (HOB) Positioning: HOB elevated     Problem: Hypertension Comorbidity  Goal: Blood Pressure in Desired Range  Intervention: Maintain Blood Pressure Management  Recent Flowsheet Documentation  Taken 9/4/2022 0203 by Kerry Daniel RN  Medication Review/Management: medications reviewed  Taken 9/4/2022 0046 by Kerry Daniel RN  Medication Review/Management: medications reviewed     Problem: Functional Ability Impaired (Orthopaedic Fracture)  Goal: Optimal Functional Ability  Intervention: Optimize Functional Ability  Recent Flowsheet Documentation  Taken 9/4/2022 0203 by Kerry Daneil RN  Activity Management:   activity adjusted per tolerance   activity encouraged  Activity Assistance Provided: assistance, 1 person  Positioning/Transfer Devices:   pillows   in use  Taken 9/4/2022 0046 by Kerry Daniel RN  Activity Management:   activity  adjusted per tolerance   activity encouraged  Activity Assistance Provided: assistance, 1 person  Positioning/Transfer Devices:   pillows   in use     Problem: Infection (Orthopaedic Fracture)  Goal: Absence of Infection Signs and Symptoms  Intervention: Prevent or Manage Infection  Recent Flowsheet Documentation  Taken 2022 0203 by Kerry Daniel, RN  Infection Prevention:   personal protective equipment utilized   hand hygiene promoted  Taken 2022 0046 by Kerry Daniel RN  Infection Prevention:   hand hygiene promoted   personal protective equipment utilized   Goal Outcome Evaluation:     Pt needs rolling walker and BSC prior to DC per PT. Blood glucose is 246 this AM. Pt is wanting to DC this AM to attend . Will continue to monitor BG.

## 2022-09-04 NOTE — PLAN OF CARE
Goal Outcome Evaluation:  Plan of Care Reviewed With: patient, spouse        Progress: improving  Outcome Evaluation: Pt was planned to discharge yesterday, but stayed overnight due to elevated blood sugar level. Sugar level trending down through the night, this morning was 255 at 7am. Dr Seth is aware. Pt is being dicharged per order.  at bedside, daughter will come and help. Pt's potassium is elevated, Dr Seth aware.

## 2022-09-04 NOTE — CASE MANAGEMENT/SOCIAL WORK
Continued Stay Note  [facility]     Patient Name: Neeta Harrell  MRN: [id number]  Today's Date: 9/4/2022    Admit Date: 9/1/2022     Discharge Plan     Row Name 09/04/22 0840       Plan    Plan Plan for home with spouse and [facility]; pending acceptance. New RW and BSC through [facility].    Patient/Family in Agreement with Plan yes    Plan Comments PT recommending home with PT/OT, RW, and BSC. Orders in place. Spoke with Jaylene and patient via phone for HH choices. 1) [facility] 2) [facility]. Referral placed and [facility] answering service notified. They will not be able to accept until Tuesday. RW and BSC delivered to patient and spouse at bedside via [facility].              Lee Ann Rivera RN  Weekend   [address] [address]  Office: [phone]  Fax: [phone]  [email or web address]

## 2022-09-04 NOTE — DISCHARGE PLACEMENT REQUEST
"Aramis Yeager (76 y.o. Female)             Date of Birth   1945    Social Security Number       Address   [address] [place] [address] [place] [facility]    Home Phone   [phone]    MRN   [id number]       Jehovah's witness   Roman Catholic    Marital Status                               Admission Date   9/1/22    Admission Type   Emergency    Admitting Provider   Elder Matamoros DO    Attending Provider   Elder Matamoros DO    Department, Room/Bed   [facility] 2B MEDICAL INPATIENT, 229/1       Discharge Date       Discharge Disposition   Home or Self Care    Discharge Destination                               Attending Provider: Elder Matamoros DO    Allergies: Ibuprofen    Isolation: None   Infection: None   Code Status: CPR   Advance Care Planning Activity    Ht: 165.1 cm (65\")   Wt: 90.3 kg (199 lb)    Admission Cmt: None   Principal Problem: Syncope [R55]                 Active Insurance as of 9/1/2022     Primary Coverage     Payor Plan Insurance Group Employer/Plan Group    MEDICARE MEDICARE A & B      Payor Plan Address Payor Plan Phone Number Payor Plan Fax Number Effective Dates    [address] [phone]  11/1/2012 - None Entered    [facility] [address]       Subscriber Name Subscriber Birth Date Member ID       ARAMIS YEAGER 1945 [id number]           Secondary Coverage     Payor Plan Insurance Group Employer/Plan Group    AARP MC SUP AARP HEALTH CARE OPTIONS      Payor Plan Address Payor Plan Phone Number Payor Plan Fax Number Effective Dates    [facility] [phone]  1/1/2021 - None Entered    [address]       [facility] [address]       Subscriber Name Subscriber Birth Date Member ID       ARAMIS YEAGER 1945 [id number]                 Emergency Contacts      (Rel.) Home Phone Work Phone Mobile Phone    SADAF BROWNING (Daughter) [phone] -- [phone]    KIM MAYER (Spouse) [phone] -- " --               History & Physical      Skye Contreras APRN at 22     Attestation signed by Elder Matamoros DO at 22 1114    I have reviewed this documentation and agree.    Electronically signed by Elder Matamoros DO, 22, 11:14 AM EDT.                       [facility] Medicine Services      Patient Name: Neeta Harrell  : 1945  MRN: [id number]  Primary Care Physician:  Landon Smith MD  Date of admission: 2022      Subjective      Chief Complaint: Syncope with left lower extremity injury    History of Present Illness: Neeta Harrell is a 76 y.o. female who presented to [facility] on 2022 complaining of syncope with left lower extremity injury during hypoglycemia crisis.  The patient has been under a lot of stress as her brother passed away yesterday.  She is tearful when discussing this at time of interview.  The patient and her sister were at the floral shop picking out flower arrangements for the  which is to be held on Monday when her continuous glucose monitor started reading that her blood sugar was rapidly dropping.  The patient had glucose tablets but opted for a Snickers instead and while she was eating it she suddenly lost consciousness and collapsed with brief loss of consciousness.  The patient fell on concrete floor and subsequently had significant left ankle pain.  The patient reports that her blood sugar is usually well controlled with few lows and believes the stress of losing her brother may have contributed to the low.  The patient denies recent subjective fever, chills, cough, chest pain, dyspnea with exertion, dysuria, or other constitutional symptoms.    In the emergency room the patient was noted to have trimalleolar fracture.  EKG was noted to show sinus bradycardia with rate 43.  Patient had also noted to be bradycardic per EMS.  The patient was also noted to have turbulent urine  with WBCs too many to count and 4+ bacteria.        Review of Systems   Constitutional: Positive for decreased appetite. Negative for chills and fever.   Cardiovascular: Positive for syncope. Negative for chest pain, dyspnea on exertion and palpitations.   Respiratory: Negative for cough and shortness of breath.    Gastrointestinal: Positive for nausea. Negative for abdominal pain and vomiting.   Genitourinary: Negative for dysuria.   All other systems reviewed and are negative.       Personal History     Past Medical History:   Diagnosis Date   • Arthritis    • CKD (chronic kidney disease)    • Diabetes mellitus (HCC)    • Hyperlipidemia    • Hypertension        Past Surgical History:   Procedure Laterality Date   •  SECTION      x2   • CHOLECYSTECTOMY     • HYSTERECTOMY     • INCONTINENCE SURGERY     • TONSILLECTOMY         Family History: family history includes Arrhythmia in her sister; Heart disease in her brother. Otherwise pertinent FHx was reviewed and not pertinent to current issue.    Social History:  reports that she has never smoked. She has never used smokeless tobacco. She reports that she does not drink alcohol and does not use drugs.    Home Medications:  Prior to Admission Medications     Prescriptions Last Dose Informant Patient Reported? Taking?    Accu-Chek Softclix Lancets lancets   Yes No    1 each 3 (Three) Times a Day. Test BG TID    alendronate (FOSAMAX) 70 MG tablet   Yes No    Take 1 tablet by mouth Daily.    amLODIPine (NORVASC) 10 MG tablet   Yes No    Take 10 mg by mouth Daily.    aspirin 81 MG EC tablet   Yes No    Take 1 tablet by mouth Every Other Day.    atorvastatin (LIPITOR) 40 MG tablet   Yes No    Take 1 tablet by mouth Daily.    carvedilol (COREG) 12.5 MG tablet   No No    TAKE 1 TABLET BY MOUTH TWICE DAILY WITH MEALS    cholecalciferol (VITAMIN D3) 25 MCG (1000 UT) tablet   Yes No    Take 2,000 Units by mouth Daily.    Cholecalciferol 50 MCG (2000 UT) tablet   Yes No     Take 1 tablet by mouth Daily.    ferrous sulfate 324 (65 Fe) MG tablet delayed-release EC tablet   Yes No    Take 324 mg by mouth Daily With Breakfast.    glucagon (GLUCAGEN) 1 MG injection   Yes No    Inject 1 mg into the appropriate muscle as directed by prescriber 1 (One) Time As Needed.    glucose blood test strip   Yes No    1 each by Other route 3 (Three) Times a Day As Needed.    insulin aspart (NovoLOG FlexPen) 100 UNIT/ML solution pen-injector sc pen   Yes No    Inject 10 Units under the skin into the appropriate area as directed 3 (Three) Times a Day With Meals.    insulin detemir (Levemir FlexTouch) 100 UNIT/ML injection   Yes No    Inject 18 Units under the skin into the appropriate area as directed Every Night.    losartan (COZAAR) 100 MG tablet   Yes No    Take 1 tablet by mouth Daily.    Omega-3 Fatty Acids (fish oil) 1000 MG capsule capsule   Yes No    Take 1,000 mg by mouth 2 (Two) Times a Day With Meals.    pantoprazole (PROTONIX) 40 MG EC tablet   Yes No    Take 40 mg by mouth 2 (two) times a day.    SITagliptin (JANUVIA) 25 MG tablet   Yes No    Take 25 mg by mouth Daily.    sodium bicarbonate 650 MG tablet   Yes No    Take 650 mg by mouth 3 (Three) Times a Day.    sodium bicarbonate 650 MG tablet   Yes No    Take 1 tablet by mouth 3 (Three) Times a Day.    vitamin E 1000 UNIT capsule   Yes No    Take 1,000 Units by mouth Daily.    Zinc 50 MG capsule   Yes No    Take 1 tablet by mouth Daily.            Allergies:  Allergies   Allergen Reactions   • Ibuprofen Other (See Comments) and Nausea And Vomiting     Kidney and liver problems       Objective      Vitals:   Temp:  [97.4 °F (36.3 °C)-97.6 °F (36.4 °C)] 97.6 °F (36.4 °C)  Heart Rate:  [44-62] 61  Resp:  [15-18] 18  BP: (121-163)/(52-76) 161/76    Physical Exam  Vitals and nursing note reviewed.   Constitutional:       Appearance: Normal appearance. She is not ill-appearing.   HENT:      Head: Normocephalic and atraumatic.      Right Ear:  External ear normal.      Left Ear: External ear normal.      Nose: Nose normal.      Mouth/Throat:      Mouth: Mucous membranes are moist.   Eyes:      General: No scleral icterus.        Right eye: No discharge.         Left eye: No discharge.      Extraocular Movements: Extraocular movements intact.      Conjunctiva/sclera: Conjunctivae normal.      Pupils: Pupils are equal, round, and reactive to light.   Cardiovascular:      Rate and Rhythm: Normal rate and regular rhythm.      Pulses: Normal pulses.      Heart sounds: Normal heart sounds. No murmur heard.  Pulmonary:      Breath sounds: Normal breath sounds.   Abdominal:      General: Bowel sounds are normal.      Palpations: Abdomen is soft.   Musculoskeletal:         General: Signs of injury present. Normal range of motion.      Cervical back: Normal range of motion and neck supple.      Right lower leg: No edema.      Left lower leg: Edema present.      Comments: Splint in place with good cap refill of the exposed toes   Skin:     General: Skin is warm and dry.      Capillary Refill: Capillary refill takes less than 2 seconds.   Neurological:      General: No focal deficit present.      Mental Status: She is alert and oriented to person, place, and time.   Psychiatric:         Mood and Affect: Mood normal.         Behavior: Behavior normal.         Thought Content: Thought content normal.         Judgment: Judgment normal.          Result Review    Result Review:  I have personally reviewed the results from the time of this admission to 9/1/2022 20:56 EDT and agree with these findings:  [x]  Laboratory  [x]  Microbiology  [x]  Radiology  [x]  EKG/Telemetry   [x]  Cardiology/Vascular   []  Pathology  [x]  Old records  []  Other:  Most notable findings include:     Assessment & Plan        Active Hospital Problems:  Active Hospital Problems    Diagnosis    • **Syncope    • Hypoglycemia    • Bradycardia    • Closed trimalleolar fracture of left ankle    • Type  2 diabetes mellitus with diabetic nephropathy (HCC)    • GERD without esophagitis    • Hyperlipidemia    • Acute UTI (urinary tract infection)      Plan:     Bradycardia with syncope and colapse--likely multifactorial with hypoglycemia, nausea, and chronic beta-blocker: Hold Coreg; cardiology consult; echocardiogram; serial troponin  -No chest pain  -Hx of tachy arrhythmia; followed by Dr. Ellis  --Of note, the patient is adamant that she is not going to miss her brother's  and wants to be out of the hospital by  even if that will delay her ankle surgery    Acute hypoglycemia--likely secondary to loss of appetite due to emotional stress; consideration for UTI: Hold oral hypoglycemic medication; decrease Lantus dose; check blood glucose at 2 AM    UTI, asymptomatic bacteriuria and elevated WBC in a diabetic patient: IV Rocephin x4 days    Trimalleolar fracture status post fall: Orthopedic consult  -Of note, the patient is adamant that she is not going to miss her brother's  and wants to be out of the hospital by  even if that will delay her ankle surgery    Diabetes type 2, chronic hold mealtime insulin; continue Levemir with dose decreased to 70% of home dose; hold Januvia    Osteoporosis, chronic: Hold vitamin D; hold Fosamax    CKD stage IV, chronic, creatinine 2.01 which is at baseline: Continue sodium bicarb    Hypertension, chronic with mildly elevated blood pressure with cardiovascular prophylaxis: Continue Norvasc; hold Coreg; hold aspirin; continue Cozaar    HLD, chronic: Continue Lipitor    Anemia, chronic: Continue ferrous sulfate    GERD, chronic: Continue Protonix    Anemia, mild, chronic, hemoglobin 11.9--likely secondary to stage IV CKD: Repeat CBC in a.m.    DVT prophylaxis:  Medical DVT prophylaxis orders are present.    CODE STATUS:       Admission Status:  I believe this patient meets observation status.    I discussed the patient's findings and my recommendations with  patient, family and nursing staff.    This patient has been examined wearing appropriate Personal Protective Equipment. 09/01/22      Signature: Electronically signed by DEREJE Phillips, 09/01/22, 10:27 PM EDT.      Electronically signed by Elder Matamoros DO at 09/02/22 1114          Physician Progress Notes (last 24 hours)      Cesar Ellis MD at 09/03/22 1217          Referring Provider: Hospitalist    Reason for follow-up: Syncope     Patient Care Team:  Landon Smtih MD as PCP - General  Landon Smith MD as PCP - Family Medicine  Cesar Ellis MD as Consulting Physician (Cardiology)    Subjective .  Patient feeling better today without any symptoms    Objective  Lying in bed comfortably     Review of Systems   Constitutional: Negative for fever and malaise/fatigue.   Cardiovascular: Negative for chest pain, dyspnea on exertion and palpitations.   Respiratory: Negative for cough and shortness of breath.    Skin: Negative for rash.   Gastrointestinal: Negative for abdominal pain, nausea and vomiting.   Neurological: Negative for focal weakness and headaches.   All other systems reviewed and are negative.      Ibuprofen    Scheduled Meds:amLODIPine, 10 mg, Oral, Daily  aspirin, 81 mg, Oral, Every Other Day  atorvastatin, 40 mg, Oral, Daily  cefTRIAXone, 2 g, Intravenous, Q24H  ferrous sulfate, 324 mg, Oral, Daily With Breakfast  insulin glargine, 18 Units, Subcutaneous, Nightly  insulin lispro, 0-7 Units, Subcutaneous, TID AC  losartan, 100 mg, Oral, Daily  pantoprazole, 40 mg, Oral, BID AC  polyethylene glycol, 17 g, Oral, Daily  sodium bicarbonate, 650 mg, Oral, TID  sodium chloride, 10 mL, Intravenous, Q12H      Continuous Infusions:sodium chloride, 100 mL/hr, Last Rate: 100 mL/hr (09/03/22 0509)  sodium chloride, 75 mL/hr  sodium chloride, 125 mL/hr, Last Rate: 125 mL/hr (09/03/22 1034)      PRN Meds:.•  acetaminophen **OR** acetaminophen **OR** acetaminophen  •  ALPRAZolam  •   "dextrose  •  dextrose  •  glucagon (human recombinant)  •  hydrALAZINE  •  HYDROcodone-acetaminophen  •  Morphine **AND** naloxone  •  ondansetron **OR** ondansetron  •  oxyCODONE  •  sodium chloride  •  [COMPLETED] Insert peripheral IV **AND** sodium chloride  •  sodium chloride        VITAL SIGNS  Vitals:    09/03/22 0928 09/03/22 0943 09/03/22 0957 09/03/22 1015   BP: 158/54 160/50 149/57 153/71   BP Location:   Left arm Left arm   Patient Position:   Lying Lying   Pulse: 76 81 82 81   Resp: 14 16 15 16   Temp:   97.7 °F (36.5 °C) 97.6 °F (36.4 °C)   TempSrc:   Temporal Oral   SpO2: 98% 99% 96% 97%   Weight:       Height:           Flowsheet Rows    Flowsheet Row First Filed Value   Admission Height 165.1 cm (65\") Documented at 09/01/2022 1409   Admission Weight 88.9 kg (196 lb) Documented at 09/01/2022 1409           TELEMETRY: Sinus rhythm    Physical Exam:  Constitutional:       Appearance: Well-developed.   Eyes:      General: No scleral icterus.     Conjunctiva/sclera: Conjunctivae normal.   HENT:      Head: Normocephalic and atraumatic.   Neck:      Vascular: No carotid bruit or JVD.   Pulmonary:      Effort: Pulmonary effort is normal.      Breath sounds: Normal breath sounds. No wheezing. No rales.   Cardiovascular:      Normal rate. Regular rhythm.   Pulses:     Intact distal pulses.   Abdominal:      General: Bowel sounds are normal.      Palpations: Abdomen is soft.   Musculoskeletal:      Cervical back: Normal range of motion and neck supple. Skin:     General: Skin is warm and dry.      Findings: No rash.   Neurological:      Mental Status: Alert.          Results Review:   I reviewed the patient's new clinical results.  Lab Results (last 24 hours)     Procedure Component Value Units Date/Time    POC Glucose Once [[id number]]  (Abnormal) Collected: 09/03/22 1144    Specimen: Blood Updated: 09/03/22 1146     Glucose 203 mg/dL      Comment: Serial Number: [id number]:  [id number]       Basic " Metabolic Panel [[id number]]  (Abnormal) Collected: 09/03/22 1023    Specimen: Blood Updated: 09/03/22 1057     Glucose 191 mg/dL      BUN 30 mg/dL      Creatinine 1.76 mg/dL      Sodium 138 mmol/L      Potassium 4.9 mmol/L      Chloride 106 mmol/L      CO2 21.0 mmol/L      Calcium 8.1 mg/dL      BUN/Creatinine Ratio 17.0     Anion Gap 11.0 mmol/L      eGFR 30.1 mL/min/1.73      Comment: National Kidney Foundation and American Society of Nephrology (ASN) Task Force recommended calculation based on the Chronic Kidney Disease Epidemiology Collaboration (CKD-EPI) equation refit without adjustment for race.       Narrative:      GFR Normal >60  Chronic Kidney Disease <60  Kidney Failure <15      Blood Culture - Blood, Hand, Right [[id number]] Collected: 09/03/22 1023    Specimen: Blood from Hand, Right Updated: 09/03/22 1032    POC Glucose Once [[id number]]  (Abnormal) Collected: 09/03/22 1021    Specimen: Blood Updated: 09/03/22 1022     Glucose 190 mg/dL      Comment: Serial Number: [id number]:  [id number]       POC Glucose Once [[id number]]  (Abnormal) Collected: 09/03/22 0916    Specimen: Blood Updated: 09/03/22 0917     Glucose 150 mg/dL      Comment: Serial Number: [id number]:  [id number]       Basic Metabolic Panel [[id number]]  (Abnormal) Collected: 09/03/22 0714    Specimen: Blood Updated: 09/03/22 0830     Glucose 165 mg/dL      BUN 32 mg/dL      Creatinine 1.77 mg/dL      Sodium 137 mmol/L      Potassium 4.7 mmol/L      Comment: Slight hemolysis detected by analyzer. Results may be affected.        Chloride 105 mmol/L      CO2 21.0 mmol/L      Calcium 8.5 mg/dL      BUN/Creatinine Ratio 18.1     Anion Gap 11.0 mmol/L      eGFR 29.9 mL/min/1.73      Comment: National Kidney Foundation and American Society of Nephrology (ASN) Task Force recommended calculation based on the Chronic Kidney Disease Epidemiology Collaboration (CKD-EPI) equation refit without adjustment for race.       Narrative:      GFR  Normal >60  Chronic Kidney Disease <60  Kidney Failure <15      Urinalysis, Microscopic Only - Urine, Clean Catch [[id number]]  (Abnormal) Collected: 09/03/22 0714    Specimen: Urine, Clean Catch Updated: 09/03/22 0824     RBC, UA 0-2 /HPF      WBC, UA Too Numerous to Count /HPF      Bacteria, UA None Seen /HPF      Squamous Epithelial Cells, UA 0-2 /HPF      Hyaline Casts, UA None Seen /LPF      Methodology Automated Microscopy    Urinalysis With Culture If Indicated - Urine, Clean Catch [[id number]]  (Abnormal) Collected: 09/03/22 0714    Specimen: Urine, Clean Catch Updated: 09/03/22 0824     Color, UA Yellow     Appearance, UA Clear     pH, UA <=5.0     Specific Gravity, UA 1.012     Glucose,  mg/dL (Trace)     Ketones, UA Negative     Bilirubin, UA Negative     Blood, UA Negative     Protein, UA Trace     Leuk Esterase, UA Moderate (2+)     Nitrite, UA Negative     Urobilinogen, UA 0.2 E.U./dL    Narrative:      In absence of clinical symptoms, the presence of pyuria, bacteria, and/or nitrites on the urinalysis result does not correlate with infection.    Urine Culture - Urine, Urine, Clean Catch [[id number]] Collected: 09/03/22 0714    Specimen: Urine, Clean Catch Updated: 09/03/22 0824    CBC & Differential [[id number]]  (Abnormal) Collected: 09/03/22 0714    Specimen: Blood Updated: 09/03/22 0812    Narrative:      The following orders were created for panel order CBC & Differential.  Procedure                               Abnormality         Status                     ---------                               -----------         ------                     CBC Auto Differential[[id number]]        Abnormal            Final result                 Please view results for these tests on the individual orders.    CBC Auto Differential [[id number]]  (Abnormal) Collected: 09/03/22 0714    Specimen: Blood Updated: 09/03/22 0812     WBC 9.20 10*3/mm3      RBC 3.67 10*6/mm3      Hemoglobin 11.0 g/dL      Hematocrit  34.2 %      MCV 93.4 fL      MCH 29.9 pg      MCHC 32.0 g/dL      RDW 13.6 %      RDW-SD 44.2 fl      MPV 8.3 fL      Platelets 258 10*3/mm3      Neutrophil % 70.1 %      Lymphocyte % 17.2 %      Monocyte % 10.6 %      Eosinophil % 1.6 %      Basophil % 0.5 %      Neutrophils, Absolute 6.50 10*3/mm3      Lymphocytes, Absolute 1.60 10*3/mm3      Monocytes, Absolute 1.00 10*3/mm3      Eosinophils, Absolute 0.10 10*3/mm3      Basophils, Absolute 0.00 10*3/mm3      nRBC 0.0 /100 WBC     POC Glucose Once [[id number]]  (Abnormal) Collected: 09/03/22 0537    Specimen: Blood Updated: 09/03/22 0541     Glucose 146 mg/dL      Comment: Serial Number: [id number]:  [id number]       POC Glucose Once [[id number]]  (Abnormal) Collected: 09/02/22 2032    Specimen: Blood Updated: 09/02/22 2033     Glucose 200 mg/dL      Comment: Serial Number: [id number]:  [id number]       Blood Culture - Blood, Arm, Left [[id number]]  (Normal) Collected: 09/01/22 1848    Specimen: Blood from Arm, Left Updated: 09/02/22 1902     Blood Culture No growth at 24 hours    Blood Culture - Blood, Arm, Right [[id number]]  (Normal) Collected: 09/01/22 1848    Specimen: Blood from Arm, Right Updated: 09/02/22 1902     Blood Culture No growth at 24 hours    POC Glucose Once [[id number]]  (Abnormal) Collected: 09/02/22 1707    Specimen: Blood Updated: 09/02/22 1708     Glucose 273 mg/dL      Comment: Serial Number: [id number]:  [id number]             Imaging Results (Last 24 Hours)     Procedure Component Value Units Date/Time    FL C Arm During Surgery [[id number]] Resulted: 09/03/22 0900     Updated: 09/03/22 0916    XR Ankle 2 View Left [[id number]] Resulted: 09/03/22 0900     Updated: 09/03/22 0900          EKG      I personally viewed and interpreted the patient's EKG/Telemetry data:    ECHOCARDIOGRAM:    STRESS MYOVIEW:    CARDIAC CATHETERIZATION:    OTHER:         Assessment & Plan     Principal Problem:    Syncope  Active Problems:     Hypoglycemia    Type 2 diabetes mellitus with diabetic nephropathy (HCC)    Bradycardia    Closed trimalleolar fracture of left ankle    Hyperlipidemia    GERD without esophagitis    Acute UTI (urinary tract infection)    Closed trimalleolar fracture of left ankle, initial encounter       Patient presented with a syncopal episode but was probably schedule hypoglycemia  Patient is on a monitor and does not have any arrhythmias  Patient had ankle fracture for which she had surgery today  Patient is currently stable and will be discharged to home and followed as an outpatient    I discussed the patients findings and my recommendations with patient and nurse    Cesar Ellis MD  22  12:17 EDT                Electronically signed by Cesar Ellis MD at 22 1219     [facility] ArrowElder Romero DO at 22 1009              [facility] Medicine Services Daily Progress Note    Patient Name: Neeta Harrell  : 1945  MRN: [id number]  Primary Care Physician:  Landon Smith MD  Date of admission: 2022      Subjective      Chief Complaint: Leg pain      Patient Reports     22: Left leg pain.  Will order Xanax for anxiety.    9/3/22: Planned ankle repair today.    Review of Systems   All other systems reviewed and are negative.        Objective      Vitals:   Temp:  [97.7 °F (36.5 °C)-98.3 °F (36.8 °C)] 97.7 °F (36.5 °C)  Heart Rate:  [60-98] 82  Resp:  [11-21] 15  BP: (149-189)/(45-72) 149/57  Flow (L/min):  [2-4] 2    Physical Exam  HENT:      Head: Normocephalic.      Nose: Nose normal.   Eyes:      Extraocular Movements: Extraocular movements intact.      Pupils: Pupils are equal, round, and reactive to light.   Cardiovascular:      Rate and Rhythm: Normal rate and regular rhythm.   Pulmonary:      Effort: Pulmonary effort is normal.   Abdominal:      General: Bowel sounds are normal.   Musculoskeletal:      Cervical back: Normal range of motion.      Comments:  Decreased range of motion left leg   Skin:     General: Skin is warm.   Neurological:      Mental Status: She is alert. Mental status is at baseline.   Psychiatric:         Mood and Affect: Mood normal.             Result Review    Result Review:  I have personally reviewed the results from the time of this admission to 9/3/2022 10:09 EDT and agree with these findings:  [x]  Laboratory  []  Microbiology  [x]  Radiology  []  EKG/Telemetry   []  Cardiology/Vascular   []  Pathology  [x]  Old records  []  Other:      Wounds (last 24 hours)     LDA Wound     Row Name 09/03/22 0957 09/03/22 0943 09/03/22 0928       Wound 09/03/22 0834 Left lateral ankle Incision    Wound - Properties Group Placement Date: 09/03/22 -JY Placement Time: 0834  -JY Present on Hospital Admission: N  -JY Side: Left  -JY Orientation: lateral  -JY Location: ankle  -JY Primary Wound Type: Incision  -JY    Dressing Appearance dry;intact;no drainage  -AV dry;intact;no drainage  -AV dry;intact;no drainage  -AV    Closure KATHERINE  -AV KATHERINE  -AV KATHERINE  -AV    Retired Wound - Properties Group Placement Date: 09/03/22 -JY Placement Time: 0834  -JY Present on Hospital Admission: N  -JY Side: Left  -JY Orientation: lateral  -JY Location: ankle  -JY Primary Wound Type: Incision  -JY    Retired Wound - Properties Group Date first assessed: 09/03/22 -JY Time first assessed: 0834  -JY Present on Hospital Admission: N  -JY Side: Left  -JY Location: ankle  -JY Primary Wound Type: Incision  -JY    Row Name 09/03/22 0913 09/03/22 0854          Wound 09/03/22 0834 Left lateral ankle Incision    Wound - Properties Group Placement Date: 09/03/22 -JY Placement Time: 0834  -JY Present on Hospital Admission: N  -JY Side: Left  -JY Orientation: lateral  -JY Location: ankle  -JY Primary Wound Type: Incision  -JY     Dressing Appearance dry;intact;no drainage  -AV --     Closure KATHERINE  -AV --     Dressing Care -- dressing applied  xeroform,4x4's, webril, orthoglass splint, ace   -JY     Retired Wound - Properties Group Placement Date: 09/03/22  -JY Placement Time: 0834 -JY Present on Hospital Admission: N  -JY Side: Left  -JY Orientation: lateral  -JY Location: ankle  -JY Primary Wound Type: Incision  -JY     Retired Wound - Properties Group Date first assessed: 09/03/22 -JY Time first assessed: 0834 -JY Present on Hospital Admission: N  -JY Side: Left  -JY Location: ankle  -JY Primary Wound Type: Incision  -JY           User Key  (r) = Recorded By, (t) = Taken By, (c) = Cosigned By    Initials Name Provider Type    Amina Jarvis, RN Registered Nurse    Aisha Daugherty RN Registered Nurse                  Assessment & Plan      Brief Patient Summary:      amLODIPine, 10 mg, Oral, Daily  [MAR Hold] atorvastatin, 40 mg, Oral, Daily  cefTRIAXone, 2 g, Intravenous, Q24H  [MAR Hold] enoxaparin, 30 mg, Subcutaneous, Daily  [MAR Hold] ferrous sulfate, 324 mg, Oral, Daily With Breakfast  [MAR Hold] insulin glargine, 18 Units, Subcutaneous, Nightly  [MAR Hold] insulin lispro, 0-7 Units, Subcutaneous, TID AC  losartan, 100 mg, Oral, Daily  [MAR Hold] pantoprazole, 40 mg, Oral, BID AC  [MAR Hold] sodium bicarbonate, 650 mg, Oral, TID  [MAR Hold] sodium chloride, 10 mL, Intravenous, Q12H       sodium chloride, 100 mL/hr, Last Rate: 100 mL/hr (09/03/22 0509)  sodium chloride, 75 mL/hr         Active Hospital Problems:  Active Hospital Problems    Diagnosis    • **Syncope    • Closed trimalleolar fracture of left ankle, initial encounter    • Hypoglycemia    • Bradycardia    • Closed trimalleolar fracture of left ankle    • Hyperlipidemia    • GERD without esophagitis    • Acute UTI (urinary tract infection)    • Type 2 diabetes mellitus with diabetic nephropathy (HCC)      Syncope due to hypoglycemia complicated with trimalleolar fracture:  -s/p CT head /cervical spine in ED  -Orthopedic surgery consulted--> plan for repair 9/3/2022    UTI:  -Continue Rocephin    Anxiety:  -Xanax  "PRN    Type 2 diabetes mellitus:  -Continue Lantus at reduced dose and ISS  -Hold home Januvia    CKD stage IV:  -Nephrotoxin holds  -Continue sodium bicarb    Hypertension:  -Continue home Norvasc, Coreg and Cozaar when blood pressure appropriate    Hyperlipidemia:  -Continue Lipitor    GERD:  -Protonix    Normocytic anemia:  -Monitor H&H        DVT prophylaxis:  Medical DVT prophylaxis orders are present.    CODE STATUS:    Level Of Support Discussed With: Patient  Code Status (Patient has no pulse and is not breathing): CPR (Attempt to Resuscitate)  Medical Interventions (Patient has pulse or is breathing): Full Support      Disposition:  I expect patient to be discharged defer.    This patient has been examined wearing appropriate Personal Protective Equipment and discussed with nursing. 22      Electronically signed by Elder Matamoros DO, 22, 10:09 EDT.  [facility] Hospitalist Team             Electronically signed by Elder Matamoros DO at 22 1010                 [facility] 2B MEDICAL INPATIENT  [address] [place] [address]  Dept. Phone:  [phone]  Dept. Fax:  [phone] Date Ordered: Sep 4, 2022         Patient:  Neeta Harrell MRN:  [id number]   [address] [place] [address] :  1945  SSN:    Phone: [phone] Sex:  F     Weight: 90.3 kg (199 lb)         Ht Readings from Last 1 Encounters:   22 165.1 cm (65\")         Commode Chair          (Order ID: [id number])    Diagnosis:  Closed trimalleolar fracture of left ankle, initial encounter (S82.852A [ICD-10-CM] 824.6 [ICD-9-CM])   Quantity:  1     Equipment:  Bedside Commode Chair w/Fixed Arms  Length of Need (99 Months = Lifetime): 15 Months        Authorizing Provider's Phone: [phone]  Authorizing Provider:Elder Matamoros DO  Authorizing Provider's NPI: [id number]  Order Entered By: Elder Matamoros DO 2022  7:38 " "AM     Electronically signed by: Elder Matamoros DO 2022  7:38 AM             [facility] 2B MEDICAL INPATIENT  [address] [place] [address]  Dept. Phone:  [phone]  Dept. Fax:  [phone] Date Ordered: Sep 4, 2022         Patient:  Neeta Harrell MRN:  [id number]   [address] [facility] [place] [address] :  1945  SSN:    Phone: [phone] Sex:  F     Weight: 90.3 kg (199 lb)         Ht Readings from Last 1 Encounters:   22 165.1 cm (65\")         Walker               (Order ID: [id number])    Diagnosis:  Closed trimalleolar fracture of left ankle, initial encounter (S82.852A [ICD-10-CM] 824.6 [ICD-9-CM])   Quantity:  1     Equipment:  Walker Folding with Wheels  Length of Need (99 Months = Lifetime): 15 Months        Authorizing Provider's Phone: [phone]  Authorizing Provider:Elder Matamoros DO  Authorizing Provider's NPI: [id number]  Order Entered By: Elder Matamoros DO 2022  7:38 AM     Electronically signed by: Elder Matamoros DO 2022  7:38 AM         "

## 2022-09-05 ENCOUNTER — APPOINTMENT (OUTPATIENT)
Dept: VASCULAR SURGERY | Facility: HOSPITAL | Age: 77
End: 2022-09-05

## 2022-09-05 NOTE — OUTREACH NOTE
Prep Survey    Flowsheet Row Responses   [facility] facility patient discharged from? [facility]   Is LACE score < 7 ? No   Emergency Room discharge w/ pulse ox? No   Eligibility Readm Mgmt   Discharge diagnosis Syncope   Does the patient have one of the following disease processes/diagnoses(primary or secondary)? Other   Does the patient have Home health ordered? Yes   What is the Home health agency?  [facility]   Prep survey completed? Yes          TOBY CADENA - Registered Nurse

## 2022-09-06 ENCOUNTER — TELEPHONE (OUTPATIENT)
Dept: DIABETES SERVICES | Facility: HOSPITAL | Age: 77
End: 2022-09-06

## 2022-09-06 LAB
BACTERIA SPEC AEROBE CULT: NORMAL
BACTERIA SPEC AEROBE CULT: NORMAL

## 2022-09-06 NOTE — TELEPHONE ENCOUNTER
Pt states she did fill the rx for the Gvoke pen and she has at home. Pt has no questions for educator at this time.

## 2022-09-06 NOTE — CASE MANAGEMENT/SOCIAL WORK
Case Management Discharge Note      Final Note: Home with [facility]         Selected Continued Care - Discharged on 9/4/2022 Admission date: 9/1/2022 - Discharge disposition: Home or Self Care       Home Medical Care Coordination complete.    Service Provider Selected Services Address Phone Fax Patient Preferred    [facility]  Home Health Services [address] [place] [address] [phone] --         Transportation Services  Private: Car    Final Discharge Disposition Code: 06 - home with home health care

## 2022-09-07 ENCOUNTER — READMISSION MANAGEMENT (OUTPATIENT)
Dept: CALL CENTER | Facility: HOSPITAL | Age: 77
End: 2022-09-07

## 2022-09-07 NOTE — OUTREACH NOTE
Medical Week 1 Survey    Flowsheet Row Responses   [facility] patient discharged from? [facility]   Does the patient have one of the following disease processes/diagnoses(primary or secondary)? Other   Week 1 attempt successful? Yes   Call start time 1250   Call end time 1256   Discharge diagnosis Syncope   Meds reviewed with patient/caregiver? Yes   Is the patient having any side effects they believe may be caused by any medication additions or changes? No   Does the patient have all medications ordered at discharge? Yes   Is the patient taking all medications as directed (includes completed medication regime)? Yes   Comments regarding appointments Ortho appt on 9/12/22 for staple removal   Does the patient have a primary care provider?  Yes   Does the patient have an appointment with their PCP within 7 days of discharge? Greater than 7 days   Comments regarding PCP 10/17/22   What is preventing the patient from scheduling follow up appointments within 7 days of discharge? --  [Pt reports PCP rescheduled appt to give pt's ankle more time to heal]   Nursing Interventions Verified appointment date/time/provider   Has the patient kept scheduled appointments due by today? N/A   What is the Home health agency?  [facility]   Has home health visited the patient within 72 hours of discharge? Yes   What DME was ordered? SEEMA Estevez   Has all DME been delivered? Yes   Psychosocial issues? No   Did the patient receive a copy of their discharge instructions? Yes   Nursing interventions Reviewed instructions with patient   What is the patient's perception of their health status since discharge? Improving   Is the patient/caregiver able to teach back the hierarchy of who to call/visit for symptoms/problems? PCP, Specialist, Home health nurse, Urgent Care, ED, 911 Yes   Week 1 call completed? Yes   Graduated Yes   Wrap up additional comments Pt reports feeling better, taking it easy, staying off her  ankle, blood sugars have been ranging between 120's-180's. No questions/concerns at this time          AZAEL BUSTILLOS - Registered Nurse

## 2022-09-08 ENCOUNTER — TELEPHONE (OUTPATIENT)
Dept: CARDIOLOGY | Facility: CLINIC | Age: 77
End: 2022-09-08

## 2022-09-08 LAB — BACTERIA SPEC AEROBE CULT: NORMAL

## 2022-09-08 NOTE — TELEPHONE ENCOUNTER
Home health called, Neeta's Carvedilol just went from 12.5mg to 6.5mg her blood pressure is currently 200/80 no other symptoms. Please advise.

## 2022-09-08 NOTE — PROGRESS NOTES
Enter Query Response Below      Query Response:              If applicable, please update the problem list.     Patient: Neeta Harrell        : 1945  Account: [id number]           Admit Date: 2022        Options to Respond to Query:    1. Access the Encounter     a. From the To-Do Side bar, click Respond With Note.     b. Click New Note     c. Answer query within the yellow box.                d. Update the Problem List if applicable.     Dr. Galarza,     76-year-old female presented to ED with left trimalleolar ankle fracture sustained after she became dizzy from low blood sugar and sustained a fall. Patient with PMH of osteoporosis on Fosamax and vitamin D, which were held during this admission.     Please clarify the patient’s left trimalleolar ankle fracture as:     -traumatic fracture  -pathologic fracture  -other (please specify): _____  -unable to clinically determine    By submitting this query, we are merely seeking further clarification of documentation to accurately reflect all conditions that you are monitoring, evaluating, treating or that extend the hospitalization or utilize additional resources of care. Please utilize your independent clinical judgment when addressing the question(s) above.     This query and your response, once completed, will be entered into the legal medical record.    Sincerely,  Wendy Hansen  Clinical Documentation Integrity Program   [email or web address]    Patient had traumatic fracture

## 2022-09-09 RX ORDER — AMLODIPINE BESYLATE 5 MG/1
5 TABLET ORAL DAILY
Qty: 90 TABLET | Refills: 3 | Status: SHIPPED | OUTPATIENT
Start: 2022-09-09

## 2022-09-09 NOTE — TELEPHONE ENCOUNTER
Rx Refill Note  Requested Prescriptions     Signed Prescriptions Disp Refills   • amLODIPine (NORVASC) 5 MG tablet 90 tablet 3     Sig: Take 1 tablet by mouth Daily.     Authorizing Provider: CHRISTOPHER MCDONALD     Ordering User: MARK EDWARDS      Last office visit with prescribing clinician: 7/14/2022      Next office visit with prescribing clinician: 1/17/2023            Mark Edwards MA  09/09/22, 08:30 EDT

## 2022-09-12 ENCOUNTER — APPOINTMENT (OUTPATIENT)
Dept: VASCULAR SURGERY | Facility: HOSPITAL | Age: 77
End: 2022-09-12

## 2022-12-02 ENCOUNTER — TRANSCRIBE ORDERS (OUTPATIENT)
Dept: ADMINISTRATIVE | Facility: HOSPITAL | Age: 77
End: 2022-12-02

## 2022-12-02 DIAGNOSIS — N18.6 END STAGE RENAL DISEASE: Primary | ICD-10-CM

## 2023-01-16 ENCOUNTER — APPOINTMENT (OUTPATIENT)
Dept: CARDIOLOGY | Facility: HOSPITAL | Age: 78
End: 2023-01-16

## 2023-01-16 ENCOUNTER — APPOINTMENT (OUTPATIENT)
Dept: VASCULAR SURGERY | Facility: HOSPITAL | Age: 78
End: 2023-01-16

## 2023-01-17 ENCOUNTER — OFFICE VISIT (OUTPATIENT)
Dept: CARDIOLOGY | Facility: CLINIC | Age: 78
End: 2023-01-17
Payer: MEDICARE

## 2023-01-17 VITALS
DIASTOLIC BLOOD PRESSURE: 52 MMHG | HEIGHT: 65 IN | BODY MASS INDEX: 32.99 KG/M2 | SYSTOLIC BLOOD PRESSURE: 133 MMHG | WEIGHT: 198 LBS | HEART RATE: 54 BPM | OXYGEN SATURATION: 98 %

## 2023-01-17 DIAGNOSIS — E78.00 PURE HYPERCHOLESTEROLEMIA: ICD-10-CM

## 2023-01-17 DIAGNOSIS — I10 ESSENTIAL HYPERTENSION: Primary | ICD-10-CM

## 2023-01-17 DIAGNOSIS — R00.2 PALPITATIONS: ICD-10-CM

## 2023-01-17 DIAGNOSIS — E11.9 TYPE 2 DIABETES MELLITUS WITHOUT COMPLICATION, WITHOUT LONG-TERM CURRENT USE OF INSULIN: ICD-10-CM

## 2023-01-17 PROCEDURE — 99214 OFFICE O/P EST MOD 30 MIN: CPT | Performed by: INTERNAL MEDICINE

## 2023-01-17 NOTE — PROGRESS NOTES
"    Subjective:     Encounter Date:2023      Patient ID: Neeta Harrell is a 77 y.o. female.    Chief Complaint:  History of Present Illness 77-year-old white female with history of hypertension hyperlipidemia diabetes and arrhythmia with palpitations presents to my office for follow-up.  Patient is currently stable without any symptoms of chest pain or shortness of breath at rest on exertion.  No complains any PND orthopnea.  No palpitation dizziness syncope she has some swelling of the left ankle.  She is taking her medicines regularly.  She does not smoke.    The following portions of the patient's history were reviewed and updated as appropriate: allergies, current medications, past family history, past medical history, past social history, past surgical history and problem list.  Past Medical History:   Diagnosis Date   • Arthritis    • CKD (chronic kidney disease)    • Diabetes mellitus (HCC)    • Hyperlipidemia    • Hypertension      Past Surgical History:   Procedure Laterality Date   • ANKLE OPEN REDUCTION INTERNAL FIXATION Left 9/3/2022    Procedure: ANKLE OPEN REDUCTION INTERNAL FIXATION;  Surgeon: Tyrell Galarza MD;  Location: [facility];  Service: Orthopedics;  Laterality: Left;   •  SECTION      x2   • CHOLECYSTECTOMY     • HYSTERECTOMY     • INCONTINENCE SURGERY     • TONSILLECTOMY       /52   Pulse 54   Ht 165.1 cm (65\")   Wt 89.8 kg (198 lb)   SpO2 98%   BMI 32.95 kg/m²   Family History   Problem Relation Age of Onset   • Arrhythmia Sister         has pacemaker   • Heart disease Brother         bypass sx       Current Outpatient Medications:   •  alendronate (FOSAMAX) 70 MG tablet, Take 1 tablet by mouth Every 7 (Seven) Days. , Disp: , Rfl:   •  amLODIPine (NORVASC) 5 MG tablet, Take 1 tablet by mouth Daily., Disp: 90 tablet, Rfl: 3  •  aspirin 81 MG EC tablet, Take 1 tablet by mouth Every Other Day., Disp: , Rfl:   •  atorvastatin (LIPITOR) 40 MG tablet, " Take 1 tablet by mouth Daily., Disp: , Rfl:   •  carvedilol (Coreg) 6.25 MG tablet, Take 1 tablet by mouth 2 (Two) Times a Day With Meals., Disp: 60 tablet, Rfl: 0  •  cholecalciferol (VITAMIN D3) 25 MCG (1000 UT) tablet, Take 2,000 Units by mouth 2 (Two) Times a Day., Disp: , Rfl:   •  ferrous sulfate 324 (65 Fe) MG tablet delayed-release EC tablet, Take 324 mg by mouth Daily With Breakfast., Disp: , Rfl:   •  glucagon (GLUCAGEN) 1 MG injection, Inject 1 mg into the appropriate muscle as directed by prescriber 1 (One) Time As Needed., Disp: , Rfl:   •  Glucagon (Gvoke HypoPen 2-Pack) 1 MG/0.2ML solution auto-injector, Inject 1 mg under the skin into the appropriate area as directed As Needed (for a low blood sugar)., Disp: 0.2 mL, Rfl: 1  •  insulin detemir (Levemir FlexTouch) 100 UNIT/ML injection, Inject 26-28 Units under the skin into the appropriate area as directed Every Night., Disp: , Rfl:   •  Insulin Lispro, 1 Unit Dial, (HUMALOG) 100 UNIT/ML solution pen-injector, Inject 10 Units under the skin into the appropriate area as directed 3 (Three) Times a Day With Meals., Disp: , Rfl:   •  losartan (COZAAR) 100 MG tablet, Take 1 tablet by mouth Daily., Disp: , Rfl:   •  Omega-3 Fatty Acids (fish oil) 1000 MG capsule capsule, Take 1,000 mg by mouth 2 (Two) Times a Day With Meals., Disp: , Rfl:   •  ondansetron (Zofran) 8 MG tablet, Take 1 tablet by mouth Every 12 (Twelve) Hours As Needed for Nausea or Vomiting., Disp: 15 tablet, Rfl: 0  •  oxyCODONE-acetaminophen (PERCOCET) 5-325 MG per tablet, Take 1 tablet by mouth Every 4 (Four) Hours As Needed for Moderate Pain., Disp: 30 tablet, Rfl: 0  •  pantoprazole (PROTONIX) 40 MG EC tablet, Take 40 mg by mouth 2 (two) times a day., Disp: , Rfl:   •  SITagliptin (JANUVIA) 25 MG tablet, Take 25 mg by mouth Daily., Disp: , Rfl:   •  sodium bicarbonate 650 MG tablet, Take 650 mg by mouth 3 (Three) Times a Day., Disp: , Rfl:   •  Zinc 50 MG capsule, Take 1 tablet by mouth  Daily., Disp: , Rfl:   Allergies   Allergen Reactions   • Ibuprofen Other (See Comments) and Nausea And Vomiting     Kidney and liver problems     Social History     Socioeconomic History   • Marital status:    Tobacco Use   • Smoking status: Never   • Smokeless tobacco: Never   Vaping Use   • Vaping Use: Never used   Substance and Sexual Activity   • Alcohol use: Never   • Drug use: Never   • Sexual activity: Defer     Review of Systems   Constitutional: Negative for malaise/fatigue.   Cardiovascular: Positive for leg swelling. Negative for chest pain, dyspnea on exertion and palpitations.   Respiratory: Negative for cough and shortness of breath.    Gastrointestinal: Negative for abdominal pain, nausea and vomiting.   Neurological: Negative for dizziness, focal weakness, headaches, light-headedness and numbness.   All other systems reviewed and are negative.             Objective:     Constitutional:       Appearance: Well-developed.   Eyes:      General: No scleral icterus.     Conjunctiva/sclera: Conjunctivae normal.   HENT:      Head: Normocephalic and atraumatic.   Neck:      Vascular: No carotid bruit or JVD.   Pulmonary:      Effort: Pulmonary effort is normal.      Breath sounds: Normal breath sounds. No wheezing. No rales.   Cardiovascular:      Normal rate. Regular rhythm.   Pulses:     Intact distal pulses.   Edema:     Peripheral edema present.  Abdominal:      General: Bowel sounds are normal.      Palpations: Abdomen is soft.   Musculoskeletal:      Cervical back: Normal range of motion and neck supple. Skin:     General: Skin is warm and dry.      Findings: No rash.   Neurological:      Mental Status: Alert.       Procedures    Lab Review:         MDM  1.  Palpitations  Patient had history of palpitations with arrhythmias but is currently stable on medications including carvedilol  2.  Hypertension  Patient blood pressure currently stable on amlodipine and carvedilol and losartan  3.   Diabetes  's.  Patient is currently on insulin and followed by the primary care doctor  4 hyperlipidemia  Patient is on Lipitor and the lipid levels are well within normal limits    Patient's previous medical records, labs, and EKG were reviewed and discussed with the patient at today's visit.

## 2023-06-02 RX ORDER — AMLODIPINE BESYLATE 5 MG/1
5 TABLET ORAL DAILY
Qty: 90 TABLET | Refills: 3 | Status: SHIPPED | OUTPATIENT
Start: 2023-06-02

## 2023-06-02 NOTE — TELEPHONE ENCOUNTER
Rx Refill Note  Requested Prescriptions     Pending Prescriptions Disp Refills   • amLODIPine (NORVASC) 5 MG tablet [Pharmacy Med Name: AMLODIPINE BESYLATE 5MG TABLETS] 90 tablet 3     Sig: TAKE 1 TABLET BY MOUTH DAILY      Last office visit with prescribing clinician: 1/17/2023   Last telemedicine visit with prescribing clinician: Visit date not found   Next office visit with prescribing clinician: 7/25/2023                         Would you like a call back once the refill request has been completed: [] Yes [] No    If the office needs to give you a call back, can they leave a voicemail: [] Yes [] No    Tala Antunez MA  06/02/23, 08:06 EDT

## 2023-07-25 ENCOUNTER — OFFICE VISIT (OUTPATIENT)
Dept: CARDIOLOGY | Facility: CLINIC | Age: 78
End: 2023-07-25
Payer: MEDICARE

## 2023-07-25 VITALS
HEIGHT: 65 IN | WEIGHT: 205 LBS | HEART RATE: 55 BPM | BODY MASS INDEX: 34.16 KG/M2 | SYSTOLIC BLOOD PRESSURE: 139 MMHG | OXYGEN SATURATION: 99 % | DIASTOLIC BLOOD PRESSURE: 87 MMHG

## 2023-07-25 DIAGNOSIS — E11.9 TYPE 2 DIABETES MELLITUS WITHOUT COMPLICATION, WITHOUT LONG-TERM CURRENT USE OF INSULIN: ICD-10-CM

## 2023-07-25 DIAGNOSIS — I10 ESSENTIAL HYPERTENSION: Primary | ICD-10-CM

## 2023-07-25 DIAGNOSIS — R00.2 PALPITATIONS: ICD-10-CM

## 2023-07-25 DIAGNOSIS — E78.00 PURE HYPERCHOLESTEROLEMIA: ICD-10-CM

## 2023-07-25 PROCEDURE — 1159F MED LIST DOCD IN RCRD: CPT | Performed by: INTERNAL MEDICINE

## 2023-07-25 PROCEDURE — 99214 OFFICE O/P EST MOD 30 MIN: CPT | Performed by: INTERNAL MEDICINE

## 2023-07-25 PROCEDURE — 1160F RVW MEDS BY RX/DR IN RCRD: CPT | Performed by: INTERNAL MEDICINE

## 2023-07-25 NOTE — PROGRESS NOTES
"    Subjective:     Encounter Date:2023      Patient ID: Neeta Harrell is a 77 y.o. female.    Chief Complaint:  History of Present Illness 76-year-old white female with history of hypertension hyperlipidemia diabetes and palpitations presents to my office for a follow-up.  Patient is currently stable without any symptoms of chest pain or shortness of breath at rest or exertion.  No complains of any PND orthopnea.  No palpitation dizziness syncope.  Patient has some swelling of the feet.  Patient is taking all her medicines regularly.  Patient does not smoke.    The following portions of the patient's history were reviewed and updated as appropriate: allergies, current medications, past family history, past medical history, past social history, past surgical history, and problem list.  Past Medical History:   Diagnosis Date    Arthritis     CKD (chronic kidney disease)     Diabetes mellitus     Hyperlipidemia     Hypertension      Past Surgical History:   Procedure Laterality Date    ANKLE OPEN REDUCTION INTERNAL FIXATION Left 9/3/2022    Procedure: ANKLE OPEN REDUCTION INTERNAL FIXATION;  Surgeon: Tyrell Galarza MD;  Location: [facility];  Service: Orthopedics;  Laterality: Left;     SECTION      x2    CHOLECYSTECTOMY      HYSTERECTOMY      INCONTINENCE SURGERY      TONSILLECTOMY       /87   Pulse 55   Ht 165.1 cm (65\")   Wt 93 kg (205 lb)   SpO2 99%   BMI 34.11 kg/m²   Family History   Problem Relation Age of Onset    Arrhythmia Sister         has pacemaker    Heart disease Brother         bypass sx       Current Outpatient Medications:     alendronate (FOSAMAX) 70 MG tablet, Take 1 tablet by mouth Every 7 (Seven) Days. , Disp: , Rfl:     amLODIPine (NORVASC) 5 MG tablet, TAKE 1 TABLET BY MOUTH DAILY, Disp: 90 tablet, Rfl: 3    aspirin 81 MG EC tablet, Take 1 tablet by mouth Every Other Day., Disp: , Rfl:     atorvastatin (LIPITOR) 40 MG tablet, Take 1 tablet by mouth " Daily., Disp: , Rfl:     carvedilol (Coreg) 6.25 MG tablet, Take 1 tablet by mouth 2 (Two) Times a Day With Meals., Disp: 60 tablet, Rfl: 0    cholecalciferol (VITAMIN D3) 25 MCG (1000 UT) tablet, Take 2 tablets by mouth 2 (Two) Times a Day., Disp: , Rfl:     ferrous sulfate 324 (65 Fe) MG tablet delayed-release EC tablet, Take 1 tablet by mouth Daily With Breakfast., Disp: , Rfl:     glucagon (GLUCAGEN) 1 MG injection, Inject 1 mg into the appropriate muscle as directed by prescriber 1 (One) Time As Needed., Disp: , Rfl:     Glucagon (Gvoke HypoPen 2-Pack) 1 MG/0.2ML solution auto-injector, Inject 1 mg under the skin into the appropriate area as directed As Needed (for a low blood sugar)., Disp: 0.2 mL, Rfl: 1    insulin detemir (Levemir FlexTouch) 100 UNIT/ML injection, Inject 26-28 Units under the skin into the appropriate area as directed Every Night., Disp: , Rfl:     Insulin Lispro, 1 Unit Dial, (HUMALOG) 100 UNIT/ML solution pen-injector, Inject 10 Units under the skin into the appropriate area as directed 3 (Three) Times a Day With Meals., Disp: , Rfl:     losartan (COZAAR) 100 MG tablet, Take 1 tablet by mouth Daily., Disp: , Rfl:     Omega-3 Fatty Acids (fish oil) 1000 MG capsule capsule, Take 1 capsule by mouth 2 (Two) Times a Day With Meals., Disp: , Rfl:     ondansetron (Zofran) 8 MG tablet, Take 1 tablet by mouth Every 12 (Twelve) Hours As Needed for Nausea or Vomiting., Disp: 15 tablet, Rfl: 0    oxyCODONE-acetaminophen (PERCOCET) 5-325 MG per tablet, Take 1 tablet by mouth Every 4 (Four) Hours As Needed for Moderate Pain., Disp: 30 tablet, Rfl: 0    pantoprazole (PROTONIX) 40 MG EC tablet, Take 1 tablet by mouth 2 (two) times a day., Disp: , Rfl:     SITagliptin (JANUVIA) 25 MG tablet, Take 1 tablet by mouth Daily., Disp: , Rfl:     sodium bicarbonate 650 MG tablet, Take 1 tablet by mouth 3 (Three) Times a Day., Disp: , Rfl:     Zinc 50 MG capsule, Take 1 tablet by mouth Daily., Disp: , Rfl:    Allergies   Allergen Reactions    Ibuprofen Other (See Comments) and Nausea And Vomiting     Kidney and liver problems     Social History     Socioeconomic History    Marital status:    Tobacco Use    Smoking status: Never    Smokeless tobacco: Never   Vaping Use    Vaping Use: Never used   Substance and Sexual Activity    Alcohol use: Never    Drug use: Never    Sexual activity: Defer     Review of Systems   Constitutional: Negative for malaise/fatigue.   Cardiovascular:  Positive for leg swelling. Negative for chest pain, dyspnea on exertion and palpitations.   Respiratory:  Negative for cough and shortness of breath.    Gastrointestinal:  Negative for abdominal pain, nausea and vomiting.   Neurological:  Negative for dizziness, focal weakness, headaches, light-headedness and numbness.   All other systems reviewed and are negative.           Objective:     Constitutional:       Appearance: Well-developed.   Eyes:      General: No scleral icterus.     Conjunctiva/sclera: Conjunctivae normal.   HENT:      Head: Normocephalic and atraumatic.   Neck:      Vascular: No carotid bruit or JVD.   Pulmonary:      Effort: Pulmonary effort is normal.      Breath sounds: Normal breath sounds. No wheezing. No rales.   Cardiovascular:      Normal rate. Regular rhythm.   Pulses:     Intact distal pulses.   Abdominal:      General: Bowel sounds are normal.      Palpations: Abdomen is soft.   Musculoskeletal:      Cervical back: Normal range of motion and neck supple. Skin:     General: Skin is warm and dry.      Findings: No rash.   Neurological:      Mental Status: Alert.     Procedures    Lab Review:         MDM    #1 palpitation  Patient has history of PACs and atrial arrhythmias but is currently stable on medications including carvedilol  2.  Hypertension  Patient blood pressure currently stable on amlodipine carvedilol and losartan  3.  Hyperlipidemia  Patient on atorvastatin the lipid levels are well within normal  limits  4.  Diabetes  Patient is on insulin and followed by the primary care doctor.    Patient's previous medical records, labs, and EKG were reviewed and discussed with the patient at today's visit.

## 2023-07-27 ENCOUNTER — OFFICE VISIT (OUTPATIENT)
Dept: ENDOCRINOLOGY | Facility: CLINIC | Age: 78
End: 2023-07-27
Payer: MEDICARE

## 2023-07-27 ENCOUNTER — PATIENT ROUNDING (BHMG ONLY) (OUTPATIENT)
Dept: ENDOCRINOLOGY | Facility: CLINIC | Age: 78
End: 2023-07-27
Payer: MEDICARE

## 2023-07-27 ENCOUNTER — TELEPHONE (OUTPATIENT)
Dept: ENDOCRINOLOGY | Facility: CLINIC | Age: 78
End: 2023-07-27

## 2023-07-27 VITALS
HEIGHT: 65 IN | BODY MASS INDEX: 33.82 KG/M2 | OXYGEN SATURATION: 98 % | SYSTOLIC BLOOD PRESSURE: 165 MMHG | WEIGHT: 203 LBS | DIASTOLIC BLOOD PRESSURE: 80 MMHG | HEART RATE: 57 BPM

## 2023-07-27 DIAGNOSIS — N18.4 STAGE 4 CHRONIC KIDNEY DISEASE: Chronic | ICD-10-CM

## 2023-07-27 DIAGNOSIS — Z79.4 TYPE 2 DIABETES MELLITUS WITH HYPERGLYCEMIA, WITH LONG-TERM CURRENT USE OF INSULIN: Primary | ICD-10-CM

## 2023-07-27 DIAGNOSIS — E78.2 MIXED HYPERLIPIDEMIA: ICD-10-CM

## 2023-07-27 DIAGNOSIS — E11.65 TYPE 2 DIABETES MELLITUS WITH HYPERGLYCEMIA, WITH LONG-TERM CURRENT USE OF INSULIN: Primary | ICD-10-CM

## 2023-07-27 DIAGNOSIS — I10 BENIGN ESSENTIAL HYPERTENSION: ICD-10-CM

## 2023-07-27 PROBLEM — E11.29 PERSISTENT PROTEINURIA DUE TO TYPE 2 DIABETES MELLITUS: Status: ACTIVE | Noted: 2021-12-15

## 2023-07-27 PROBLEM — E11.69 HYPERLIPIDEMIA DUE TO TYPE 2 DIABETES MELLITUS: Status: ACTIVE | Noted: 2021-08-20

## 2023-07-27 PROBLEM — I12.9 BENIGN HYPERTENSIVE KIDNEY DISEASE WITH CHRONIC KIDNEY DISEASE: Status: ACTIVE | Noted: 2019-12-13

## 2023-07-27 PROBLEM — M25.473 ANKLE EDEMA: Status: ACTIVE | Noted: 2021-12-15

## 2023-07-27 PROBLEM — R80.9 PERSISTENT PROTEINURIA DUE TO TYPE 2 DIABETES MELLITUS: Status: ACTIVE | Noted: 2021-12-15

## 2023-07-27 PROBLEM — K22.70 BARRETT'S ESOPHAGUS: Status: ACTIVE | Noted: 2021-08-20

## 2023-07-27 PROBLEM — E78.5 HYPERLIPIDEMIA DUE TO TYPE 2 DIABETES MELLITUS: Status: ACTIVE | Noted: 2021-08-20

## 2023-07-27 PROBLEM — E55.9 VITAMIN D DEFICIENCY: Status: ACTIVE | Noted: 2023-02-23

## 2023-07-27 PROBLEM — E66.3 OVERWEIGHT: Status: ACTIVE | Noted: 2021-08-20

## 2023-07-27 PROBLEM — K22.2 STRICTURE OF ESOPHAGUS: Status: ACTIVE | Noted: 2021-08-20

## 2023-07-27 RX ORDER — INSULIN LISPRO 100 [IU]/ML
INJECTION, SOLUTION INTRAVENOUS; SUBCUTANEOUS
Qty: 15 ML | Refills: 6 | Status: SHIPPED | OUTPATIENT
Start: 2023-07-27

## 2023-07-27 RX ORDER — INSULIN DETEMIR 100 [IU]/ML
18 INJECTION, SOLUTION SUBCUTANEOUS NIGHTLY
Qty: 15 ML | Refills: 6 | Status: SHIPPED | OUTPATIENT
Start: 2023-07-27

## 2023-07-27 NOTE — PROGRESS NOTES
Endocrine Consult Outpatient  Referred by Dr. Smith for diabetes consultation  Patient Care Team:  Landon Smith MD as PCP - General  Landon Smith MD as PCP - Family Medicine  Cesar Ellis MD as Consulting Physician (Cardiology)     Chief Complaint: Uncontrolled type 2 diabetes         HPI: This is a 77-year-old female with history of type 2 diabetes, CKD stage IV disease, hypertension, hyperlipidemia is referred for diabetes evaluation management.    For type 2 diabetes: Initial diagnosis was in June 2003.  She tells me that she did do diabetes education in 2003 and she is using freestyle jareth 2.  She is checking her blood sugars 6-8 times per day.  She is currently using Januvia 25 mg once a day, Levemir 18 units in the evening, Humalog 10 units with each meal and was recently started on Jardiance 10 mg once a day by her nephrologist.  She tells me her main issue is having low blood sugars.  She is taking her Humalog post meals most of the time.    Hypertension: Blood pressure is high today.    Hyperlipidemia: Currently on  atorvastatin and Lovaza    CKD stage IV disease: Follows with Dr. Ford.    Past Medical History:   Diagnosis Date    Arthritis     Cataract     CKD (chronic kidney disease)     Diabetes mellitus     Hyperlipidemia     Hypertension        Social History     Socioeconomic History    Marital status:      Spouse name: Tyrell    Number of children: 1    Years of education: 12   Tobacco Use    Smoking status: Never    Smokeless tobacco: Never   Vaping Use    Vaping Use: Never used   Substance and Sexual Activity    Alcohol use: Never    Drug use: Never    Sexual activity: Defer       Family History   Problem Relation Age of Onset    Kidney disease Mother     Thyroid disease Mother     Hypertension Father     Diabetes Father     Thyroid disease Sister     Hyperlipidemia Sister     Hypertension Sister     Heart disease Sister     Diabetes Sister     Arrhythmia Sister         has  pacemaker    Kidney disease Brother     Hypertension Brother     Diabetes Brother     Heart disease Brother         bypass sx    Hyperlipidemia Brother        Allergies   Allergen Reactions    Ibuprofen Other (See Comments) and Nausea And Vomiting     Kidney and liver problems       ROS:   Constitutional:  Denies fatigue, tiredness.    Eyes:  Denies change in visual acuity   HENT:  Denies nasal congestion or sore throat   Respiratory: denies cough, shortness of breath.   Cardiovascular:  denies chest pain, edema   GI:  Denies abdominal pain, nausea, vomiting.    :  Denies dysuria   Musculoskeletal:  Denies back pain or joint pain   Integument:  Denies dry skin, rash   Neurologic:  Denies headache, focal weakness or sensory changes   Endocrine:  Denies polyuria or polydipsia   Psychiatric:  Denies depression or anxiety      Vitals:    07/27/23 0848   BP: 165/80   Pulse: 57   SpO2: 98%     Body mass index is 33.78 kg/m².      Physical Exam:  GEN: NAD, conversant  EYES: EOMI, PERRL, no conjunctival erythema  NECK: no thyromegaly, full ROM   CV: RRR, no murmurs/rubs/gallops, no peripheral edema  LUNG: CTAB, no wheezes/rales/ronchi  SKIN: no rashes, no acanthosis  MSK: no deformities, full ROM of all extremities  NEURO: no tremors, DTR normal  PSYCH: AOX3, appropriate mood, affect normal      Results Review:     I reviewed the patient's new clinical results.    Lab Results   Component Value Date    GLUCOSE 345 (H) 09/04/2022    BUN 33 (H) 09/04/2022    CREATININE 1.81 (H) 09/04/2022    EGFRIFNONA 27 (L) 05/28/2021    BCR 18.2 09/04/2022    K 5.3 (H) 09/04/2022    CO2 18.0 (L) 09/04/2022    CALCIUM 7.7 (L) 09/04/2022    ALBUMIN 4.20 09/01/2022    AST 17 09/01/2022    ALT 15 09/01/2022     No results found for: HGBA1C  Lab Results   Component Value Date    CREATININE 1.81 (H) 09/04/2022     Labs from July 7, 2023 showed a white count of 5.5, hemoglobin 11.7 with hematocrit 35.1 and platelet count was 280  Sodium 141,  potassium 5.3, chloride 105, CO2 21, glucose 179, BUN 44 with creatinine 2.37 with GFR of 21, AST 17 and ALT 17  A1c was 7.3%  Total cholesterol 164, triglycerides 131, HDL 64 and LDL 77.    Medication Review: Reviewed.       Current Outpatient Medications:     alendronate (FOSAMAX) 70 MG tablet, Take 1 tablet by mouth Every 7 (Seven) Days. fridays, Disp: , Rfl:     amLODIPine (NORVASC) 5 MG tablet, TAKE 1 TABLET BY MOUTH DAILY, Disp: 90 tablet, Rfl: 3    aspirin 81 MG EC tablet, Take 1 tablet by mouth Every Other Day., Disp: , Rfl:     atorvastatin (LIPITOR) 40 MG tablet, Take 1 tablet by mouth Daily., Disp: , Rfl:     carvedilol (Coreg) 6.25 MG tablet, Take 1 tablet by mouth 2 (Two) Times a Day With Meals., Disp: 60 tablet, Rfl: 0    cholecalciferol (VITAMIN D3) 25 MCG (1000 UT) tablet, Take 2 tablets by mouth 2 (Two) Times a Day., Disp: , Rfl:     empagliflozin (Jardiance) 10 MG tablet tablet, Take  by mouth Daily., Disp: , Rfl:     ferrous sulfate 324 (65 Fe) MG tablet delayed-release EC tablet, Take 1 tablet by mouth Daily With Breakfast., Disp: , Rfl:     glucagon (GLUCAGEN) 1 MG injection, Inject 1 mg into the appropriate muscle as directed by prescriber 1 (One) Time As Needed., Disp: , Rfl:     Glucagon (Gvoke HypoPen 2-Pack) 1 MG/0.2ML solution auto-injector, Inject 1 mg under the skin into the appropriate area as directed As Needed (for a low blood sugar)., Disp: 0.2 mL, Rfl: 1    insulin detemir (Levemir FlexTouch) 100 UNIT/ML injection, Inject 26-28 Units under the skin into the appropriate area as directed Every Night., Disp: , Rfl:     Insulin Lispro, 1 Unit Dial, (HUMALOG) 100 UNIT/ML solution pen-injector, Inject 10 Units under the skin into the appropriate area as directed 3 (Three) Times a Day With Meals., Disp: , Rfl:     losartan (COZAAR) 100 MG tablet, Take 1 tablet by mouth Daily., Disp: , Rfl:     Omega-3 Fatty Acids (fish oil) 1000 MG capsule capsule, Take 1 capsule by mouth 2 (Two) Times a  Day With Meals., Disp: , Rfl:     pantoprazole (PROTONIX) 40 MG EC tablet, Take 1 tablet by mouth 2 (two) times a day., Disp: , Rfl:     SITagliptin (JANUVIA) 25 MG tablet, Take 1 tablet by mouth Daily., Disp: , Rfl:     sodium bicarbonate 650 MG tablet, Take 1 tablet by mouth 3 (Three) Times a Day., Disp: , Rfl:     Zinc 50 MG capsule, Take 1 tablet by mouth Daily., Disp: , Rfl:     ondansetron (Zofran) 8 MG tablet, Take 1 tablet by mouth Every 12 (Twelve) Hours As Needed for Nausea or Vomiting. (Patient not taking: Reported on 7/27/2023), Disp: 15 tablet, Rfl: 0    oxyCODONE-acetaminophen (PERCOCET) 5-325 MG per tablet, Take 1 tablet by mouth Every 4 (Four) Hours As Needed for Moderate Pain. (Patient not taking: Reported on 7/27/2023), Disp: 30 tablet, Rfl: 0        Assessment and plan:  Diabetes mellitus type 2 with hyperglycemia: Uncontrolled, she is having low blood sugars.  She is taking her Humalog post meals, at this time I advised her to decrease Humalog to 6 units as a base dose and take it before she eats her meals and continue Levemir 18 units every evening.  She will continue Januvia and Jardiance for now.  We did talk about GLP-1 therapy including Ozempic, she is going to discuss that with her nephrologist if he is okay then we might consider adding Ozempic and taking off Januvia down the road.  She will also do a correction scale of 1 unit for each 50 mg blood sugar over 150 at meals.  I will also refer her for refresher course for diabetes.    Hypertension: Blood pressure is high today, will recheck at next visit    Hyperlipidemia: Currently on atorvastatin, follow lipid panel.    CKD stage IV disease: Follows with Dr. Ford.  She is on Fosamax, I advised her to talk with Dr. Ford to see if it is okay to continue at this stage and kidney disease.    Thank you very much for the consultation.    Dieudonne Sellers MD FACE.

## 2023-07-27 NOTE — PROGRESS NOTES
July 27, 2023    Hello, may I speak with Neeta Harrell?    My name is Charlene      I am  with [facility]  [address] [place] [address].    Before we get started may I verify your date of birth? 1945    I am calling to officially welcome you to our practice and ask about your recent visit. Is this a good time to talk? yes    Tell me about your visit with us. What things went well?  it all went well. Very thorough and everyone here is so nice       We're always looking for ways to make our patients' experiences even better. Do you have recommendations on ways we may improve?  no    Overall were you satisfied with your first visit to our practice? yes       I appreciate you taking the time to speak with me today. Is there anything else I can do for you? no      Thank you, and have a great day.

## 2023-07-27 NOTE — PATIENT INSTRUCTIONS
Continue Levemir 18 units subcu nightly  Change Humalog to 6 units with each meal as a base dose which you need to take right before you eat and add Humalog sliding scale 1 unit for each 50 mg blood sugar over 150 at meals.  If the blood sugar is less than 70 then skip the base dose.  Go ahead  Labs before f/u  Refresher course for diabetes education  Labs before follow-up  Discussed with Dr. Ford regarding Ozempic and also alendronate.

## 2023-07-27 NOTE — TELEPHONE ENCOUNTER
Rx ready to send. Per [facility] policy, when refills come in if there are any red check marks or if it was a paper/verbal request it has to go to the provider to approve. Forwarding Script to provider.    I advised patient if she is unable to get [facility] to fill it to let us know and we can send to a DME company since she has medicare.

## 2023-07-28 ENCOUNTER — TELEPHONE (OUTPATIENT)
Dept: ENDOCRINOLOGY | Facility: CLINIC | Age: 78
End: 2023-07-28
Payer: MEDICARE

## 2023-07-28 NOTE — TELEPHONE ENCOUNTER
GERARDOO for [facility]. In Dr Sellers's inbox for signature.  Will fax once signed.

## 2023-08-07 ENCOUNTER — OFFICE VISIT (OUTPATIENT)
Dept: ENDOCRINOLOGY | Facility: CLINIC | Age: 78
End: 2023-08-07
Payer: MEDICARE

## 2023-08-07 DIAGNOSIS — E11.65 TYPE 2 DIABETES MELLITUS WITH HYPERGLYCEMIA, WITH LONG-TERM CURRENT USE OF INSULIN: Primary | ICD-10-CM

## 2023-08-07 DIAGNOSIS — Z79.4 TYPE 2 DIABETES MELLITUS WITH HYPERGLYCEMIA, WITH LONG-TERM CURRENT USE OF INSULIN: Primary | ICD-10-CM

## 2023-08-07 PROCEDURE — G0108 DIAB MANAGE TRN  PER INDIV: HCPCS

## 2023-08-07 NOTE — PROGRESS NOTES
Pt here today for 1 hr and 10 min from 3:00-4:10 PM for DSMES. Pt reports improvement in BG since seeing Dr Sellers 2 wks ago in-clinic, reviewed hypoglycemia tx. States she hasn't had a low BG since appt. A1C 7.3 (7/7/23) Pt uses jareth CGM, reviewed BG from 8/5-8/7/23. Noted high BG between 9p-12am--pt having carb snack around 8:30pm--counseled pt on adding protein source. Pt has been walking/strength training at gym 3 days per wk. Pt very receptive to info provided. Scheduled for class 1 on 9/11/23 @ 8:00 am.

## 2023-09-11 ENCOUNTER — TELEPHONE (OUTPATIENT)
Dept: ENDOCRINOLOGY | Facility: CLINIC | Age: 78
End: 2023-09-11

## 2023-09-11 ENCOUNTER — OFFICE VISIT (OUTPATIENT)
Dept: ENDOCRINOLOGY | Facility: CLINIC | Age: 78
End: 2023-09-11
Payer: MEDICARE

## 2023-09-11 DIAGNOSIS — E11.65 TYPE 2 DIABETES MELLITUS WITH HYPERGLYCEMIA, WITH LONG-TERM CURRENT USE OF INSULIN: Primary | ICD-10-CM

## 2023-09-11 DIAGNOSIS — Z79.4 TYPE 2 DIABETES MELLITUS WITH HYPERGLYCEMIA, WITH LONG-TERM CURRENT USE OF INSULIN: Primary | ICD-10-CM

## 2023-09-11 PROCEDURE — G0109 DIAB MANAGE TRN IND/GROUP: HCPCS

## 2023-09-11 RX ORDER — INSULIN LISPRO 100 [IU]/ML
INJECTION, SOLUTION INTRAVENOUS; SUBCUTANEOUS
Qty: 15 ML | Refills: 6
Start: 2023-09-11

## 2023-09-11 RX ORDER — SEMAGLUTIDE 1.34 MG/ML
INJECTION, SOLUTION SUBCUTANEOUS
Qty: 2 ML | Refills: 6 | Status: SHIPPED | OUTPATIENT
Start: 2023-09-11

## 2023-09-11 NOTE — PROGRESS NOTES
Pt here today for three hours and thirty minutes from 8:00 AM to 11:30 AM for class 1. Pt unable to link [employer] account to clinic because she forgot password. Pt will try at home and call customer care line if still having trouble. Recorded past week BG from [employer]. Pt consistently running high before bed (222, 273, 261, 252, 222). Please see BG log scanned into media. Per pt has had issues with low BG in the past but not since being seen by Dr Sellers in July 2023. Pt taking 6 units of Humalog with meals, SSI: pt to add 1 unit for each 50 mg blood sugar over 150 for meals only, and 18 units of Levemir at HS. Per MD s/o increase Humalog from 6 to 8 units at dinner. Also reminded pt to take 15 min before meal and reviewed rule of 15s for treating low BG. Pt interested in starting Ozempic, advised pt to discuss with Dr Sellers. Pt to call to schedule class 2 in Oct.

## 2023-09-11 NOTE — TELEPHONE ENCOUNTER
Pt attended class 1 today. States that she approved Ozempic with her renal doc and wants to know if you will prescribe for her. Advised her to discuss with you during f/u on 10/24/23.

## 2023-09-12 ENCOUNTER — TELEPHONE (OUTPATIENT)
Dept: ENDOCRINOLOGY | Facility: CLINIC | Age: 78
End: 2023-09-12
Payer: MEDICARE

## 2023-09-12 NOTE — TELEPHONE ENCOUNTER
Called pt to let her know that Dr Sellers sent rx for Ozempic. Pt to DC Januvia and start Ozempic 0.25 mg subcu weekly for 4 weeks and if able to tolerate then increase the dose to 0.5 mg subcu weekly. Reminded pt to watch for low blood sugars and always keep glucose source in case of low blood sugars.

## 2023-09-14 ENCOUNTER — PRIOR AUTHORIZATION (OUTPATIENT)
Dept: ENDOCRINOLOGY | Facility: CLINIC | Age: 78
End: 2023-09-14
Payer: MEDICARE

## 2023-09-14 NOTE — TELEPHONE ENCOUNTER
9/14/2023      PA has been initiated in Covermymeds as of today for the Ozempic 2 MG/ 3 ML Pen Injectors all information has been submitted and forward over to the patient insurance company for further review. It is currently still pending a response back from the patient insurance company with an determination office notes, labs, etc has been faxed over to the company for review as well no further action required and or needed at the present time.      gerardo (Key: [id number])  Rx #: [id number]

## 2023-10-17 ENCOUNTER — LAB (OUTPATIENT)
Dept: LAB | Facility: HOSPITAL | Age: 78
End: 2023-10-17
Payer: MEDICARE

## 2023-10-17 DIAGNOSIS — Z79.4 TYPE 2 DIABETES MELLITUS WITH HYPERGLYCEMIA, WITH LONG-TERM CURRENT USE OF INSULIN: ICD-10-CM

## 2023-10-17 DIAGNOSIS — E11.65 TYPE 2 DIABETES MELLITUS WITH HYPERGLYCEMIA, WITH LONG-TERM CURRENT USE OF INSULIN: ICD-10-CM

## 2023-10-17 LAB
ALBUMIN SERPL-MCNC: 3.7 G/DL (ref 3.5–5.2)
ALBUMIN UR-MCNC: 26.9 MG/DL
ALBUMIN/GLOB SERPL: 1 G/DL
ALP SERPL-CCNC: 44 U/L (ref 39–117)
ALT SERPL W P-5'-P-CCNC: 9 U/L (ref 1–33)
ANION GAP SERPL CALCULATED.3IONS-SCNC: 14.9 MMOL/L (ref 5–15)
AST SERPL-CCNC: 17 U/L (ref 1–32)
BILIRUB SERPL-MCNC: 0.6 MG/DL (ref 0–1.2)
BUN SERPL-MCNC: 35 MG/DL (ref 8–23)
BUN/CREAT SERPL: 14.3 (ref 7–25)
CALCIUM SPEC-SCNC: 9.1 MG/DL (ref 8.6–10.5)
CHLORIDE SERPL-SCNC: 97 MMOL/L (ref 98–107)
CHOLEST SERPL-MCNC: 142 MG/DL (ref 0–200)
CO2 SERPL-SCNC: 25.1 MMOL/L (ref 22–29)
CREAT SERPL-MCNC: 2.45 MG/DL (ref 0.57–1)
CREAT UR-MCNC: 110.4 MG/DL
EGFRCR SERPLBLD CKD-EPI 2021: 20.1 ML/MIN/1.73
GLOBULIN UR ELPH-MCNC: 3.8 GM/DL
GLUCOSE SERPL-MCNC: 150 MG/DL (ref 65–99)
HBA1C MFR BLD: 7.8 % (ref 4.8–5.6)
HDLC SERPL-MCNC: 62 MG/DL (ref 40–60)
LDLC SERPL CALC-MCNC: 61 MG/DL (ref 0–100)
LDLC/HDLC SERPL: 0.95 {RATIO}
MICROALBUMIN/CREAT UR: 243.7 MG/G (ref 0–29)
POTASSIUM SERPL-SCNC: 4.3 MMOL/L (ref 3.5–5.2)
PROT SERPL-MCNC: 7.5 G/DL (ref 6–8.5)
SODIUM SERPL-SCNC: 137 MMOL/L (ref 136–145)
TRIGL SERPL-MCNC: 106 MG/DL (ref 0–150)
VLDLC SERPL-MCNC: 19 MG/DL (ref 5–40)

## 2023-10-17 PROCEDURE — 80061 LIPID PANEL: CPT

## 2023-10-17 PROCEDURE — 80053 COMPREHEN METABOLIC PANEL: CPT

## 2023-10-17 PROCEDURE — 82043 UR ALBUMIN QUANTITATIVE: CPT

## 2023-10-17 PROCEDURE — 82570 ASSAY OF URINE CREATININE: CPT

## 2023-10-17 PROCEDURE — 83036 HEMOGLOBIN GLYCOSYLATED A1C: CPT

## 2023-10-17 PROCEDURE — 36415 COLL VENOUS BLD VENIPUNCTURE: CPT

## 2023-10-24 ENCOUNTER — OFFICE VISIT (OUTPATIENT)
Dept: ENDOCRINOLOGY | Facility: CLINIC | Age: 78
End: 2023-10-24
Payer: MEDICARE

## 2023-10-24 ENCOUNTER — SPECIALTY PHARMACY (OUTPATIENT)
Dept: ENDOCRINOLOGY | Facility: CLINIC | Age: 78
End: 2023-10-24
Payer: MEDICARE

## 2023-10-24 VITALS
OXYGEN SATURATION: 98 % | WEIGHT: 191 LBS | HEART RATE: 75 BPM | BODY MASS INDEX: 31.82 KG/M2 | SYSTOLIC BLOOD PRESSURE: 135 MMHG | DIASTOLIC BLOOD PRESSURE: 68 MMHG | HEIGHT: 65 IN

## 2023-10-24 DIAGNOSIS — I10 BENIGN ESSENTIAL HYPERTENSION: ICD-10-CM

## 2023-10-24 DIAGNOSIS — E11.65 TYPE 2 DIABETES MELLITUS WITH HYPERGLYCEMIA, WITH LONG-TERM CURRENT USE OF INSULIN: Primary | ICD-10-CM

## 2023-10-24 DIAGNOSIS — Z79.4 TYPE 2 DIABETES MELLITUS WITH HYPERGLYCEMIA, WITH LONG-TERM CURRENT USE OF INSULIN: Primary | ICD-10-CM

## 2023-10-24 DIAGNOSIS — N18.4 STAGE 4 CHRONIC KIDNEY DISEASE: Chronic | ICD-10-CM

## 2023-10-24 DIAGNOSIS — E78.2 MIXED HYPERLIPIDEMIA: ICD-10-CM

## 2023-10-24 PROCEDURE — 1160F RVW MEDS BY RX/DR IN RCRD: CPT | Performed by: INTERNAL MEDICINE

## 2023-10-24 PROCEDURE — 3051F HG A1C>EQUAL 7.0%<8.0%: CPT | Performed by: INTERNAL MEDICINE

## 2023-10-24 PROCEDURE — 99214 OFFICE O/P EST MOD 30 MIN: CPT | Performed by: INTERNAL MEDICINE

## 2023-10-24 PROCEDURE — 3078F DIAST BP <80 MM HG: CPT | Performed by: INTERNAL MEDICINE

## 2023-10-24 PROCEDURE — 1159F MED LIST DOCD IN RCRD: CPT | Performed by: INTERNAL MEDICINE

## 2023-10-24 PROCEDURE — 3075F SYST BP GE 130 - 139MM HG: CPT | Performed by: INTERNAL MEDICINE

## 2023-10-24 RX ORDER — CHLORHEXIDINE GLUCONATE ORAL RINSE 1.2 MG/ML
SOLUTION DENTAL
COMMUNITY

## 2023-10-24 RX ORDER — INSULIN GLARGINE 100 [IU]/ML
INJECTION, SOLUTION SUBCUTANEOUS
COMMUNITY
Start: 2023-10-17

## 2023-10-24 RX ORDER — HYDROCODONE BITARTRATE AND ACETAMINOPHEN 5; 325 MG/1; MG/1
1 TABLET ORAL 3 TIMES DAILY
COMMUNITY

## 2023-10-24 RX ORDER — OXYCODONE AND ACETAMINOPHEN 7.5; 325 MG/1; MG/1
TABLET ORAL
COMMUNITY
End: 2023-10-24

## 2023-10-24 RX ORDER — AMOXICILLIN AND CLAVULANATE POTASSIUM 875; 125 MG/1; MG/1
1 TABLET, FILM COATED ORAL EVERY 12 HOURS SCHEDULED
COMMUNITY
Start: 2023-10-17 | End: 2023-10-24

## 2023-10-24 RX ORDER — CEPHALEXIN 500 MG/1
1 CAPSULE ORAL 3 TIMES DAILY
COMMUNITY
End: 2023-10-24

## 2023-10-24 RX ORDER — SITAGLIPTIN 100 MG/1
1 TABLET, FILM COATED ORAL DAILY
COMMUNITY
End: 2023-10-24

## 2023-10-24 RX ORDER — GLUCAGON INJECTION, SOLUTION 1 MG/.2ML
INJECTION, SOLUTION SUBCUTANEOUS
COMMUNITY

## 2023-10-24 RX ORDER — OMEGA-3-ACID ETHYL ESTERS 1 G/1
1 CAPSULE, LIQUID FILLED ORAL EVERY 12 HOURS SCHEDULED
COMMUNITY
Start: 2023-09-08

## 2023-10-24 RX ORDER — INSULIN ASPART 100 [IU]/ML
INJECTION, SOLUTION INTRAVENOUS; SUBCUTANEOUS
COMMUNITY

## 2023-10-24 RX ORDER — HYDROCODONE BITARTRATE AND HOMATROPINE METHYLBROMIDE 5; 1.5 MG/5ML; MG/5ML
SOLUTION ORAL
COMMUNITY
Start: 2023-10-17

## 2023-10-24 RX ORDER — TRAMADOL HYDROCHLORIDE 50 MG/1
TABLET ORAL
COMMUNITY

## 2023-10-24 RX ORDER — DULAGLUTIDE 0.75 MG/.5ML
0.75 INJECTION, SOLUTION SUBCUTANEOUS WEEKLY
Qty: 2 ML | Refills: 6 | Status: SHIPPED | OUTPATIENT
Start: 2023-10-24

## 2023-10-24 RX ORDER — PEN NEEDLE, DIABETIC 32GX 5/32"
NEEDLE, DISPOSABLE MISCELLANEOUS 4 TIMES DAILY
COMMUNITY
Start: 2023-09-28

## 2023-10-24 NOTE — PATIENT INSTRUCTIONS
DC Januvia  Start Trulicity 0.75 mg subcu weekly  Continue Lantus and Humalog along with Jardiance  Continue to work on your diet and activity  Always keep glucose source in case of low blood sugars  Labs before follow-up.

## 2023-10-24 NOTE — PROGRESS NOTES
Specialty Pharmacy Patient Management Program  Endocrinology Introduction to Services Outreach     Neeta Harrell is a 77 y.o. female seen by an Endocrinology provider for Type 2 Diabetes . This was an initial visit to introduce Endocrinology Patient Management Program and Specialty Pharmacy services offered by [facility], as the patient is taking specialty medication Trulicity.     Neeta Harrell is undecided about filling specialty medication at [facility] and/or enrollment in the Endocrine Disorders Patient Management Program at this time and enrollment is therefore UNDER REVIEW. Patient remains eligible for services in the future if desired. Plan to follow-up at a future visit.    Results of Benefits Investigation  Trulicity-$49.98 patient is in catastrophic coverage. Discussed PAP option with patient and it sounds like she will meet the income requirements. Patient is going to  from the pharmacy to see how she tolerates the medication and will let me know if she would like to proceed with the PAP application. The cost should remain the same through the end of the year.    Relevant Past Medical History and Comorbidities  Past Medical History:   Diagnosis Date    Arthritis     Cataract     CKD (chronic kidney disease)     Diabetes mellitus     Hyperlipidemia     Hypertension      Social History     Socioeconomic History    Marital status:      Spouse name: Tyrell    Number of children: 1    Years of education: 12   Tobacco Use    Smoking status: Never    Smokeless tobacco: Never   Vaping Use    Vaping Use: Never used   Substance and Sexual Activity    Alcohol use: Never    Drug use: Never    Sexual activity: Defer          Allergies  Ibuprofen       Current Medication List    Current Outpatient Medications:     amLODIPine (NORVASC) 5 MG tablet, TAKE 1 TABLET BY MOUTH DAILY, Disp: 90 tablet, Rfl: 3    aspirin 81 MG EC tablet, Take 1 tablet by mouth  Every Other Day., Disp: , Rfl:     atorvastatin (LIPITOR) 40 MG tablet, Take 1 tablet by mouth Daily., Disp: , Rfl:     BD Pen Needle Melissa 2nd Gen 32G X 4 MM misc, 4 (Four) Times a Day., Disp: , Rfl:     carvedilol (Coreg) 6.25 MG tablet, Take 1 tablet by mouth 2 (Two) Times a Day With Meals., Disp: 60 tablet, Rfl: 0    chlorhexidine (PERIDEX) 0.12 % solution, , Disp: , Rfl:     cholecalciferol (VITAMIN D3) 25 MCG (1000 UT) tablet, Take 2 tablets by mouth 2 (Two) Times a Day., Disp: , Rfl:     Continuous Blood Gluc  (FreeStyle Dejah 2 [place]) device, 1 each Continuous., Disp: 1 each, Rfl: 0    Continuous Blood Gluc Sensor (FreeStyle Dejah 2 Sensor) misc, 1 each Every 14 (Fourteen) Days., Disp: 6 each, Rfl: 4    Dulaglutide (Trulicity) 0.75 MG/0.5ML solution pen-injector, Inject 0.75 mg under the skin into the appropriate area as directed 1 (One) Time Per Week., Disp: 2 mL, Rfl: 6    empagliflozin (Jardiance) 10 MG tablet tablet, Take  by mouth Daily., Disp: , Rfl:     ferrous sulfate 324 (65 Fe) MG tablet delayed-release EC tablet, Take 1 tablet by mouth Daily With Breakfast., Disp: , Rfl:     glucagon (GLUCAGEN) 1 MG injection, Inject 1 mg into the appropriate muscle as directed by prescriber 1 (One) Time As Needed., Disp: , Rfl:     Glucagon (Gvoke HypoPen 2-Pack) 1 MG/0.2ML solution auto-injector, Inject 1 mg under the skin into the appropriate area as directed As Needed (for a low blood sugar)., Disp: 0.2 mL, Rfl: 1    Glucagon (Gvoke PFS) 1 MG/0.2ML solution prefilled syringe, INJECT 1 MG UNDER THE SKIN INTO THE APPROPRIATE AREA AS DIRECTED AS NEEDED FOR LOW BLOOD SUGAR, Disp: , Rfl:     HYDROcodone-acetaminophen (NORCO) 5-325 MG per tablet, Take 1 tablet by mouth 3 (Three) Times a Day., Disp: , Rfl:     Hydromet 5-1.5 MG/5ML solution, TAKE 5 ML BY MOUTH EVERY 4 HOURS, Disp: , Rfl:     insulin aspart (NovoLOG FlexPen) 100 UNIT/ML solution pen-injector sc pen, ADMINISTER 10 UNITS UNDER THE SKIN THREE  TIMES DAILY WITH MEALS, Disp: , Rfl:     insulin detemir (Levemir FlexTouch) 100 UNIT/ML injection, Inject 18 Units under the skin into the appropriate area as directed Every Night., Disp: 15 mL, Rfl: 6    Insulin Lispro, 1 Unit Dial, (HUMALOG) 100 UNIT/ML solution pen-injector, Inject 6 units with breakfast and lunch, and 8 units with supper with a sliding scale 1 unit for each 50 mg blood sugar over 150 at meals only., Disp: 15 mL, Rfl: 6    Lantus SoloStar 100 UNIT/ML injection pen, ADMINISTER 30 UNITS UNDER THE SKIN EVERY DAY, Disp: , Rfl:     losartan (COZAAR) 100 MG tablet, Take 1 tablet by mouth Daily., Disp: , Rfl:     omega-3 acid ethyl esters (LOVAZA) 1 g capsule, Take 1 capsule by mouth Every 12 (Twelve) Hours., Disp: , Rfl:     Omega-3 Fatty Acids (fish oil) 1000 MG capsule capsule, Take 1 capsule by mouth 2 (Two) Times a Day With Meals., Disp: , Rfl:     pantoprazole (PROTONIX) 40 MG EC tablet, Take 1 tablet by mouth 2 (two) times a day., Disp: , Rfl:     sodium bicarbonate 650 MG tablet, Take 1 tablet by mouth 3 (Three) Times a Day., Disp: , Rfl:     traMADol (ULTRAM) 50 MG tablet, , Disp: , Rfl:     Zinc 50 MG capsule, Take 1 tablet by mouth Daily., Disp: , Rfl:        Provided education on trulicity:  TRULICITY® (dulaglutide)  Medication Expectations   Why am I taking this medication? You are taking Trulicity, along with diet and exercise, to lower blood sugar because you have type 2 diabetes. Diabetes is not curable but with proper medication and treatment, you can keep your blood sugar within your personalized target range. This medication may also reduce the risk of heart attack or stroke   What should I expect while on this medication? You should expect to see your blood sugar and A1c decrease over time and you may also lose some weight.   How does the medication work? Trulicity is a non-insulin injection that works with your body's own ability to lower blood sugar and A1c and helps your body  release its own insulin in response to your blood sugar rising.  This medication also slows down food from leaving your stomach, making you feel sawyer for longer.   How long will I be on this medication for? The amount of time you will be on this medication will be determined by your doctor based on blood sugar and A1c control. You will most likely be on this medication or another diabetes medication throughout your lifetime. Do not abruptly stop this medication without talking to your doctor first.    How do I take this medication? Take as directed on your prescription label. Trulicity is supplied in a single-use pen for each dose and you will use a new pre-filled pen each week.  It is injected under the skin (subcutaneously) of your stomach, thigh or upper arm. Use this medication once weekly, on the same day each week, with or without food.      What are some possible side effects? In addition to decreased appetite, the most common side effects are nausea and constipation.  Redness, itching, and/or swelling can occur where the shot was given. Hypoglycemia can occur, especially if you are taking Trulicity with other medications that cause low blood sugar.    What happens if I miss a dose? If you miss a dose, take it as soon as you remember as long as there are at least 3 days until the next scheduled dose.  If there are less than 3 days, skip the missed dose and resume Trulicity on the regularly scheduled day.  Do not take 2 doses at the same time or extra doses.     Medication Safety   What are things I should warn my doctor immediately about? Do not use Trulicity if you or a family member have ever had medullary thyroid cancer (MTC) or Multiple Endocrine Neoplasia syndrome type 2 (MEN 2).  Tell your doctor if you have or have had problems with your kidneys or pancreas. Stop using Trulicity and get medical help right away if you have severe pain in your stomach area that will not go away. Talk to your doctor if  you are pregnant, planning to become pregnant, or breastfeeding. Get medical help right away if you notice any signs/symptoms of an allergic reaction (rash, hives, difficulty breathing, etc.).   What are things that I should be cautious of? Be cautious of any side effects from this medication. Talk to your doctor if any new ones develop or aren't getting better.   What are some medications that can interact with this one? Taking Trulicity with other medications that also lower your blood sugar such as insulin and glipizide/glimepiride/glyburide may increase the risk of low blood sugar. Your doctor may reduce the dose of these medications when you start Trulicity. Always tell your doctor or pharmacist immediately if you start taking any new medications, including over-the-counter medications, vitamins, and herbal supplements.      Medication Storage/Handling   How should I handle this medication? Keep this medication out of reach of pets/children and keep the pen capped   How does this medication need to be stored? Store Trulicity in the refrigerator. Do not freeze or use Trulicity that has been frozen.  You may store your Trulicity pens at room temperature for up to 14 days.     How should I dispose of this medication? Used Trulicity pens should discarded after each use (for single use only). If your doctor decides to stop this medication, take to your local police station for proper disposal. Some pharmacies also have take-back bins for medication drop-off.     Resources/Support   How can I remind myself to take this medication? You can download reminder apps to help you manage your refills, or set an alarm on your phone to remind you.    Is financial support available?  [employer] can provide co-pay cards if you have commercial insurance or patient assistance if you have Medicare or no insurance.    Which vaccines are recommended for me? Talk to your doctor about these vaccines: Flu, Coronavirus (COVID-19),  Pneumococcal (pneumonia), Tdap, Hepatitis B, Zoster (shingles)           Changes to Therapy Plan Discussed with Patient  Medication Therapy Changes by Provider Discontinue januvia. Start trulicity 0.75 mg under the skin once weekly, then increase to 1.5 mg weekly if able to tolerate. Continue insulin and jardiance.  Discussed pharmacy services with patient and provided our contact info. Also discussed affordability options and offered assistance with PAP application if patient is able to tolerate the medication.   Additional Plans, Therapy Recommendations, or Therapy Problems to Be Addressed: none     Sabrina Santos, PharmD  Clinical Specialty Pharmacist, Endocrinology  10/24/2023  12:06 EDT

## 2023-10-24 NOTE — PROGRESS NOTES
Endocrine Progress Note Outpatient     Patient Care Team:  Landon Smith MD as PCP - General  Landon Smith MD as PCP - Family Medicine  Cesar Ellis MD as Consulting Physician (Cardiology)    Chief Complaint: Follow-up type 2 diabetes    HPI: This is a 77-year-old female with history of type 2 diabetes, hypertension, hyperlipidemia and CKD stage IV disease is here for follow-up.    For type 2 diabetes: Initial diagnosis in June 2020.  She did have diabetes education in the past.  She is currently on Lantus 18 units daily in the evening along with Humalog 6 units with each meal as well as Januvia 25 mg once a day and Jardiance 10 mg once a day.  She is currently using freestyle jareth sensor system.  I have reviewed her reader from the phone.  Last 14-day average blood sugar was 152.  And for last 14 days she is spending 79% in the range and 21% above range and 0% below range.    Hyperlipidemia: Currently on atorvastatin    Hypertension: Well-controlled.    CKD stage IV disease: Follows with Dr. Ford.  She did talk to him about GLP-1 agonist therapy and he is okay to use those.       Past Medical History:   Diagnosis Date    Arthritis     Cataract     CKD (chronic kidney disease)     Diabetes mellitus     Hyperlipidemia     Hypertension        Social History     Socioeconomic History    Marital status:      Spouse name: Tyrell    Number of children: 1    Years of education: 12   Tobacco Use    Smoking status: Never    Smokeless tobacco: Never   Vaping Use    Vaping Use: Never used   Substance and Sexual Activity    Alcohol use: Never    Drug use: Never    Sexual activity: Defer       Family History   Problem Relation Age of Onset    Kidney disease Mother     Thyroid disease Mother     Hypertension Father     Diabetes Father     Thyroid disease Sister     Hyperlipidemia Sister     Hypertension Sister     Heart disease Sister     Diabetes Sister     Arrhythmia Sister         has pacemaker    Kidney  disease Brother     Hypertension Brother     Diabetes Brother     Heart disease Brother         bypass sx    Hyperlipidemia Brother        Allergies   Allergen Reactions    Ibuprofen Other (See Comments) and Nausea And Vomiting     Kidney and liver problems       ROS:   Constitutional:  Denies fatigue, tiredness.    Eyes:  Denies change in visual acuity   HENT:  Denies nasal congestion or sore throat   Respiratory: denies cough, shortness of breath.   Cardiovascular:  denies chest pain, edema   GI:  Denies abdominal pain, nausea, vomiting.   Musculoskeletal:  Denies back pain or joint pain   Integument:  Denies dry skin and rash   Neurologic:  Denies headache, focal weakness or sensory changes   Endocrine:  Denies polyuria or polydipsia   Psychiatric:  Denies depression or anxiety      Vitals:    10/24/23 0922   BP: 135/68   Pulse: 75   SpO2: 98%     Body mass index is 31.78 kg/m².     Physical Exam:  GEN: NAD, conversant  EYES: EOMI, PERRL,  NECK: no thyromegaly,   CV: RRR  LUNG: CTA  SKIN: no rashes, no acanthosis  MSK: no deformities,   NEURO: no tremors, DTR normal  PSYCH: Awake and coherent      Results Review:     I reviewed the patient's new clinical results.    Lab Results   Component Value Date    HGBA1C 7.80 (H) 10/17/2023      Lab Results   Component Value Date    GLUCOSE 150 (H) 10/17/2023    BUN 35 (H) 10/17/2023    CREATININE 2.45 (H) 10/17/2023    EGFRIFNONA 27 (L) 05/28/2021    BCR 14.3 10/17/2023    K 4.3 10/17/2023    CO2 25.1 10/17/2023    CALCIUM 9.1 10/17/2023    ALBUMIN 3.7 10/17/2023    AST 17 10/17/2023    ALT 9 10/17/2023    CHOL 142 10/17/2023    TRIG 106 10/17/2023    LDL 61 10/17/2023    HDL 62 (H) 10/17/2023       Medication Review: Reviewed.       Current Outpatient Medications:     amLODIPine (NORVASC) 5 MG tablet, TAKE 1 TABLET BY MOUTH DAILY, Disp: 90 tablet, Rfl: 3    aspirin 81 MG EC tablet, Take 1 tablet by mouth Every Other Day., Disp: , Rfl:     atorvastatin (LIPITOR) 40 MG  tablet, Take 1 tablet by mouth Daily., Disp: , Rfl:     BD Pen Needle Melissa 2nd Gen 32G X 4 MM misc, 4 (Four) Times a Day., Disp: , Rfl:     carvedilol (Coreg) 6.25 MG tablet, Take 1 tablet by mouth 2 (Two) Times a Day With Meals., Disp: 60 tablet, Rfl: 0    chlorhexidine (PERIDEX) 0.12 % solution, , Disp: , Rfl:     cholecalciferol (VITAMIN D3) 25 MCG (1000 UT) tablet, Take 2 tablets by mouth 2 (Two) Times a Day., Disp: , Rfl:     Continuous Blood Gluc  (FreeStyle Dejah 2 [place]) device, 1 each Continuous., Disp: 1 each, Rfl: 0    Continuous Blood Gluc Sensor (FreeStyle Dejah 2 Sensor) misc, 1 each Every 14 (Fourteen) Days., Disp: 6 each, Rfl: 4    empagliflozin (Jardiance) 10 MG tablet tablet, Take  by mouth Daily., Disp: , Rfl:     ferrous sulfate 324 (65 Fe) MG tablet delayed-release EC tablet, Take 1 tablet by mouth Daily With Breakfast., Disp: , Rfl:     glucagon (GLUCAGEN) 1 MG injection, Inject 1 mg into the appropriate muscle as directed by prescriber 1 (One) Time As Needed., Disp: , Rfl:     Glucagon (Gvoke HypoPen 2-Pack) 1 MG/0.2ML solution auto-injector, Inject 1 mg under the skin into the appropriate area as directed As Needed (for a low blood sugar)., Disp: 0.2 mL, Rfl: 1    Glucagon (Gvoke PFS) 1 MG/0.2ML solution prefilled syringe, INJECT 1 MG UNDER THE SKIN INTO THE APPROPRIATE AREA AS DIRECTED AS NEEDED FOR LOW BLOOD SUGAR, Disp: , Rfl:     HYDROcodone-acetaminophen (NORCO) 5-325 MG per tablet, Take 1 tablet by mouth 3 (Three) Times a Day., Disp: , Rfl:     Hydromet 5-1.5 MG/5ML solution, TAKE 5 ML BY MOUTH EVERY 4 HOURS, Disp: , Rfl:     insulin aspart (NovoLOG FlexPen) 100 UNIT/ML solution pen-injector sc pen, ADMINISTER 10 UNITS UNDER THE SKIN THREE TIMES DAILY WITH MEALS, Disp: , Rfl:     insulin detemir (Levemir FlexTouch) 100 UNIT/ML injection, Inject 18 Units under the skin into the appropriate area as directed Every Night., Disp: 15 mL, Rfl: 6    Insulin Lispro, 1 Unit Dial,  (HUMALOG) 100 UNIT/ML solution pen-injector, Inject 6 units with breakfast and lunch, and 8 units with supper with a sliding scale 1 unit for each 50 mg blood sugar over 150 at meals only., Disp: 15 mL, Rfl: 6    Januvia 100 MG tablet, Take 1 tablet by mouth Daily., Disp: , Rfl:     Lantus SoloStar 100 UNIT/ML injection pen, ADMINISTER 30 UNITS UNDER THE SKIN EVERY DAY, Disp: , Rfl:     losartan (COZAAR) 100 MG tablet, Take 1 tablet by mouth Daily., Disp: , Rfl:     omega-3 acid ethyl esters (LOVAZA) 1 g capsule, Take 1 capsule by mouth Every 12 (Twelve) Hours., Disp: , Rfl:     Omega-3 Fatty Acids (fish oil) 1000 MG capsule capsule, Take 1 capsule by mouth 2 (Two) Times a Day With Meals., Disp: , Rfl:     pantoprazole (PROTONIX) 40 MG EC tablet, Take 1 tablet by mouth 2 (two) times a day., Disp: , Rfl:     SITagliptin (Januvia) 25 MG tablet, Take 1 tablet by mouth Daily., Disp: , Rfl:     sodium bicarbonate 650 MG tablet, Take 1 tablet by mouth 3 (Three) Times a Day., Disp: , Rfl:     traMADol (ULTRAM) 50 MG tablet, , Disp: , Rfl:     Zinc 50 MG capsule, Take 1 tablet by mouth Daily., Disp: , Rfl:     amoxicillin-clavulanate (AUGMENTIN) 875-125 MG per tablet, Take 1 tablet by mouth Every 12 (Twelve) Hours. (Patient not taking: Reported on 10/24/2023), Disp: , Rfl:     cephalexin (KEFLEX) 500 MG capsule, Take 1 capsule by mouth 3 (Three) Times a Day. (Patient not taking: Reported on 10/24/2023), Disp: , Rfl:     oxyCODONE-acetaminophen (PERCOCET) 7.5-325 MG per tablet, Take 1 tablet twice a day by oral route. (Patient not taking: Reported on 10/24/2023), Disp: , Rfl:           Assessment and plan:  Diabetes mellitus type 2 with hyperglycemia: Uncontrolled with high A1c of 7.8%.  At this time I will DC Januvia, add Trulicity 0.75 mg subcu weekly.  We will continue Lantus/Levemir, Humalog as well as Jardiance for now.  She is advised to continue to work on diet and activity and always keep glucose source in case of  low blood sugars.    Hypertension: Fair control, continue current medications.    Hyperlipidemia: Currently on atorvastatin, follow lipid panel    CKD stage IV disease: Follows with Dr. Ford.           Dieudonne Sellers MD FACE.

## 2023-12-19 ENCOUNTER — LAB (OUTPATIENT)
Dept: LAB | Facility: HOSPITAL | Age: 78
End: 2023-12-19
Payer: MEDICARE

## 2024-01-05 ENCOUNTER — OFFICE VISIT (OUTPATIENT)
Dept: ENDOCRINOLOGY | Facility: CLINIC | Age: 79
End: 2024-01-05
Payer: MEDICARE

## 2024-01-05 VITALS
SYSTOLIC BLOOD PRESSURE: 122 MMHG | HEART RATE: 62 BPM | OXYGEN SATURATION: 96 % | HEIGHT: 65 IN | DIASTOLIC BLOOD PRESSURE: 68 MMHG | BODY MASS INDEX: 30.82 KG/M2 | WEIGHT: 185 LBS

## 2024-01-05 DIAGNOSIS — I10 BENIGN ESSENTIAL HYPERTENSION: ICD-10-CM

## 2024-01-05 DIAGNOSIS — E11.65 TYPE 2 DIABETES MELLITUS WITH HYPERGLYCEMIA, WITH LONG-TERM CURRENT USE OF INSULIN: Primary | ICD-10-CM

## 2024-01-05 DIAGNOSIS — N18.4 STAGE 4 CHRONIC KIDNEY DISEASE: Chronic | ICD-10-CM

## 2024-01-05 DIAGNOSIS — Z79.4 TYPE 2 DIABETES MELLITUS WITH HYPERGLYCEMIA, WITH LONG-TERM CURRENT USE OF INSULIN: Primary | ICD-10-CM

## 2024-01-05 DIAGNOSIS — E78.2 MIXED HYPERLIPIDEMIA: ICD-10-CM

## 2024-01-05 RX ORDER — FLUCONAZOLE 200 MG/1
TABLET ORAL
COMMUNITY
Start: 2023-11-16

## 2024-01-05 RX ORDER — DULAGLUTIDE 1.5 MG/.5ML
1.5 INJECTION, SOLUTION SUBCUTANEOUS
Qty: 2 ML | Refills: 6 | Status: SHIPPED | OUTPATIENT
Start: 2024-01-05

## 2024-01-05 NOTE — PROGRESS NOTES
Endocrine Progress Note Outpatient     Patient Care Team:  Landon Smith MD as PCP - General  Landon Smith MD as PCP - Family Medicine  Cesar Ellis MD as Consulting Physician (Cardiology)    Chief Complaint: Follow-up type 2 diabetes    HPI: This is a 78-year-old female with history of type 2 diabetes, hypertension, hyperlipidemia and CKD stage IV disease is here for follow-up.    For type 2 diabetes: Initial diagnosis in June 2020.  She did have diabetes education in the past.  She is currently on Lantus 18 units daily in the evening along with Humalog 6 units with each meal as well as Trulicity 0.75 mg subcu weekly.  She is currently using freestyle jareth sensor system.  I reviewed her jareth hung from the phone.  Last 14-day average blood sugar is 154.  For the last 14 days she is spending 75% in the range and 25% above range and 0% below range.    Hyperlipidemia: Currently on atorvastatin    Hypertension: Well-controlled.    CKD stage IV disease: Follows with Dr. Ford.  She did talk to him about GLP-1 agonist therapy and he is okay to use those.       Past Medical History:   Diagnosis Date    Arthritis     Cataract     CKD (chronic kidney disease)     Diabetes mellitus     Hyperlipidemia     Hypertension        Social History     Socioeconomic History    Marital status:      Spouse name: Tyrell    Number of children: 1    Years of education: 12   Tobacco Use    Smoking status: Never    Smokeless tobacco: Never   Vaping Use    Vaping Use: Never used   Substance and Sexual Activity    Alcohol use: Never    Drug use: Never    Sexual activity: Defer       Family History   Problem Relation Age of Onset    Kidney disease Mother     Thyroid disease Mother     Hypertension Father     Diabetes Father     Thyroid disease Sister     Hyperlipidemia Sister     Hypertension Sister     Heart disease Sister     Diabetes Sister     Arrhythmia Sister         has pacemaker    Kidney disease Brother      Hypertension Brother     Diabetes Brother     Heart disease Brother         bypass sx    Hyperlipidemia Brother        Allergies   Allergen Reactions    Ibuprofen Other (See Comments) and Nausea And Vomiting     Kidney and liver problems       ROS:   Constitutional:  Denies fatigue, tiredness.    Eyes:  Denies change in visual acuity   HENT:  Denies nasal congestion or sore throat   Respiratory: denies cough, shortness of breath.   Cardiovascular:  denies chest pain, edema   GI:  Denies abdominal pain, nausea, vomiting.   Musculoskeletal:  Denies back pain or joint pain   Integument:  Denies dry skin and rash   Neurologic:  Denies headache, focal weakness or sensory changes   Endocrine:  Denies polyuria or polydipsia   Psychiatric:  Denies depression or anxiety      Vitals:    01/05/24 1142   BP: 122/68   Pulse: 62   SpO2: 96%     Body mass index is 30.79 kg/m².     Physical Exam:  GEN: NAD, conversant  EYES: EOMI, PERRL,  NECK: no thyromegaly,   CV: RRR  LUNG: CTA  SKIN: no rashes, no acanthosis  MSK: no deformities,   NEURO: no tremors, DTR normal  PSYCH: Awake and coherent      Results Review:     I reviewed the patient's new clinical results.    Lab Results   Component Value Date    HGBA1C 7.80 (H) 10/17/2023      Lab Results   Component Value Date    GLUCOSE 150 (H) 10/17/2023    BUN 35 (H) 10/17/2023    CREATININE 2.45 (H) 10/17/2023    EGFRIFNONA 27 (L) 05/28/2021    BCR 14.3 10/17/2023    K 4.3 10/17/2023    CO2 25.1 10/17/2023    CALCIUM 9.1 10/17/2023    ALBUMIN 3.7 10/17/2023    AST 17 10/17/2023    ALT 9 10/17/2023    CHOL 142 10/17/2023    TRIG 106 10/17/2023    LDL 61 10/17/2023    HDL 62 (H) 10/17/2023       Medication Review: Reviewed.       Current Outpatient Medications:     amLODIPine (NORVASC) 5 MG tablet, TAKE 1 TABLET BY MOUTH DAILY, Disp: 90 tablet, Rfl: 3    aspirin 81 MG EC tablet, Take 1 tablet by mouth Every Other Day., Disp: , Rfl:     atorvastatin (LIPITOR) 40 MG tablet, Take 1 tablet by  mouth Daily., Disp: , Rfl:     BD Pen Needle Melissa 2nd Gen 32G X 4 MM misc, 4 (Four) Times a Day., Disp: , Rfl:     carvedilol (Coreg) 6.25 MG tablet, Take 1 tablet by mouth 2 (Two) Times a Day With Meals., Disp: 60 tablet, Rfl: 0    cholecalciferol (VITAMIN D3) 25 MCG (1000 UT) tablet, Take 2 tablets by mouth 2 (Two) Times a Day., Disp: , Rfl:     Continuous Blood Gluc  (FreeStyle Dejah 2 [place]) device, 1 each Continuous., Disp: 1 each, Rfl: 0    Continuous Blood Gluc Sensor (FreeStyle Dejah 2 Sensor) misc, 1 each Every 14 (Fourteen) Days., Disp: 6 each, Rfl: 4    Dulaglutide (Trulicity) 0.75 MG/0.5ML solution pen-injector, Inject 0.75 mg under the skin into the appropriate area as directed 1 (One) Time Per Week., Disp: 2 mL, Rfl: 6    empagliflozin (Jardiance) 10 MG tablet tablet, Take  by mouth Daily., Disp: , Rfl:     ferrous sulfate 324 (65 Fe) MG tablet delayed-release EC tablet, Take 1 tablet by mouth Daily With Breakfast., Disp: , Rfl:     fluconazole (DIFLUCAN) 200 MG tablet, , Disp: , Rfl:     glucagon (GLUCAGEN) 1 MG injection, Inject 1 mg into the appropriate muscle as directed by prescriber 1 (One) Time As Needed., Disp: , Rfl:     Glucagon (Gvoke HypoPen 2-Pack) 1 MG/0.2ML solution auto-injector, Inject 1 mg under the skin into the appropriate area as directed As Needed (for a low blood sugar)., Disp: 0.2 mL, Rfl: 1    Glucagon (Gvoke PFS) 1 MG/0.2ML solution prefilled syringe, INJECT 1 MG UNDER THE SKIN INTO THE APPROPRIATE AREA AS DIRECTED AS NEEDED FOR LOW BLOOD SUGAR, Disp: , Rfl:     Insulin Lispro, 1 Unit Dial, (HUMALOG) 100 UNIT/ML solution pen-injector, Inject 6 units with breakfast and lunch, and 8 units with supper with a sliding scale 1 unit for each 50 mg blood sugar over 150 at meals only., Disp: 15 mL, Rfl: 6    Lantus SoloStar 100 UNIT/ML injection pen, ADMINISTER 30 UNITS UNDER THE SKIN EVERY DAY, Disp: , Rfl:     losartan (COZAAR) 100 MG tablet, Take 1 tablet by mouth Daily.,  Disp: , Rfl:     omega-3 acid ethyl esters (LOVAZA) 1 g capsule, Take 1 capsule by mouth Every 12 (Twelve) Hours., Disp: , Rfl:     Omega-3 Fatty Acids (fish oil) 1000 MG capsule capsule, Take 1 capsule by mouth 2 (Two) Times a Day With Meals., Disp: , Rfl:     pantoprazole (PROTONIX) 40 MG EC tablet, Take 1 tablet by mouth 2 (two) times a day., Disp: , Rfl:     sodium bicarbonate 650 MG tablet, Take 1 tablet by mouth 3 (Three) Times a Day., Disp: , Rfl:     traMADol (ULTRAM) 50 MG tablet, , Disp: , Rfl:     Zinc 50 MG capsule, Take 1 tablet by mouth Daily., Disp: , Rfl:           Assessment and plan:  Diabetes mellitus type 2 with hyperglycemia: Uncontrolled but improving, will increase Trulicity to 1.5 mg subcu weekly.  She will continue current dose of Lantus and Humalog however if her blood sugars are to start running less than 100 then we will adjust the insulin dose and she will notify us.  She is advised to always keep glucose source in case of low blood sugars.  Will follow A1c.    Hypertension: Fair control, continue current medications.    Hyperlipidemia: Currently on atorvastatin, follow lipid panel    CKD stage IV disease: Follows with Dr. Ford.     Assessment and plan from October 24, 2023 reviewed and updated.      Dieudonne Sellers MD FACE.

## 2024-01-05 NOTE — PATIENT INSTRUCTIONS
Increase Trulicity to 1.5 mg subcu weekly  Drink plenty of water  Continue current dose of insulins  Watch for low blood sugars and if your blood sugars are start running less than 100 then notify us so we can adjust the insulin dose.  Always keep glucose source in case of low blood sugar  Labs before follow-up.

## 2024-01-18 ENCOUNTER — TELEPHONE (OUTPATIENT)
Dept: ENDOCRINOLOGY | Facility: CLINIC | Age: 79
End: 2024-01-18

## 2024-01-22 LAB — HBA1C MFR BLD: 7.3 %

## 2024-01-30 ENCOUNTER — OFFICE VISIT (OUTPATIENT)
Dept: CARDIOLOGY | Facility: CLINIC | Age: 79
End: 2024-01-30
Payer: MEDICARE

## 2024-01-30 VITALS
HEART RATE: 60 BPM | SYSTOLIC BLOOD PRESSURE: 133 MMHG | DIASTOLIC BLOOD PRESSURE: 76 MMHG | BODY MASS INDEX: 30.66 KG/M2 | HEIGHT: 65 IN | OXYGEN SATURATION: 100 % | WEIGHT: 184 LBS

## 2024-01-30 DIAGNOSIS — E78.00 PURE HYPERCHOLESTEROLEMIA: ICD-10-CM

## 2024-01-30 DIAGNOSIS — R00.2 PALPITATIONS: ICD-10-CM

## 2024-01-30 DIAGNOSIS — I10 ESSENTIAL HYPERTENSION: Primary | ICD-10-CM

## 2024-01-30 DIAGNOSIS — E11.9 TYPE 2 DIABETES MELLITUS WITHOUT COMPLICATION, WITHOUT LONG-TERM CURRENT USE OF INSULIN: ICD-10-CM

## 2024-01-30 PROCEDURE — 1160F RVW MEDS BY RX/DR IN RCRD: CPT | Performed by: INTERNAL MEDICINE

## 2024-01-30 PROCEDURE — 99214 OFFICE O/P EST MOD 30 MIN: CPT | Performed by: INTERNAL MEDICINE

## 2024-01-30 PROCEDURE — 3075F SYST BP GE 130 - 139MM HG: CPT | Performed by: INTERNAL MEDICINE

## 2024-01-30 PROCEDURE — 3078F DIAST BP <80 MM HG: CPT | Performed by: INTERNAL MEDICINE

## 2024-01-30 PROCEDURE — 1159F MED LIST DOCD IN RCRD: CPT | Performed by: INTERNAL MEDICINE

## 2024-01-30 NOTE — PROGRESS NOTES
"    Subjective:     Encounter Date:2024      Patient ID: Neeta Harrell is a 78 y.o. female.    Chief Complaint:  Hypertension  Pertinent negatives include no chest pain, headaches, malaise/fatigue, palpitations or shortness of breath.   Seven 8-year-old white female with history of palpitations arrhythmia hypertension hyperlipidemia diabetes presents to the office for follow-up.  Patient is currently stable without any symptoms of chest pain or shortness of breath at rest or exertion.  No complaint of PND orthopnea.  No palpitation dizziness syncope patient has some swelling of the feet.  Patient is taking all the medicines regular.  Patient does not smoke.    The following portions of the patient's history were reviewed and updated as appropriate: allergies, current medications, past family history, past medical history, past social history, past surgical history, and problem list.  Past Medical History:   Diagnosis Date    Arthritis     Cataract     CKD (chronic kidney disease)     Diabetes mellitus     Hyperlipidemia     Hypertension      Past Surgical History:   Procedure Laterality Date    ANKLE OPEN REDUCTION INTERNAL FIXATION Left 2022    Procedure: ANKLE OPEN REDUCTION INTERNAL FIXATION;  Surgeon: Tyrell Galarza MD;  Location: [facility] OR;  Service: Orthopedics;  Laterality: Left;    CATARACT EXTRACTION       SECTION      x2    CHOLECYSTECTOMY      HYSTERECTOMY      INCONTINENCE SURGERY      TONSILLECTOMY       /76   Pulse 60   Ht 165.1 cm (65\")   Wt 83.5 kg (184 lb)   SpO2 100%   BMI 30.62 kg/m²   Family History   Problem Relation Age of Onset    Kidney disease Mother     Thyroid disease Mother     Hypertension Father     Diabetes Father     Thyroid disease Sister     Hyperlipidemia Sister     Hypertension Sister     Heart disease Sister     Diabetes Sister     Arrhythmia Sister         has pacemaker    Kidney disease Brother     Hypertension Brother     Diabetes " Brother     Heart disease Brother         bypass sx    Hyperlipidemia Brother        Current Outpatient Medications:     amLODIPine (NORVASC) 5 MG tablet, TAKE 1 TABLET BY MOUTH DAILY, Disp: 90 tablet, Rfl: 3    aspirin 81 MG EC tablet, Take 1 tablet by mouth Every Other Day., Disp: , Rfl:     atorvastatin (LIPITOR) 40 MG tablet, Take 1 tablet by mouth Daily., Disp: , Rfl:     BD Pen Needle Melissa 2nd Gen 32G X 4 MM misc, 4 (Four) Times a Day., Disp: , Rfl:     carvedilol (Coreg) 6.25 MG tablet, Take 1 tablet by mouth 2 (Two) Times a Day With Meals., Disp: 60 tablet, Rfl: 0    cholecalciferol (VITAMIN D3) 25 MCG (1000 UT) tablet, Take 2 tablets by mouth 2 (Two) Times a Day., Disp: , Rfl:     Continuous Blood Gluc  (FreeStyle Dejah 2 [place]) device, 1 each Continuous., Disp: 1 each, Rfl: 0    Continuous Blood Gluc Sensor (FreeStyle Dejah 2 Sensor) misc, 1 each Every 14 (Fourteen) Days., Disp: 6 each, Rfl: 4    empagliflozin (Jardiance) 10 MG tablet tablet, Take  by mouth Daily., Disp: , Rfl:     ferrous sulfate 324 (65 Fe) MG tablet delayed-release EC tablet, Take 1 tablet by mouth Daily With Breakfast., Disp: , Rfl:     glucagon (GLUCAGEN) 1 MG injection, Inject 1 mg into the appropriate muscle as directed by prescriber 1 (One) Time As Needed., Disp: , Rfl:     Glucagon (Gvoke HypoPen 2-Pack) 1 MG/0.2ML solution auto-injector, Inject 1 mg under the skin into the appropriate area as directed As Needed (for a low blood sugar)., Disp: 0.2 mL, Rfl: 1    Glucagon (Gvoke PFS) 1 MG/0.2ML solution prefilled syringe, INJECT 1 MG UNDER THE SKIN INTO THE APPROPRIATE AREA AS DIRECTED AS NEEDED FOR LOW BLOOD SUGAR, Disp: , Rfl:     Insulin Lispro, 1 Unit Dial, (HUMALOG) 100 UNIT/ML solution pen-injector, Inject 6 units with breakfast and lunch, and 8 units with supper with a sliding scale 1 unit for each 50 mg blood sugar over 150 at meals only., Disp: 15 mL, Rfl: 6    Lantus SoloStar 100 UNIT/ML injection pen, ADMINISTER  30 UNITS UNDER THE SKIN EVERY DAY, Disp: , Rfl:     losartan (COZAAR) 100 MG tablet, Take 1 tablet by mouth Daily., Disp: , Rfl:     omega-3 acid ethyl esters (LOVAZA) 1 g capsule, Take 1 capsule by mouth Every 12 (Twelve) Hours., Disp: , Rfl:     Omega-3 Fatty Acids (fish oil) 1000 MG capsule capsule, Take 1 capsule by mouth 2 (Two) Times a Day With Meals., Disp: , Rfl:     pantoprazole (PROTONIX) 40 MG EC tablet, Take 1 tablet by mouth 2 (two) times a day., Disp: , Rfl:     sodium bicarbonate 650 MG tablet, Take 1 tablet by mouth 3 (Three) Times a Day., Disp: , Rfl:     Tirzepatide (MOUNJARO) 5 MG/0.5ML solution pen-injector pen, Inject 0.5 mL under the skin into the appropriate area as directed 1 (One) Time Per Week., Disp: 2 mL, Rfl: 2    Zinc 50 MG capsule, Take 1 tablet by mouth Daily., Disp: , Rfl:     fluconazole (DIFLUCAN) 200 MG tablet, , Disp: , Rfl:     traMADol (ULTRAM) 50 MG tablet, , Disp: , Rfl:   Allergies   Allergen Reactions    Ibuprofen Other (See Comments) and Nausea And Vomiting     Kidney and liver problems     Social History     Socioeconomic History    Marital status:      Spouse name: Tyrell    Number of children: 1    Years of education: 12   Tobacco Use    Smoking status: Never    Smokeless tobacco: Never   Vaping Use    Vaping Use: Never used   Substance and Sexual Activity    Alcohol use: Never    Drug use: Never    Sexual activity: Defer     Review of Systems   Constitutional: Negative for malaise/fatigue.   Cardiovascular:  Negative for chest pain, dyspnea on exertion, leg swelling and palpitations.   Respiratory:  Negative for cough and shortness of breath.    Gastrointestinal:  Negative for abdominal pain, nausea and vomiting.   Neurological:  Negative for dizziness, focal weakness, headaches, light-headedness and numbness.   All other systems reviewed and are negative.             Objective:     Constitutional:       Appearance: Well-developed.   Eyes:      General: No scleral  icterus.     Conjunctiva/sclera: Conjunctivae normal.   HENT:      Head: Normocephalic and atraumatic.   Neck:      Vascular: No carotid bruit or JVD.   Pulmonary:      Effort: Pulmonary effort is normal.      Breath sounds: Normal breath sounds. No wheezing. No rales.   Cardiovascular:      Normal rate. Regular rhythm.   Pulses:     Intact distal pulses.   Abdominal:      General: Bowel sounds are normal.      Palpations: Abdomen is soft.   Musculoskeletal:      Cervical back: Normal range of motion and neck supple. Skin:     General: Skin is warm and dry.      Findings: No rash.   Neurological:      Mental Status: Alert.       Procedures    Lab Review:         MDM    #1 palpitations  Patient has history of palpitation in the past but since she is on carvedilol she is not having any more symptoms  2.  Diabetes  Patient is on multiple medications and followed by the primary care doctor  3.  Hypertension  Patient blood pressure currently stable.      4.  Hyperlipidemia  Patient on atorvastatin the lipid levels are well within normal limits    Patient's previous medical records, labs, and EKG were reviewed and discussed with the patient at today's visit.

## 2024-02-09 ENCOUNTER — TELEPHONE (OUTPATIENT)
Dept: ENDOCRINOLOGY | Facility: CLINIC | Age: 79
End: 2024-02-09
Payer: MEDICARE

## 2024-02-09 RX ORDER — DULAGLUTIDE 3 MG/.5ML
3 INJECTION, SOLUTION SUBCUTANEOUS WEEKLY
Qty: 6 ML | Refills: 1 | Status: SHIPPED | OUTPATIENT
Start: 2024-02-09

## 2024-02-09 NOTE — TELEPHONE ENCOUNTER
Specialty Pharmacy Patient Management Program       Neeta Harrell is a 78 y.o. female seen by an Endocrinology provider for Type 2 Diabetes and enrolled in the Endocrinology Patient Management program offered by [facility].      Requested Prescriptions     Signed Prescriptions Disp Refills    Dulaglutide (Trulicity) 3 MG/0.5ML solution pen-injector 6 mL 1     Sig: Inject 0.5 mL under the skin into the appropriate area as directed 1 (One) Time Per Week.     Authorizing Provider: CODIE SELLERS     Ordering User: DEANA PINON       Refill sent in to patient's pharmacy for above medication prescribed by Dr. Sellers.   Last office visit 1/524.  Next visit scheduled 7/25/24.    Deana Pinon, PharmD, BCPS, BCCCP  Clinical Specialty Pharmacist, Endocrinology  2/9/2024  12:36 EST

## 2024-03-15 ENCOUNTER — TELEPHONE (OUTPATIENT)
Dept: ENDOCRINOLOGY | Facility: CLINIC | Age: 79
End: 2024-03-15

## 2024-03-15 NOTE — TELEPHONE ENCOUNTER
Patient called the office in regards to her Trulicity. Patient says that she needs to take a shot on Sunday, and she went to [facility] [address]. The pharmacy said that they were out of stock. This MA allowed the patient to know that she would need to call any [facility] around the area to see if they have any in stock (patient says that she has to use [facility] in regards to prescription coverage for Medicare [facility]). If there's none in stock, then she would need to call her insurance to see what alternative for GLP-1 class would cover. Patient expresses understanding at this time.

## 2024-07-18 ENCOUNTER — LAB (OUTPATIENT)
Dept: LAB | Facility: HOSPITAL | Age: 79
End: 2024-07-18
Payer: MEDICARE

## 2024-07-18 DIAGNOSIS — E11.65 TYPE 2 DIABETES MELLITUS WITH HYPERGLYCEMIA, WITH LONG-TERM CURRENT USE OF INSULIN: ICD-10-CM

## 2024-07-18 DIAGNOSIS — Z79.4 TYPE 2 DIABETES MELLITUS WITH HYPERGLYCEMIA, WITH LONG-TERM CURRENT USE OF INSULIN: ICD-10-CM

## 2024-07-18 LAB
ALBUMIN SERPL-MCNC: 3.8 G/DL (ref 3.5–5.2)
ALBUMIN UR-MCNC: 14.9 MG/DL
ALBUMIN/GLOB SERPL: 1.2 G/DL
ALP SERPL-CCNC: 33 U/L (ref 39–117)
ALT SERPL W P-5'-P-CCNC: 14 U/L (ref 1–33)
ANION GAP SERPL CALCULATED.3IONS-SCNC: 11 MMOL/L (ref 5–15)
AST SERPL-CCNC: 20 U/L (ref 1–32)
BILIRUB SERPL-MCNC: 0.5 MG/DL (ref 0–1.2)
BUN SERPL-MCNC: 39 MG/DL (ref 8–23)
BUN/CREAT SERPL: 19 (ref 7–25)
CALCIUM SPEC-SCNC: 9.9 MG/DL (ref 8.6–10.5)
CHLORIDE SERPL-SCNC: 103 MMOL/L (ref 98–107)
CHOLEST SERPL-MCNC: 151 MG/DL (ref 0–200)
CO2 SERPL-SCNC: 25 MMOL/L (ref 22–29)
CREAT SERPL-MCNC: 2.05 MG/DL (ref 0.57–1)
CREAT UR-MCNC: 60.6 MG/DL
EGFRCR SERPLBLD CKD-EPI 2021: 24.7 ML/MIN/1.73
GLOBULIN UR ELPH-MCNC: 3.3 GM/DL
GLUCOSE SERPL-MCNC: 116 MG/DL (ref 65–99)
HBA1C MFR BLD: 7.17 % (ref 4.8–5.6)
HDLC SERPL-MCNC: 53 MG/DL (ref 40–60)
LDLC SERPL CALC-MCNC: 78 MG/DL (ref 0–100)
LDLC/HDLC SERPL: 1.43 {RATIO}
MICROALBUMIN/CREAT UR: 245.9 MG/G (ref 0–29)
POTASSIUM SERPL-SCNC: 4.5 MMOL/L (ref 3.5–5.2)
PROT SERPL-MCNC: 7.1 G/DL (ref 6–8.5)
SODIUM SERPL-SCNC: 139 MMOL/L (ref 136–145)
TRIGL SERPL-MCNC: 112 MG/DL (ref 0–150)
VLDLC SERPL-MCNC: 20 MG/DL (ref 5–40)

## 2024-07-18 PROCEDURE — 36415 COLL VENOUS BLD VENIPUNCTURE: CPT

## 2024-07-18 PROCEDURE — 83036 HEMOGLOBIN GLYCOSYLATED A1C: CPT

## 2024-07-18 PROCEDURE — 82570 ASSAY OF URINE CREATININE: CPT

## 2024-07-18 PROCEDURE — 80053 COMPREHEN METABOLIC PANEL: CPT

## 2024-07-18 PROCEDURE — 80061 LIPID PANEL: CPT

## 2024-07-18 PROCEDURE — 82043 UR ALBUMIN QUANTITATIVE: CPT

## 2024-07-25 ENCOUNTER — OFFICE VISIT (OUTPATIENT)
Dept: ENDOCRINOLOGY | Facility: CLINIC | Age: 79
End: 2024-07-25
Payer: MEDICARE

## 2024-07-25 VITALS
WEIGHT: 173 LBS | HEIGHT: 65 IN | SYSTOLIC BLOOD PRESSURE: 125 MMHG | HEART RATE: 65 BPM | DIASTOLIC BLOOD PRESSURE: 75 MMHG | OXYGEN SATURATION: 98 % | BODY MASS INDEX: 28.82 KG/M2

## 2024-07-25 DIAGNOSIS — E78.2 MIXED HYPERLIPIDEMIA: ICD-10-CM

## 2024-07-25 DIAGNOSIS — E11.65 TYPE 2 DIABETES MELLITUS WITH HYPERGLYCEMIA, WITH LONG-TERM CURRENT USE OF INSULIN: Primary | ICD-10-CM

## 2024-07-25 DIAGNOSIS — N18.4 STAGE 4 CHRONIC KIDNEY DISEASE: Chronic | ICD-10-CM

## 2024-07-25 DIAGNOSIS — I10 BENIGN ESSENTIAL HYPERTENSION: ICD-10-CM

## 2024-07-25 DIAGNOSIS — Z79.4 TYPE 2 DIABETES MELLITUS WITH HYPERGLYCEMIA, WITH LONG-TERM CURRENT USE OF INSULIN: Primary | ICD-10-CM

## 2024-07-25 RX ORDER — CARVEDILOL 12.5 MG/1
1 TABLET ORAL 2 TIMES DAILY
COMMUNITY

## 2024-07-25 RX ORDER — DULAGLUTIDE 3 MG/.5ML
INJECTION, SOLUTION SUBCUTANEOUS
Qty: 6 ML | Refills: 1 | OUTPATIENT
Start: 2024-07-25

## 2024-07-25 RX ORDER — DULAGLUTIDE 3 MG/.5ML
3 INJECTION, SOLUTION SUBCUTANEOUS WEEKLY
Qty: 6 ML | Refills: 3 | Status: SHIPPED | OUTPATIENT
Start: 2024-07-25

## 2024-07-25 NOTE — PROGRESS NOTES
Endocrine Progress Note Outpatient     Patient Care Team:  Landon Smith MD as PCP - General  Landon Smith MD as PCP - Family Medicine  Cesar Ellis MD as Consulting Physician (Cardiology)    Chief Complaint: Follow-up type 2 diabetes    HPI: This is a 78-year-old female with history of type 2 diabetes, hypertension, hyperlipidemia and CKD stage IV disease is here for follow-up.    For type 2 diabetes: Initial diagnosis in June 2020.  She did have diabetes education in the past.  She is currently on Lantus 18 units daily in the evening along with Humalog 6 units with each meal as well as Trulicity 3.0  mg subcu weekly.  Also on Jardiance 10 mg once a day.  She is currently using freestyle jareth sensor system.     Hyperlipidemia: Currently on atorvastatin    Hypertension: Well-controlled.    CKD stage IV disease: Follows with Dr. Ford.  She did talk to him about GLP-1 agonist therapy and he is okay to use those.       Past Medical History:   Diagnosis Date    Arthritis     Cataract     CKD (chronic kidney disease)     Diabetes mellitus     Hyperlipidemia     Hypertension        Social History     Socioeconomic History    Marital status:      Spouse name: Tyrell    Number of children: 1    Years of education: 12   Tobacco Use    Smoking status: Never    Smokeless tobacco: Never   Vaping Use    Vaping status: Never Used   Substance and Sexual Activity    Alcohol use: Never    Drug use: Never    Sexual activity: Defer       Family History   Problem Relation Age of Onset    Kidney disease Mother     Thyroid disease Mother     Hypertension Father     Diabetes Father     Thyroid disease Sister     Hyperlipidemia Sister     Hypertension Sister     Heart disease Sister     Diabetes Sister     Arrhythmia Sister         has pacemaker    Kidney disease Brother     Hypertension Brother     Diabetes Brother     Heart disease Brother         bypass sx    Hyperlipidemia Brother        Allergies   Allergen  Reactions    Ibuprofen Other (See Comments) and Nausea And Vomiting     Kidney and liver problems       ROS:   Constitutional:  Denies fatigue, tiredness.    Eyes:  Denies change in visual acuity   HENT:  Denies nasal congestion or sore throat   Respiratory: denies cough, shortness of breath.   Cardiovascular:  denies chest pain, edema   GI:  Denies abdominal pain, nausea, vomiting.   Musculoskeletal:  Denies back pain or joint pain   Integument:  Denies dry skin and rash   Neurologic:  Denies headache, focal weakness or sensory changes   Endocrine:  Denies polyuria or polydipsia   Psychiatric:  Denies depression or anxiety      Vitals:    07/25/24 1359   BP: 125/75   Pulse: 65   SpO2: 98%       Body mass index is 28.79 kg/m².     Physical Exam:  GEN: NAD, conversant  EYES: EOMI, PERRL,  NECK: no thyromegaly,   CV: RRR  LUNG: CTA  PSYCH: Awake and coherent      Results Review:     I reviewed the patient's new clinical results.    Lab Results   Component Value Date    HGBA1C 7.17 (H) 07/18/2024    HGBA1C 7.3 01/12/2024    HGBA1C 7.80 (H) 10/17/2023      Lab Results   Component Value Date    GLUCOSE 116 (H) 07/18/2024    BUN 39 (H) 07/18/2024    CREATININE 2.05 (H) 07/18/2024    EGFRIFNONA 27 (L) 05/28/2021    BCR 19.0 07/18/2024    K 4.5 07/18/2024    CO2 25.0 07/18/2024    CALCIUM 9.9 07/18/2024    ALBUMIN 3.8 07/18/2024    AST 20 07/18/2024    ALT 14 07/18/2024    CHOL 151 07/18/2024    TRIG 112 07/18/2024    LDL 78 07/18/2024    HDL 53 07/18/2024     Freestyle jareth download interpretation: Dates covered was between July 12, 2024 to July 25, 2024.  Average blood sugar was 142.  Spending 87% in the range and 13% above range and 0% below range.  Glucose graph did not show significant fluctuations.      Medication Review: Reviewed.       Current Outpatient Medications:     amLODIPine (NORVASC) 5 MG tablet, TAKE 1 TABLET BY MOUTH DAILY, Disp: 90 tablet, Rfl: 3    aspirin 81 MG EC tablet, Take 1 tablet by mouth Every  Other Day., Disp: , Rfl:     atorvastatin (LIPITOR) 40 MG tablet, Take 1 tablet by mouth Daily., Disp: , Rfl:     BD Pen Needle Melissa 2nd Gen 32G X 4 MM misc, 4 (Four) Times a Day., Disp: , Rfl:     carvedilol (COREG) 12.5 MG tablet, Take 1 tablet by mouth 2 (Two) Times a Day., Disp: , Rfl:     carvedilol (Coreg) 6.25 MG tablet, Take 1 tablet by mouth 2 (Two) Times a Day With Meals., Disp: 60 tablet, Rfl: 0    cholecalciferol (VITAMIN D3) 25 MCG (1000 UT) tablet, Take 2 tablets by mouth 2 (Two) Times a Day., Disp: , Rfl:     Continuous Blood Gluc  (FreeStyle Dejah 2 [place]) device, 1 each Continuous., Disp: 1 each, Rfl: 0    Continuous Blood Gluc Sensor (FreeStyle Dejah 2 Sensor) misc, 1 each Every 14 (Fourteen) Days., Disp: 6 each, Rfl: 4    Dulaglutide (Trulicity) 3 MG/0.5ML solution pen-injector, Inject 0.5 mL under the skin into the appropriate area as directed 1 (One) Time Per Week., Disp: 6 mL, Rfl: 1    empagliflozin (Jardiance) 10 MG tablet tablet, Take  by mouth Daily., Disp: , Rfl:     ferrous sulfate 324 (65 Fe) MG tablet delayed-release EC tablet, Take 1 tablet by mouth Daily With Breakfast., Disp: , Rfl:     glucagon (GLUCAGEN) 1 MG injection, Inject 1 mg into the appropriate muscle as directed by prescriber 1 (One) Time As Needed., Disp: , Rfl:     Glucagon (Gvoke HypoPen 2-Pack) 1 MG/0.2ML solution auto-injector, Inject 1 mg under the skin into the appropriate area as directed As Needed (for a low blood sugar)., Disp: 0.2 mL, Rfl: 1    Glucagon (Gvoke PFS) 1 MG/0.2ML solution prefilled syringe, INJECT 1 MG UNDER THE SKIN INTO THE APPROPRIATE AREA AS DIRECTED AS NEEDED FOR LOW BLOOD SUGAR, Disp: , Rfl:     Insulin Lispro, 1 Unit Dial, (HUMALOG) 100 UNIT/ML solution pen-injector, Inject 6 units with breakfast and lunch, and 8 units with supper with a sliding scale 1 unit for each 50 mg blood sugar over 150 at meals only., Disp: 15 mL, Rfl: 6    Lantus SoloStar 100 UNIT/ML injection pen,  ADMINISTER 30 UNITS UNDER THE SKIN EVERY DAY, Disp: , Rfl:     losartan (COZAAR) 100 MG tablet, Take 1 tablet by mouth Daily., Disp: , Rfl:     omega-3 acid ethyl esters (LOVAZA) 1 g capsule, Take 1 capsule by mouth Every 12 (Twelve) Hours., Disp: , Rfl:     pantoprazole (PROTONIX) 40 MG EC tablet, Take 1 tablet by mouth 2 (two) times a day., Disp: , Rfl:     sodium bicarbonate 650 MG tablet, Take 1 tablet by mouth 3 (Three) Times a Day., Disp: , Rfl:     Zinc 50 MG capsule, Take 1 tablet by mouth Daily., Disp: , Rfl:     Omega-3 Fatty Acids (fish oil) 1000 MG capsule capsule, Take 1 capsule by mouth 2 (Two) Times a Day With Meals. (Patient not taking: Reported on 7/25/2024), Disp: , Rfl:           Assessment and plan:  Diabetes mellitus type 2 with hyperglycemia: Overall doing well with A1c at 7.17.  Recommend to continue current regimen.  Recommend to always keep glucose source in case of low blood sugars.      Hypertension: Fair control, continue current medications.    Hyperlipidemia: Currently on atorvastatin, follow lipid panel    CKD stage IV disease: Follows with Dr. Ford.     Assessment and plan from January 5, 2024 reviewed and updated.      Dieudonne Sellers MD FACE.

## 2024-08-06 ENCOUNTER — OFFICE VISIT (OUTPATIENT)
Dept: CARDIOLOGY | Facility: CLINIC | Age: 79
End: 2024-08-06
Payer: MEDICARE

## 2024-08-06 VITALS
WEIGHT: 173 LBS | HEIGHT: 65 IN | DIASTOLIC BLOOD PRESSURE: 69 MMHG | SYSTOLIC BLOOD PRESSURE: 130 MMHG | BODY MASS INDEX: 28.82 KG/M2 | OXYGEN SATURATION: 99 % | HEART RATE: 60 BPM

## 2024-08-06 DIAGNOSIS — E11.9 TYPE 2 DIABETES MELLITUS WITHOUT COMPLICATION, WITHOUT LONG-TERM CURRENT USE OF INSULIN: ICD-10-CM

## 2024-08-06 DIAGNOSIS — I10 ESSENTIAL HYPERTENSION: Primary | ICD-10-CM

## 2024-08-06 DIAGNOSIS — R00.2 PALPITATIONS: ICD-10-CM

## 2024-08-06 DIAGNOSIS — E78.00 PURE HYPERCHOLESTEROLEMIA: ICD-10-CM

## 2024-08-06 PROCEDURE — 3075F SYST BP GE 130 - 139MM HG: CPT | Performed by: INTERNAL MEDICINE

## 2024-08-06 PROCEDURE — 1159F MED LIST DOCD IN RCRD: CPT | Performed by: INTERNAL MEDICINE

## 2024-08-06 PROCEDURE — 1160F RVW MEDS BY RX/DR IN RCRD: CPT | Performed by: INTERNAL MEDICINE

## 2024-08-06 PROCEDURE — 99214 OFFICE O/P EST MOD 30 MIN: CPT | Performed by: INTERNAL MEDICINE

## 2024-08-06 PROCEDURE — 3078F DIAST BP <80 MM HG: CPT | Performed by: INTERNAL MEDICINE

## 2024-08-06 NOTE — PROGRESS NOTES
"    Subjective:     Encounter Date:2024      Patient ID: Neeta Harrell is a 78 y.o. female.    Chief Complaint:  Hypertension  Pertinent negatives include no chest pain, headaches, malaise/fatigue, palpitations or shortness of breath.   78-year-old white female with history of palpitations and arrhythmia history of hypertension diabetes and hyperlipidemia presents to my office for a follow-up.  Patient is currently stable without any symptoms of chest pain or shortness of breath at rest or exertion.  No complaint of any PND or orthopnea.  No palpitations dizziness syncope or swelling of the feet.  Patient is taking all her medicines regularly.  Patient does not smoke.    The following portions of the patient's history were reviewed and updated as appropriate: allergies, current medications, past family history, past medical history, past social history, past surgical history, and problem list.  Past Medical History:   Diagnosis Date    Arthritis     Cataract     CKD (chronic kidney disease)     Diabetes mellitus     Hyperlipidemia     Hypertension      Past Surgical History:   Procedure Laterality Date    ANKLE OPEN REDUCTION INTERNAL FIXATION Left 2022    Procedure: ANKLE OPEN REDUCTION INTERNAL FIXATION;  Surgeon: Tyrell Galarza MD;  Location: [facility];  Service: Orthopedics;  Laterality: Left;    CATARACT EXTRACTION       SECTION      x2    CHOLECYSTECTOMY      HYSTERECTOMY      INCONTINENCE SURGERY      OTHER SURGICAL HISTORY      Fishula in left arm    TONSILLECTOMY       /69   Pulse 60   Ht 165.1 cm (65\")   Wt 78.5 kg (173 lb)   SpO2 99%   BMI 28.79 kg/m²   Family History   Problem Relation Age of Onset    Kidney disease Mother     Thyroid disease Mother     Hypertension Father     Diabetes Father     Thyroid disease Sister     Hyperlipidemia Sister     Hypertension Sister     Heart disease Sister     Diabetes Sister     Arrhythmia Sister         has pacemaker    " Kidney disease Brother     Hypertension Brother     Diabetes Brother     Heart disease Brother         bypass sx    Hyperlipidemia Brother        Current Outpatient Medications:     amLODIPine (NORVASC) 5 MG tablet, TAKE 1 TABLET BY MOUTH DAILY, Disp: 90 tablet, Rfl: 3    aspirin 81 MG EC tablet, Take 1 tablet by mouth Every Other Day., Disp: , Rfl:     atorvastatin (LIPITOR) 40 MG tablet, Take 1 tablet by mouth Daily., Disp: , Rfl:     BD Pen Needle Melissa 2nd Gen 32G X 4 MM misc, 4 (Four) Times a Day., Disp: , Rfl:     carvedilol (Coreg) 6.25 MG tablet, Take 1 tablet by mouth 2 (Two) Times a Day With Meals., Disp: 60 tablet, Rfl: 0    cholecalciferol (VITAMIN D3) 25 MCG (1000 UT) tablet, Take 2 tablets by mouth 2 (Two) Times a Day., Disp: , Rfl:     Continuous Blood Gluc  (FreeStyle Dejah 2 Lincoln) device, 1 each Continuous., Disp: 1 each, Rfl: 0    Continuous Blood Gluc Sensor (FreeStyle Dejah 2 Sensor) misc, 1 each Every 14 (Fourteen) Days., Disp: 6 each, Rfl: 4    Dulaglutide (Trulicity) 3 MG/0.5ML solution pen-injector, Inject 0.5 mL under the skin into the appropriate area as directed 1 (One) Time Per Week., Disp: 6 mL, Rfl: 3    empagliflozin (Jardiance) 10 MG tablet tablet, Take  by mouth Daily., Disp: , Rfl:     ferrous sulfate 324 (65 Fe) MG tablet delayed-release EC tablet, Take 1 tablet by mouth Daily With Breakfast., Disp: , Rfl:     glucagon (GLUCAGEN) 1 MG injection, Inject 1 mg into the appropriate muscle as directed by prescriber 1 (One) Time As Needed., Disp: , Rfl:     Glucagon (Gvoke PFS) 1 MG/0.2ML solution prefilled syringe, INJECT 1 MG UNDER THE SKIN INTO THE APPROPRIATE AREA AS DIRECTED AS NEEDED FOR LOW BLOOD SUGAR, Disp: , Rfl:     Insulin Lispro, 1 Unit Dial, (HUMALOG) 100 UNIT/ML solution pen-injector, Inject 6 units with breakfast and lunch, and 8 units with supper with a sliding scale 1 unit for each 50 mg blood sugar over 150 at meals only., Disp: 15 mL, Rfl: 6    Lantus  SoloStar 100 UNIT/ML injection pen, ADMINISTER 30 UNITS UNDER THE SKIN EVERY DAY, Disp: , Rfl:     losartan (COZAAR) 100 MG tablet, Take 1 tablet by mouth Daily., Disp: , Rfl:     omega-3 acid ethyl esters (LOVAZA) 1 g capsule, Take 1 capsule by mouth Every 12 (Twelve) Hours., Disp: , Rfl:     Omega-3 Fatty Acids (fish oil) 1000 MG capsule capsule, Take 1 capsule by mouth 2 (Two) Times a Day With Meals., Disp: , Rfl:     pantoprazole (PROTONIX) 40 MG EC tablet, Take 1 tablet by mouth 2 (two) times a day., Disp: , Rfl:     sodium bicarbonate 650 MG tablet, Take 1 tablet by mouth 3 (Three) Times a Day., Disp: , Rfl:     Zinc 50 MG capsule, Take 1 tablet by mouth Daily., Disp: , Rfl:     carvedilol (COREG) 12.5 MG tablet, Take 1 tablet by mouth 2 (Two) Times a Day., Disp: , Rfl:     Glucagon (Gvoke HypoPen 2-Pack) 1 MG/0.2ML solution auto-injector, Inject 1 mg under the skin into the appropriate area as directed As Needed (for a low blood sugar)., Disp: 0.2 mL, Rfl: 1  Allergies   Allergen Reactions    Ibuprofen Other (See Comments) and Nausea And Vomiting     Kidney and liver problems     Social History     Socioeconomic History    Marital status:      Spouse name: Tyrell    Number of children: 1    Years of education: 12   Tobacco Use    Smoking status: Never    Smokeless tobacco: Never   Vaping Use    Vaping status: Never Used   Substance and Sexual Activity    Alcohol use: Never    Drug use: Never    Sexual activity: Defer     Review of Systems   Constitutional: Negative for malaise/fatigue.   Cardiovascular:  Negative for chest pain, dyspnea on exertion, leg swelling and palpitations.   Respiratory:  Negative for cough and shortness of breath.    Gastrointestinal:  Negative for abdominal pain, nausea and vomiting.   Neurological:  Negative for dizziness, focal weakness, headaches, light-headedness and numbness.   All other systems reviewed and are negative.             Objective:     Constitutional:        Appearance: Well-developed.   Eyes:      General: No scleral icterus.     Conjunctiva/sclera: Conjunctivae normal.   HENT:      Head: Normocephalic and atraumatic.   Neck:      Vascular: No carotid bruit or JVD.   Pulmonary:      Effort: Pulmonary effort is normal.      Breath sounds: Normal breath sounds. No wheezing. No rales.   Cardiovascular:      Normal rate. Regular rhythm.   Pulses:     Intact distal pulses.   Abdominal:      General: Bowel sounds are normal.      Palpations: Abdomen is soft.   Musculoskeletal:      Cervical back: Normal range of motion and neck supple. Skin:     General: Skin is warm and dry.      Findings: No rash.   Neurological:      Mental Status: Alert.       Procedures    Lab Review:         MDM    #1 palpitation  Patient has history of palpitations with atrial arrhythmia and is currently stable on carvedilol  2.  Hypertension  .  Blood pressure is currently stable on carvedilol and losartan  3.  Hyperlipidemia  Patient is currently on atorvastatin and lipid levels are well within normal limits  4.  Diabetes  Pain is on insulin and followed by the primary care doctor.    Patient's previous medical records, labs, and EKG were reviewed and discussed with the patient at today's visit.

## 2025-02-04 ENCOUNTER — LAB (OUTPATIENT)
Dept: ENDOCRINOLOGY | Facility: CLINIC | Age: 80
End: 2025-02-04
Payer: MEDICARE

## 2025-02-04 DIAGNOSIS — Z79.4 TYPE 2 DIABETES MELLITUS WITH HYPERGLYCEMIA, WITH LONG-TERM CURRENT USE OF INSULIN: ICD-10-CM

## 2025-02-04 DIAGNOSIS — E11.65 TYPE 2 DIABETES MELLITUS WITH HYPERGLYCEMIA, WITH LONG-TERM CURRENT USE OF INSULIN: ICD-10-CM

## 2025-02-05 LAB
ALBUMIN SERPL-MCNC: 4 G/DL (ref 3.8–4.8)
ALBUMIN/CREAT UR: 90 MG/G CREAT (ref 0–29)
ALP SERPL-CCNC: 36 IU/L (ref 44–121)
ALT SERPL-CCNC: 14 IU/L (ref 0–32)
AST SERPL-CCNC: 18 IU/L (ref 0–40)
BILIRUB SERPL-MCNC: 0.4 MG/DL (ref 0–1.2)
BUN SERPL-MCNC: 28 MG/DL (ref 8–27)
BUN/CREAT SERPL: 15 (ref 12–28)
CALCIUM SERPL-MCNC: 9.5 MG/DL (ref 8.7–10.3)
CHLORIDE SERPL-SCNC: 104 MMOL/L (ref 96–106)
CHOLEST SERPL-MCNC: 165 MG/DL (ref 100–199)
CO2 SERPL-SCNC: 23 MMOL/L (ref 20–29)
CREAT SERPL-MCNC: 1.84 MG/DL (ref 0.57–1)
CREAT UR-MCNC: 71 MG/DL
EGFRCR SERPLBLD CKD-EPI 2021: 28 ML/MIN/1.73
GLOBULIN SER CALC-MCNC: 2.6 G/DL (ref 1.5–4.5)
GLUCOSE SERPL-MCNC: 150 MG/DL (ref 70–99)
HBA1C MFR BLD: 7.2 % (ref 4.8–5.6)
HDLC SERPL-MCNC: 60 MG/DL
LDLC SERPL CALC-MCNC: 81 MG/DL (ref 0–99)
MICROALBUMIN UR-MCNC: 64 UG/ML
POTASSIUM SERPL-SCNC: 4.3 MMOL/L (ref 3.5–5.2)
PROT SERPL-MCNC: 6.6 G/DL (ref 6–8.5)
SODIUM SERPL-SCNC: 140 MMOL/L (ref 134–144)
TRIGL SERPL-MCNC: 137 MG/DL (ref 0–149)
VLDLC SERPL CALC-MCNC: 24 MG/DL (ref 5–40)

## 2025-02-11 ENCOUNTER — OFFICE VISIT (OUTPATIENT)
Dept: ENDOCRINOLOGY | Facility: CLINIC | Age: 80
End: 2025-02-11
Payer: MEDICARE

## 2025-02-11 ENCOUNTER — OFFICE VISIT (OUTPATIENT)
Dept: CARDIOLOGY | Facility: CLINIC | Age: 80
End: 2025-02-11
Payer: MEDICARE

## 2025-02-11 VITALS
HEART RATE: 55 BPM | WEIGHT: 174 LBS | BODY MASS INDEX: 28.99 KG/M2 | OXYGEN SATURATION: 99 % | DIASTOLIC BLOOD PRESSURE: 74 MMHG | HEIGHT: 65 IN | SYSTOLIC BLOOD PRESSURE: 135 MMHG

## 2025-02-11 VITALS
SYSTOLIC BLOOD PRESSURE: 132 MMHG | HEIGHT: 65 IN | DIASTOLIC BLOOD PRESSURE: 68 MMHG | HEART RATE: 51 BPM | OXYGEN SATURATION: 99 % | WEIGHT: 173 LBS | BODY MASS INDEX: 28.82 KG/M2

## 2025-02-11 DIAGNOSIS — E11.9 TYPE 2 DIABETES MELLITUS WITHOUT COMPLICATION, WITHOUT LONG-TERM CURRENT USE OF INSULIN: ICD-10-CM

## 2025-02-11 DIAGNOSIS — I10 BENIGN ESSENTIAL HYPERTENSION: ICD-10-CM

## 2025-02-11 DIAGNOSIS — E78.2 MIXED HYPERLIPIDEMIA: ICD-10-CM

## 2025-02-11 DIAGNOSIS — E78.00 PURE HYPERCHOLESTEROLEMIA: ICD-10-CM

## 2025-02-11 DIAGNOSIS — I10 ESSENTIAL HYPERTENSION: Primary | ICD-10-CM

## 2025-02-11 DIAGNOSIS — R00.2 PALPITATIONS: ICD-10-CM

## 2025-02-11 DIAGNOSIS — N18.4 STAGE 4 CHRONIC KIDNEY DISEASE: Chronic | ICD-10-CM

## 2025-02-11 DIAGNOSIS — Z79.4 TYPE 2 DIABETES MELLITUS WITH HYPERGLYCEMIA, WITH LONG-TERM CURRENT USE OF INSULIN: Primary | ICD-10-CM

## 2025-02-11 DIAGNOSIS — E11.65 TYPE 2 DIABETES MELLITUS WITH HYPERGLYCEMIA, WITH LONG-TERM CURRENT USE OF INSULIN: Primary | ICD-10-CM

## 2025-02-11 PROCEDURE — 99214 OFFICE O/P EST MOD 30 MIN: CPT | Performed by: INTERNAL MEDICINE

## 2025-02-11 PROCEDURE — 3078F DIAST BP <80 MM HG: CPT | Performed by: INTERNAL MEDICINE

## 2025-02-11 PROCEDURE — 1160F RVW MEDS BY RX/DR IN RCRD: CPT | Performed by: INTERNAL MEDICINE

## 2025-02-11 PROCEDURE — 1159F MED LIST DOCD IN RCRD: CPT | Performed by: INTERNAL MEDICINE

## 2025-02-11 PROCEDURE — 95251 CONT GLUC MNTR ANALYSIS I&R: CPT | Performed by: INTERNAL MEDICINE

## 2025-02-11 PROCEDURE — 3075F SYST BP GE 130 - 139MM HG: CPT | Performed by: INTERNAL MEDICINE

## 2025-02-11 RX ORDER — FLUOROURACIL 50 MG/G
CREAM TOPICAL
COMMUNITY

## 2025-02-11 RX ORDER — GLUCAGON INJECTION, SOLUTION 1 MG/.2ML
1 INJECTION, SOLUTION SUBCUTANEOUS AS NEEDED
Qty: 0.2 ML | Refills: 1 | Status: SHIPPED | OUTPATIENT
Start: 2025-02-11

## 2025-02-11 RX ORDER — OXYCODONE AND ACETAMINOPHEN 7.5; 325 MG/1; MG/1
1 TABLET ORAL 2 TIMES DAILY
COMMUNITY

## 2025-02-11 RX ORDER — CARVEDILOL 12.5 MG/1
6.25 TABLET ORAL
COMMUNITY
End: 2025-02-11

## 2025-02-11 NOTE — PROGRESS NOTES
"    Subjective:     Encounter Date:2025      Patient ID: Neeta Harrell is a 79 y.o. female.    Chief Complaint:  History of Present Illness 79-year-old white female with history of palpitations hypertension hyperlipidemia and diabetes presents to my office for a follow-up.  Patient is currently stable without any symptoms of chest pain or shortness of breath at rest or exertion.  No complaint of any PND or orthopnea.  No palpitations dizziness syncope or swelling of the feet.  Patient is taking all the medicines regularly patient does not smoke.    The following portions of the patient's history were reviewed and updated as appropriate: allergies, current medications, past family history, past medical history, past social history, past surgical history, and problem list.  Past Medical History:   Diagnosis Date    Arthritis     Cataract     CKD (chronic kidney disease)     Diabetes mellitus     Hyperlipidemia     Hypertension      Past Surgical History:   Procedure Laterality Date    ANKLE OPEN REDUCTION INTERNAL FIXATION Left 2022    Procedure: ANKLE OPEN REDUCTION INTERNAL FIXATION;  Surgeon: Tyrell Galarza MD;  Location: [facility];  Service: Orthopedics;  Laterality: Left;    CATARACT EXTRACTION       SECTION      x2    CHOLECYSTECTOMY      HYSTERECTOMY      INCONTINENCE SURGERY      OTHER SURGICAL HISTORY      Fishula in left arm    TONSILLECTOMY       /74   Pulse 55   Ht 165.1 cm (65\")   Wt 78.9 kg (174 lb)   SpO2 99%   BMI 28.96 kg/m²   Family History   Problem Relation Age of Onset    Kidney disease Mother     Thyroid disease Mother     Hypertension Father     Diabetes Father     Thyroid disease Sister     Hyperlipidemia Sister     Hypertension Sister     Heart disease Sister     Diabetes Sister     Arrhythmia Sister         has pacemaker    Kidney disease Brother     Hypertension Brother     Diabetes Brother     Heart disease Brother         bypass sx    " Hyperlipidemia Brother        Current Outpatient Medications:     amLODIPine (NORVASC) 5 MG tablet, TAKE 1 TABLET BY MOUTH DAILY, Disp: 90 tablet, Rfl: 3    aspirin 81 MG EC tablet, Take 1 tablet by mouth Every Other Day., Disp: , Rfl:     atorvastatin (LIPITOR) 40 MG tablet, Take 1 tablet by mouth Daily., Disp: , Rfl:     BD Pen Needle Melissa 2nd Gen 32G X 4 MM misc, 4 (Four) Times a Day., Disp: , Rfl:     carvedilol (Coreg) 6.25 MG tablet, Take 1 tablet by mouth 2 (Two) Times a Day With Meals., Disp: 60 tablet, Rfl: 0    cholecalciferol (VITAMIN D3) 25 MCG (1000 UT) tablet, Take 2 tablets by mouth 2 (Two) Times a Day., Disp: , Rfl:     Continuous Blood Gluc  (FreeStyle Dejah 2 Lincoln) device, 1 each Continuous., Disp: 1 each, Rfl: 0    Continuous Blood Gluc Sensor (FreeStyle Dejah 2 Sensor) misc, 1 each Every 14 (Fourteen) Days., Disp: 6 each, Rfl: 4    Dulaglutide (Trulicity) 3 MG/0.5ML solution pen-injector, Inject 0.5 mL under the skin into the appropriate area as directed 1 (One) Time Per Week., Disp: 6 mL, Rfl: 3    empagliflozin (Jardiance) 10 MG tablet tablet, Take  by mouth Daily., Disp: , Rfl:     ferrous sulfate 324 (65 Fe) MG tablet delayed-release EC tablet, Take 1 tablet by mouth Daily With Breakfast., Disp: , Rfl:     fluorouracil (EFUDEX) 5 % cream, APPLY TO UPPER RIGHT BACK AND BILATERAL EARS IN THE MORNING, Disp: , Rfl:     glucagon (GLUCAGEN) 1 MG injection, Inject 1 mg into the appropriate muscle as directed by prescriber 1 (One) Time As Needed., Disp: , Rfl:     Glucagon (Gvoke PFS) 1 MG/0.2ML solution prefilled syringe, Inject 1 each under the skin into the appropriate area as directed As Needed (Hypoglycemia)., Disp: 0.2 mL, Rfl: 1    Insulin Lispro, 1 Unit Dial, (HUMALOG) 100 UNIT/ML solution pen-injector, Inject 6 units with breakfast and lunch, and 8 units with supper with a sliding scale 1 unit for each 50 mg blood sugar over 150 at meals only., Disp: 15 mL, Rfl: 6    Lantus  SoloStar 100 UNIT/ML injection pen, ADMINISTER 30 UNITS UNDER THE SKIN EVERY DAY, Disp: , Rfl:     losartan (COZAAR) 100 MG tablet, Take 1 tablet by mouth Daily., Disp: , Rfl:     omega-3 acid ethyl esters (LOVAZA) 1 g capsule, Take 1 capsule by mouth Every 12 (Twelve) Hours., Disp: , Rfl:     oxyCODONE-acetaminophen (PERCOCET) 7.5-325 MG per tablet, Take 1 tablet by mouth 2 (Two) Times a Day., Disp: , Rfl:     pantoprazole (PROTONIX) 40 MG EC tablet, Take 1 tablet by mouth 2 (two) times a day., Disp: , Rfl:     sodium bicarbonate 650 MG tablet, Take 1 tablet by mouth 3 (Three) Times a Day., Disp: , Rfl:     Zinc 50 MG capsule, Take 1 tablet by mouth Daily., Disp: , Rfl:   Allergies   Allergen Reactions    Ibuprofen Other (See Comments) and Nausea And Vomiting     Kidney and liver problems     Social History     Socioeconomic History    Marital status:      Spouse name: Tyrell    Number of children: 1    Years of education: 12   Tobacco Use    Smoking status: Never    Smokeless tobacco: Never   Vaping Use    Vaping status: Never Used   Substance and Sexual Activity    Alcohol use: Never    Drug use: Never    Sexual activity: Defer     Review of Systems   Constitutional: Negative for malaise/fatigue.   Cardiovascular:  Negative for chest pain, dyspnea on exertion, leg swelling and palpitations.   Respiratory:  Negative for cough and shortness of breath.    Gastrointestinal:  Negative for abdominal pain, nausea and vomiting.   Neurological:  Negative for dizziness, focal weakness, headaches, light-headedness and numbness.   All other systems reviewed and are negative.             Objective:     Constitutional:       Appearance: Well-developed.   Eyes:      General: No scleral icterus.     Conjunctiva/sclera: Conjunctivae normal.   HENT:      Head: Normocephalic and atraumatic.   Neck:      Vascular: No carotid bruit or JVD.   Pulmonary:      Effort: Pulmonary effort is normal.      Breath sounds: Normal breath  sounds. No wheezing. No rales.   Cardiovascular:      Normal rate. Regular rhythm.   Pulses:     Intact distal pulses.   Abdominal:      General: Bowel sounds are normal.      Palpations: Abdomen is soft.   Musculoskeletal:      Cervical back: Normal range of motion and neck supple. Skin:     General: Skin is warm and dry.      Findings: No rash.   Neurological:      Mental Status: Alert.       Procedures    Lab Review:         MDM    #1 palpitation  Patient had paroxysmal atrial arrhythmias which are of short duration and hence she was placed on beta-blockers and she is doing very well and tolerating the medicine and is not having any symptoms and hence we will continue current medical therapy  2.  Hypertension  Patient blood pressure currently stable on losartan and carvedilol.  And also on amlodipine  3.  Diabetes  Patient is on insulin and followed by the primary care doctor.  4.  Hyperlipidemia  Patient is on statins and the lipid levels are well within normal limits    Patient's previous medical records, labs, and EKG were reviewed and discussed with the patient at today's visit.

## 2025-02-11 NOTE — PROGRESS NOTES
Endocrine Progress Note Outpatient     Patient Care Team:  Landon Smith MD as PCP - General  Landon Smith MD as PCP - Family Medicine  Cesar Ellis MD as Consulting Physician (Cardiology)    Chief Complaint: Follow-up type 2 diabetes    HPI: This is a 79-year-old female with history of type 2 diabetes, hypertension, hyperlipidemia and CKD stage IV disease is here for follow-up.    For type 2 diabetes: Initial diagnosis in June 2020.  She did have diabetes education in the past.  She is currently on Lantus 18 units daily in the evening along with Humalog 6 units with each meal as well as Trulicity 3.0  mg subcu weekly.  Also on Jardiance 10 mg once a day.  She is currently using freestyle jareth sensor system.  She denies any significant issues with low blood sugars.    Hyperlipidemia: Currently on atorvastatin    Hypertension: Well-controlled.    CKD stage IV disease: Follows with Dr. Ford.  She did talk to him about GLP-1 agonist therapy and he is okay to use those.       Past Medical History:   Diagnosis Date    Arthritis     Cataract     CKD (chronic kidney disease)     Diabetes mellitus     Hyperlipidemia     Hypertension        Social History     Socioeconomic History    Marital status:      Spouse name: Tyrell    Number of children: 1    Years of education: 12   Tobacco Use    Smoking status: Never    Smokeless tobacco: Never   Vaping Use    Vaping status: Never Used   Substance and Sexual Activity    Alcohol use: Never    Drug use: Never    Sexual activity: Defer       Family History   Problem Relation Age of Onset    Kidney disease Mother     Thyroid disease Mother     Hypertension Father     Diabetes Father     Thyroid disease Sister     Hyperlipidemia Sister     Hypertension Sister     Heart disease Sister     Diabetes Sister     Arrhythmia Sister         has pacemaker    Kidney disease Brother     Hypertension Brother     Diabetes Brother     Heart disease Brother         bypass sx     Hyperlipidemia Brother        Allergies   Allergen Reactions    Ibuprofen Other (See Comments) and Nausea And Vomiting     Kidney and liver problems       ROS:   Constitutional:  Denies fatigue, tiredness.    Eyes:  Denies change in visual acuity   HENT:  Denies nasal congestion or sore throat   Respiratory: denies cough, shortness of breath.   Cardiovascular:  denies chest pain, edema   GI:  Denies abdominal pain, nausea, vomiting.   Musculoskeletal:  Denies back pain or joint pain   Integument:  Denies dry skin and rash   Neurologic:  Denies headache, focal weakness or sensory changes   Endocrine:  Denies polyuria or polydipsia   Psychiatric:  Denies depression or anxiety      Vitals:    02/11/25 1028   BP: 132/68   Pulse: 51   SpO2: 99%       Body mass index is 28.79 kg/m².     Physical Exam:  GEN: NAD, conversant  EYES: EOMI, PERRL,  NECK: no thyromegaly,   CV: RRR  LUNG: CTA  PSYCH: Awake and coherent      Results Review:     I reviewed the patient's new clinical results.    Lab Results   Component Value Date    HGBA1C 7.2 (H) 02/04/2025    HGBA1C 7.17 (H) 07/18/2024    HGBA1C 7.3 01/12/2024      Lab Results   Component Value Date    GLUCOSE 150 (H) 02/04/2025    BUN 28 (H) 02/04/2025    CREATININE 1.84 (H) 02/04/2025    EGFRIFNONA 27 (L) 05/28/2021    BCR 15 02/04/2025    K 4.3 02/04/2025    CO2 23 02/04/2025    CALCIUM 9.5 02/04/2025    PROTENTOTREF 6.6 02/04/2025    ALBUMIN 4.0 02/04/2025    AST 18 02/04/2025    ALT 14 02/04/2025    CHOL 151 07/18/2024    TRIG 137 02/04/2025    LDL 81 02/04/2025    HDL 60 02/04/2025     Restart jareth download interpretation: Dates covered was between January 29, 2025 to February 11, 2025.  Average blood sugar was 151.  She is spending 78% in the range and 22% above range and 0% below range.  Glucose graph does show some mild postprandial hyperglycemia.  GMI was 6.9%.      Medication Review: Reviewed.       Current Outpatient Medications:     amLODIPine (NORVASC) 5 MG tablet,  TAKE 1 TABLET BY MOUTH DAILY, Disp: 90 tablet, Rfl: 3    aspirin 81 MG EC tablet, Take 1 tablet by mouth Every Other Day., Disp: , Rfl:     atorvastatin (LIPITOR) 40 MG tablet, Take 1 tablet by mouth Daily., Disp: , Rfl:     BD Pen Needle Melissa 2nd Gen 32G X 4 MM misc, 4 (Four) Times a Day., Disp: , Rfl:     carvedilol (Coreg) 6.25 MG tablet, Take 1 tablet by mouth 2 (Two) Times a Day With Meals., Disp: 60 tablet, Rfl: 0    cholecalciferol (VITAMIN D3) 25 MCG (1000 UT) tablet, Take 2 tablets by mouth 2 (Two) Times a Day., Disp: , Rfl:     Continuous Blood Gluc  (FreeStyle Dejah 2 Cass) device, 1 each Continuous., Disp: 1 each, Rfl: 0    Continuous Blood Gluc Sensor (FreeStyle Dejah 2 Sensor) misc, 1 each Every 14 (Fourteen) Days., Disp: 6 each, Rfl: 4    Dulaglutide (Trulicity) 3 MG/0.5ML solution pen-injector, Inject 0.5 mL under the skin into the appropriate area as directed 1 (One) Time Per Week., Disp: 6 mL, Rfl: 3    empagliflozin (Jardiance) 10 MG tablet tablet, Take  by mouth Daily., Disp: , Rfl:     ferrous sulfate 324 (65 Fe) MG tablet delayed-release EC tablet, Take 1 tablet by mouth Daily With Breakfast., Disp: , Rfl:     fluorouracil (EFUDEX) 5 % cream, APPLY TO UPPER RIGHT BACK AND BILATERAL EARS IN THE MORNING, Disp: , Rfl:     glucagon (GLUCAGEN) 1 MG injection, Inject 1 mg into the appropriate muscle as directed by prescriber 1 (One) Time As Needed., Disp: , Rfl:     Glucagon (Gvoke PFS) 1 MG/0.2ML solution prefilled syringe, INJECT 1 MG UNDER THE SKIN INTO THE APPROPRIATE AREA AS DIRECTED AS NEEDED FOR LOW BLOOD SUGAR, Disp: , Rfl:     Insulin Lispro, 1 Unit Dial, (HUMALOG) 100 UNIT/ML solution pen-injector, Inject 6 units with breakfast and lunch, and 8 units with supper with a sliding scale 1 unit for each 50 mg blood sugar over 150 at meals only., Disp: 15 mL, Rfl: 6    Lantus SoloStar 100 UNIT/ML injection pen, ADMINISTER 30 UNITS UNDER THE SKIN EVERY DAY, Disp: , Rfl:     losartan  (COZAAR) 100 MG tablet, Take 1 tablet by mouth Daily., Disp: , Rfl:     omega-3 acid ethyl esters (LOVAZA) 1 g capsule, Take 1 capsule by mouth Every 12 (Twelve) Hours., Disp: , Rfl:     oxyCODONE-acetaminophen (PERCOCET) 7.5-325 MG per tablet, Take 1 tablet by mouth 2 (Two) Times a Day., Disp: , Rfl:     pantoprazole (PROTONIX) 40 MG EC tablet, Take 1 tablet by mouth 2 (two) times a day., Disp: , Rfl:     sodium bicarbonate 650 MG tablet, Take 1 tablet by mouth 3 (Three) Times a Day., Disp: , Rfl:     Zinc 50 MG capsule, Take 1 tablet by mouth Daily., Disp: , Rfl:           Assessment and plan:  Diabetes mellitus type 2 with hyperglycemia: Overall doing well with A1c at 7.2.  Recommend to continue current regimen.  Recommend to always keep glucose source in case of low blood sugars.      Hypertension: Fair control, continue current medications.    Hyperlipidemia: Currently on atorvastatin, follow lipid panel    CKD stage IV disease: Follows with Dr. Ford.     Assessment and plan from July 25, 2024 reviewed and updated.      Dieudonne Sellers MD FACE.

## 2025-02-25 ENCOUNTER — TELEPHONE (OUTPATIENT)
Dept: ENDOCRINOLOGY | Facility: CLINIC | Age: 80
End: 2025-02-25
Payer: MEDICARE

## 2025-05-21 DIAGNOSIS — E11.65 TYPE 2 DIABETES MELLITUS WITH HYPERGLYCEMIA, WITH LONG-TERM CURRENT USE OF INSULIN: ICD-10-CM

## 2025-05-21 DIAGNOSIS — Z79.4 TYPE 2 DIABETES MELLITUS WITH HYPERGLYCEMIA, WITH LONG-TERM CURRENT USE OF INSULIN: ICD-10-CM

## 2025-05-21 NOTE — TELEPHONE ENCOUNTER
"Caller: Neeta Harrell \"JENNIFER\"    Relationship: Self    Best call back number: [phone]     Requested Prescriptions:   Requested Prescriptions     Pending Prescriptions Disp Refills    Continuous Glucose  (FreeStyle Dejah 2 Perkins) device 1 each 0     Sig: Use 1 each Continuous.        Pharmacy where request should be sent:    [facility]    Last office visit with prescribing clinician: 2/11/2025     Next office visit with prescribing clinician: 12/5/2025     Additional details provided by patient: PLEASE UPDATE TO DEJAH 2 PLUS    Does the patient have less than a 3 day supply:  [] Yes  [x] No    Would you like a call back once the refill request has been completed: [x] Yes [] No    If the office needs to give you a call back, can they leave a voicemail: [x] Yes [] No    Wellington Kwong Rep   05/21/25 13:53 EDT         "

## 2025-06-16 RX ORDER — DULAGLUTIDE 3 MG/.5ML
INJECTION, SOLUTION SUBCUTANEOUS
Qty: 6 ML | Refills: 1 | Status: SHIPPED | OUTPATIENT
Start: 2025-06-16

## 2025-08-12 ENCOUNTER — OFFICE VISIT (OUTPATIENT)
Dept: CARDIOLOGY | Facility: CLINIC | Age: 80
End: 2025-08-12
Payer: MEDICARE

## 2025-08-12 VITALS
OXYGEN SATURATION: 100 % | HEART RATE: 61 BPM | DIASTOLIC BLOOD PRESSURE: 74 MMHG | HEIGHT: 65 IN | WEIGHT: 165 LBS | BODY MASS INDEX: 27.49 KG/M2 | SYSTOLIC BLOOD PRESSURE: 131 MMHG

## 2025-08-12 DIAGNOSIS — E11.9 TYPE 2 DIABETES MELLITUS WITHOUT COMPLICATION, WITHOUT LONG-TERM CURRENT USE OF INSULIN: ICD-10-CM

## 2025-08-12 DIAGNOSIS — R00.2 PALPITATIONS: ICD-10-CM

## 2025-08-12 DIAGNOSIS — E78.00 PURE HYPERCHOLESTEROLEMIA: ICD-10-CM

## 2025-08-12 DIAGNOSIS — I10 ESSENTIAL HYPERTENSION: Primary | ICD-10-CM

## (undated) DEVICE — DRILL BIT

## (undated) DEVICE — SPLNT ORTHOGLASS 4INX15FT

## (undated) DEVICE — TOWEL,OR,DSP,ST,WHITE,DLX,4/PK,20PK/CS: Brand: MEDLINE

## (undated) DEVICE — PAD UNDERCAST WYTEX 4IN 4YD LF STRL

## (undated) DEVICE — APPL CHLORAPREP HI/LITE TINTED 10.5ML ORNG

## (undated) DEVICE — SPNG GZ AVANT 6PLY 4X4IN STRL PK/2

## (undated) DEVICE — SUT VIC 2/0 CT2 27IN J269H

## (undated) DEVICE — K-WIRE W/ LONG SMOOTH TIP
Type: IMPLANTABLE DEVICE | Site: ANKLE | Status: NON-FUNCTIONAL
Removed: 2022-09-03

## (undated) DEVICE — TBG PENCL TELESCP MEGADYNE SMOKE EVAC 15FT

## (undated) DEVICE — STAPLER, SKIN, 35W, A: Brand: MEDLINE INDUSTRIES, INC.

## (undated) DEVICE — KT SURG TURNOVER 050

## (undated) DEVICE — UNDRGLV SURG BIOGEL PIMICROINDICATOR SYNTH SZ8 LF STRL

## (undated) DEVICE — GLV SURG SENSICARE PI ORTHO SZ8 LF STRL

## (undated) DEVICE — OCCLUSIVE GAUZE STRIP OVERWRAP,3% BISMUTH TRIBROMOPHENATE IN PETROLATUM BLEND: Brand: XEROFORM

## (undated) DEVICE — SOLUTION,WATER,IRRIGATION,1000ML,STERILE: Brand: MEDLINE

## (undated) DEVICE — SPLNT 1 STEP 4INX30IN

## (undated) DEVICE — SOL IRRIG NACL 1000ML

## (undated) DEVICE — C-ARM: Brand: UNBRANDED

## (undated) DEVICE — CUFF TOURNI 1BLADDER 1PRT 30IN STRL

## (undated) DEVICE — UNDERGLV SURG BIOGEL INDICAT PI SZ8 BLU

## (undated) DEVICE — PAD UNDERCAST WYTEX 6IN 4YD LF STRL

## (undated) DEVICE — SYR BLUNT/NDL 10ML 18G 1 1/2IN

## (undated) DEVICE — PK EXTREM 50

## (undated) DEVICE — BNDG ELAS MATRX V/CLS 4IN 5YD LF